# Patient Record
Sex: MALE | Race: WHITE | NOT HISPANIC OR LATINO | Employment: OTHER | URBAN - METROPOLITAN AREA
[De-identification: names, ages, dates, MRNs, and addresses within clinical notes are randomized per-mention and may not be internally consistent; named-entity substitution may affect disease eponyms.]

---

## 2017-01-23 ENCOUNTER — ALLSCRIPTS OFFICE VISIT (OUTPATIENT)
Dept: OTHER | Facility: OTHER | Age: 67
End: 2017-01-23

## 2017-02-07 ENCOUNTER — ALLSCRIPTS OFFICE VISIT (OUTPATIENT)
Dept: OTHER | Facility: OTHER | Age: 67
End: 2017-02-07

## 2017-02-08 ENCOUNTER — GENERIC CONVERSION - ENCOUNTER (OUTPATIENT)
Dept: OTHER | Facility: OTHER | Age: 67
End: 2017-02-08

## 2017-02-09 ENCOUNTER — GENERIC CONVERSION - ENCOUNTER (OUTPATIENT)
Dept: OTHER | Facility: OTHER | Age: 67
End: 2017-02-09

## 2017-02-09 LAB
A/G RATIO (HISTORICAL): 2.2 (ref 1.1–2.5)
ALBUMIN SERPL BCP-MCNC: 4.4 G/DL (ref 3.6–4.8)
ALP SERPL-CCNC: 72 IU/L (ref 39–117)
ALT SERPL W P-5'-P-CCNC: 13 IU/L (ref 0–44)
AST SERPL W P-5'-P-CCNC: 14 IU/L (ref 0–40)
BILIRUB SERPL-MCNC: 1.6 MG/DL (ref 0–1.2)
BUN SERPL-MCNC: 15 MG/DL (ref 8–27)
BUN/CREA RATIO (HISTORICAL): 18 (ref 10–22)
CALCIUM SERPL-MCNC: 9.1 MG/DL (ref 8.6–10.2)
CHLORIDE SERPL-SCNC: 97 MMOL/L (ref 96–106)
CHOLEST SERPL-MCNC: 140 MG/DL (ref 100–199)
CO2 SERPL-SCNC: 25 MMOL/L (ref 18–29)
CREAT SERPL-MCNC: 0.83 MG/DL (ref 0.76–1.27)
EGFR AFRICAN AMERICAN (HISTORICAL): 107 ML/MIN/1.73
EGFR-AMERICAN CALC (HISTORICAL): 92 ML/MIN/1.73
GLUCOSE SERPL-MCNC: 100 MG/DL (ref 65–99)
HDLC SERPL-MCNC: 25 MG/DL
LDLC SERPL CALC-MCNC: 97 MG/DL (ref 0–99)
POTASSIUM SERPL-SCNC: 4 MMOL/L (ref 3.5–5.2)
PROSTATE SPECIFIC ANTIGEN (HISTORICAL): 4.2 NG/ML (ref 0–4)
SODIUM SERPL-SCNC: 137 MMOL/L (ref 134–144)
TOT. GLOBULIN, SERUM (HISTORICAL): 2 G/DL (ref 1.5–4.5)
TOTAL PROTEIN (HISTORICAL): 6.4 G/DL (ref 6–8.5)
TRIGL SERPL-MCNC: 91 MG/DL (ref 0–149)

## 2017-02-10 LAB
CULTURE RESULT (HISTORICAL): NORMAL
INTERPRETATION (HISTORICAL): NORMAL
MISCELLANEOUS LAB TEST RESULT (HISTORICAL): NORMAL

## 2017-02-13 ENCOUNTER — GENERIC CONVERSION - ENCOUNTER (OUTPATIENT)
Dept: OTHER | Facility: OTHER | Age: 67
End: 2017-02-13

## 2017-02-22 ENCOUNTER — ALLSCRIPTS OFFICE VISIT (OUTPATIENT)
Dept: OTHER | Facility: OTHER | Age: 67
End: 2017-02-22

## 2017-02-23 DIAGNOSIS — Z13.6 ENCOUNTER FOR SCREENING FOR CARDIOVASCULAR DISORDERS: ICD-10-CM

## 2017-02-23 DIAGNOSIS — Z13.89 ENCOUNTER FOR SCREENING FOR OTHER DISORDER: ICD-10-CM

## 2017-02-23 DIAGNOSIS — Z13.83 ENCOUNTER FOR SCREENING FOR RESPIRATORY DISORDER: ICD-10-CM

## 2017-03-07 ENCOUNTER — TRANSCRIBE ORDERS (OUTPATIENT)
Dept: ADMINISTRATIVE | Facility: HOSPITAL | Age: 67
End: 2017-03-07

## 2017-03-07 ENCOUNTER — ALLSCRIPTS OFFICE VISIT (OUTPATIENT)
Dept: OTHER | Facility: OTHER | Age: 67
End: 2017-03-07

## 2017-03-07 DIAGNOSIS — N23 RENAL COLIC: Primary | ICD-10-CM

## 2017-03-08 ENCOUNTER — HOSPITAL ENCOUNTER (OUTPATIENT)
Dept: CT IMAGING | Facility: HOSPITAL | Age: 67
Discharge: HOME | End: 2017-03-08
Payer: MEDICARE

## 2017-03-08 DIAGNOSIS — N23 RENAL COLIC: ICD-10-CM

## 2017-03-08 PROCEDURE — 74176 CT ABD & PELVIS W/O CONTRAST: CPT

## 2017-03-13 ENCOUNTER — GENERIC CONVERSION - ENCOUNTER (OUTPATIENT)
Dept: OTHER | Facility: OTHER | Age: 67
End: 2017-03-13

## 2017-04-28 ENCOUNTER — LAB REQUISITION (OUTPATIENT)
Dept: LAB | Facility: HOSPITAL | Age: 67
End: 2017-04-28
Payer: MEDICARE

## 2017-04-28 ENCOUNTER — ALLSCRIPTS OFFICE VISIT (OUTPATIENT)
Dept: OTHER | Facility: OTHER | Age: 67
End: 2017-04-28

## 2017-04-28 DIAGNOSIS — N40.0 ENLARGED PROSTATE WITHOUT LOWER URINARY TRACT SYMPTOMS (LUTS): ICD-10-CM

## 2017-04-28 DIAGNOSIS — N23 RENAL COLIC: ICD-10-CM

## 2017-04-28 LAB
CLARITY UR: NORMAL
COLOR UR: YELLOW
GLUCOSE (HISTORICAL): NORMAL
HGB UR QL STRIP.AUTO: NORMAL
KETONES UR STRIP-MCNC: NORMAL MG/DL
LEUKOCYTE ESTERASE UR QL STRIP: NORMAL
NITRITE UR QL STRIP: NORMAL
PH UR STRIP.AUTO: 7 [PH]
PROT UR STRIP-MCNC: NORMAL MG/DL
SP GR UR STRIP.AUTO: 1.01

## 2017-04-28 PROCEDURE — 87086 URINE CULTURE/COLONY COUNT: CPT | Performed by: UROLOGY

## 2017-04-29 LAB — BACTERIA UR CULT: NORMAL

## 2017-05-25 ENCOUNTER — GENERIC CONVERSION - ENCOUNTER (OUTPATIENT)
Dept: OTHER | Facility: OTHER | Age: 67
End: 2017-05-25

## 2017-05-26 LAB — PROSTATE SPECIFIC ANTIGEN (HISTORICAL): 4.7 NG/ML (ref 0–4)

## 2017-06-01 DIAGNOSIS — R97.20 ELEVATED PROSTATE SPECIFIC ANTIGEN (PSA): ICD-10-CM

## 2017-06-06 ENCOUNTER — GENERIC CONVERSION - ENCOUNTER (OUTPATIENT)
Dept: OTHER | Facility: OTHER | Age: 67
End: 2017-06-06

## 2017-06-07 LAB — PROSTATE SPECIFIC ANTIGEN (HISTORICAL): 3.1 NG/ML (ref 0–4)

## 2017-06-08 ENCOUNTER — ALLSCRIPTS OFFICE VISIT (OUTPATIENT)
Dept: OTHER | Facility: OTHER | Age: 67
End: 2017-06-08

## 2017-06-08 LAB
BILIRUB UR QL STRIP: NORMAL
CLARITY UR: NORMAL
COLOR UR: YELLOW
GLUCOSE (HISTORICAL): NORMAL
HGB UR QL STRIP.AUTO: NORMAL
KETONES UR STRIP-MCNC: NORMAL MG/DL
LEUKOCYTE ESTERASE UR QL STRIP: NORMAL
NITRITE UR QL STRIP: NORMAL
PH UR STRIP.AUTO: 5 [PH]
PROT UR STRIP-MCNC: NORMAL MG/DL
SP GR UR STRIP.AUTO: 1.01

## 2017-06-19 ENCOUNTER — LAB REQUISITION (OUTPATIENT)
Dept: LAB | Facility: HOSPITAL | Age: 67
End: 2017-06-19
Payer: MEDICARE

## 2017-06-19 ENCOUNTER — ALLSCRIPTS OFFICE VISIT (OUTPATIENT)
Dept: OTHER | Facility: OTHER | Age: 67
End: 2017-06-19

## 2017-06-19 DIAGNOSIS — D49.2 NEOPLASM OF UNSPECIFIED BEHAVIOR OF BONE, SOFT TISSUE, AND SKIN: ICD-10-CM

## 2017-06-19 PROCEDURE — 88305 TISSUE EXAM BY PATHOLOGIST: CPT | Performed by: FAMILY MEDICINE

## 2017-06-19 PROCEDURE — 88342 IMHCHEM/IMCYTCHM 1ST ANTB: CPT | Performed by: FAMILY MEDICINE

## 2017-06-30 ENCOUNTER — GENERIC CONVERSION - ENCOUNTER (OUTPATIENT)
Dept: OTHER | Facility: OTHER | Age: 67
End: 2017-06-30

## 2018-01-12 VITALS
BODY MASS INDEX: 25.61 KG/M2 | HEIGHT: 75 IN | TEMPERATURE: 97.1 F | HEART RATE: 64 BPM | SYSTOLIC BLOOD PRESSURE: 126 MMHG | RESPIRATION RATE: 18 BRPM | WEIGHT: 206 LBS | DIASTOLIC BLOOD PRESSURE: 82 MMHG

## 2018-01-12 VITALS
DIASTOLIC BLOOD PRESSURE: 80 MMHG | HEIGHT: 75 IN | WEIGHT: 204 LBS | SYSTOLIC BLOOD PRESSURE: 122 MMHG | BODY MASS INDEX: 25.36 KG/M2 | HEART RATE: 80 BPM

## 2018-01-13 VITALS
RESPIRATION RATE: 18 BRPM | HEART RATE: 64 BPM | TEMPERATURE: 98.2 F | BODY MASS INDEX: 25.49 KG/M2 | HEIGHT: 75 IN | WEIGHT: 205 LBS | DIASTOLIC BLOOD PRESSURE: 70 MMHG | SYSTOLIC BLOOD PRESSURE: 122 MMHG

## 2018-01-13 VITALS
TEMPERATURE: 98.6 F | RESPIRATION RATE: 20 BRPM | BODY MASS INDEX: 25.61 KG/M2 | SYSTOLIC BLOOD PRESSURE: 120 MMHG | WEIGHT: 206 LBS | HEIGHT: 75 IN | DIASTOLIC BLOOD PRESSURE: 76 MMHG | HEART RATE: 68 BPM

## 2018-01-13 VITALS
WEIGHT: 206 LBS | HEART RATE: 60 BPM | RESPIRATION RATE: 16 BRPM | SYSTOLIC BLOOD PRESSURE: 120 MMHG | HEIGHT: 75 IN | DIASTOLIC BLOOD PRESSURE: 80 MMHG | TEMPERATURE: 98.2 F | BODY MASS INDEX: 25.61 KG/M2

## 2018-01-13 VITALS
HEART RATE: 74 BPM | HEIGHT: 75 IN | WEIGHT: 209 LBS | SYSTOLIC BLOOD PRESSURE: 124 MMHG | DIASTOLIC BLOOD PRESSURE: 74 MMHG | BODY MASS INDEX: 25.99 KG/M2

## 2018-01-13 VITALS
WEIGHT: 207 LBS | DIASTOLIC BLOOD PRESSURE: 72 MMHG | HEIGHT: 75 IN | TEMPERATURE: 98.1 F | HEART RATE: 82 BPM | SYSTOLIC BLOOD PRESSURE: 134 MMHG | RESPIRATION RATE: 20 BRPM | BODY MASS INDEX: 25.74 KG/M2

## 2018-01-14 NOTE — RESULT NOTES
Verified Results  (1) COMPREHENSIVE METABOLIC PANEL [id number] 98:55LY Brian Button     Test Name Result Flag Reference   Glucose, Serum 100 mg/dL H 65-99   BUN 15 mg/dL  8-27   Creatinine, Serum 0 83 mg/dL  0 76-1 27   eGFR If NonAfricn Am 92 mL/min/1 73  >59   eGFR If Africn Am 107 mL/min/1 73  >59   BUN/Creatinine Ratio 18  10-22   Sodium, Serum 137 mmol/L  134-144   Potassium, Serum 4 0 mmol/L  3 5-5 2   Chloride, Serum 97 mmol/L     Carbon Dioxide, Total 25 mmol/L  18-29   Calcium, Serum 9 1 mg/dL  8 6-10 2   Protein, Total, Serum 6 4 g/dL  6 0-8 5   Albumin, Serum 4 4 g/dL  3 6-4 8   Globulin, Total 2 0 g/dL  1 5-4 5   A/G Ratio 2 2  1 1-2 5   Bilirubin, Total 1 6 mg/dL H 0 0-1 2   Alkaline Phosphatase, S 72 IU/L     AST (SGOT) 14 IU/L  0-40   ALT (SGPT) 13 IU/L  0-44     (1) LIPID PANEL FASTING W DIRECT LDL REFLEX [id number] 09:30AM Brian Button     Test Name Result Flag Reference   Cholesterol, Total 140 mg/dL  100-199   Triglycerides 91 mg/dL  0-149   HDL Cholesterol 25 mg/dL L >39   LDL Cholesterol Calc 97 mg/dL  0-99     (1) PSA (SCREEN) (Dx V76 44 Screen for Prostate Cancer) [id number] 09:30AM Brian Button     Test Name Result Flag Reference   Prostate Specific Ag, Serum 4 2 ng/mL H 0 0-4 0   Roche ECLIA methodology  According to the American Urological Association, Serum PSA should  decrease and remain at undetectable levels after radical  prostatectomy  The AUA defines biochemical recurrence as an initial  PSA value 0 2 ng/mL or greater followed by a subsequent confirmatory  PSA value 0 2 ng/mL or greater  Values obtained with different assay methods or kits cannot be used  interchangeably  Results cannot be interpreted as absolute evidence  of the presence or absence of malignant disease  Discussion/Summary   Please schedule an office appointment to discuss your test results  Your Prostate PSA level is elevated and further evaluation is needed    Dr Argenis Rodarte

## 2018-01-15 NOTE — RESULT NOTES
Verified Results  (1) URINE CULTURE [id number] 09:30AM Arelaina Villay     Test Name Result Flag Reference   Urine Culture,Comprehensive Final report     Result 1 Comment     No growth in 36 - 48 hours  Discussion/Summary   Urine culture did not show any bacterial infection    Dr Fredda Phoenix

## 2018-01-16 NOTE — RESULT NOTES
Verified Results  (1) TISSUE EXAM [id number] 12:50PM Phyllis Olmos Order Number: [id number]     Test Name Result Flag Reference   LAB AP CASE REPORT (Report)     Surgical Pathology Report             Case: [id number]                   Authorizing Provider: Moustapha Centeno DO    Collected:      06/19/2017 1250        Pathologist:      Oniel Diego MD        Received:      06/20/2017 7763        Specimen:  Skin, Other, Left trapezius   LAB AP FINAL DIAGNOSIS (Report)     A  Skin, Left trapezius, punch biopsy:  - Severely atypical intraepidermal melanocytic proliferation; recommend a   reexcision; see note  Note: Additional deeper sections and Melan A immunostain are examined,   and show an atypical melanocytic proliferation comprised of single cells  Lesional cells are at all tissue edges  A reexicision with a zone of   normal tissue is recommended for a definitive diagnosis; and to preclude   recurrence  Clinical correlation is recommended  Interpretation performed at [facility], [address] [place] [address], [address]        Electronically signed by Oniel Diego MD on 6/30/2017 at 5:08 PM   LAB AP SURGICAL ADDITIONAL INFORMATION (Report)     These tests were developed and their performance characteristics   determined by [facility] or [facility]  They may not be cleared or approved by the [place]  Food and   Drug Administration  The FDA has determined that such clearance or   approval is not necessary  These tests are used for clinical purposes  They should not be regarded as investigational or for research  This   laboratory has been approved by [facility], designated as a high-complexity   laboratory and is qualified to perform these tests  LAB AP GROSS DESCRIPTION (Report)     A  The specimen is received in formalin, labeled with the patient's name   and hospital number, and is designated left trapezius punch biopsy   The   specimen consists of a black gray portion of skin measuring 0 2 x 0 2 by   0 1 cm  The apparent margin of resection is painted with green ink  Entirely submitted  One cassette    Note: The estimated total formalin fixation time based upon information   provided by the submitting clinician and the standard processing schedule   is 31 75 hours        XIOMARAK   LAB AP CLINICAL INFORMATION       Order Number: [id number]

## 2018-01-18 NOTE — RESULT NOTES
Verified Results  CT RENAL STONE STUDY ABDOMEN PELVIS WO CONTRAST [id number] 07:48AM Precilla Sear     Test Name Result Flag Reference   CT RENAL STONE STUDY ABDOMEN PELVIS WO CONTRAST (Report)     CT ABDOMEN AND PELVIS WITHOUT IV CONTRAST - LOW DOSE RENAL STONE      INDICATION: N23: Unspecified renal colic  History taken directly from the electronic ordering system  COMPARISON: Abdomen and pelvic CT from 8/24/2012  TECHNIQUE: Low dose thin section CT examination of the abdomen and pelvis was performed without intravenous or oral contrast according to a protocol specifically designed to evaluate for urinary tract calculus  Axial, sagittal and coronal reformatted    images were submitted for interpretation  This examination, like all CT scans performed in the [facility], was performed utilizing techniques to minimize radiation dose exposure, including the use of iterative reconstruction and    automated exposure control  Evaluation for pathology in the abdomen and pelvis that is unrelated to urinary tract calculi is limited  Rad dose 324 mGy -cm     FINDINGS:     RIGHT KIDNEY AND URETER:   No urinary tract calculi  No hydronephrosis or hydroureter  No perinephric collection  LEFT KIDNEY AND URETER:   No urinary tract calculi  No hydronephrosis or hydroureter  No perinephric collection  URINARY BLADDER:   There are multiple dependent urinary bladder stones, versus bladder wall calcifications, increased compared to prior studies  No significant abnormality in the visualized lung bases  Limited low radiation dose noncontrast CT evaluation demonstrates no clinically significant abnormality of liver, spleen, pancreas, or adrenal glands  Again seen are multiple tiny simple cysts in the liver  No calcified gallstones or gallbladder wall thickening noted  No bowel obstruction  No ascites or lymphadenopathy  There is colonic diverticulosis without diverticulitis  Limited evaluation demonstrates no evidence to suggest acute appendicitis  No acute fracture or destructive osseous lesion is identified  IMPRESSION:     There are no renal or ureteral stones or hydronephrosis  There are multiple dependent urinary bladder stones, versus bladder wall calcifications, increased compared to prior studies  Because bladder wall calcifications have a broad differential include chronic infection and bladder cancer, recommend urology    consultation         ##sigslh##sigslh       Workstation performed: [id number]     Signed by:   Cathyann Cowden, MD   3/9/17

## 2018-02-10 DIAGNOSIS — N40.0 ENLARGED PROSTATE WITHOUT LOWER URINARY TRACT SYMPTOMS (LUTS): Primary | ICD-10-CM

## 2018-02-10 PROBLEM — E78.6 LOW HDL (UNDER 40): Status: ACTIVE | Noted: 2017-02-09

## 2018-02-10 PROBLEM — N23 RENAL COLIC: Status: ACTIVE | Noted: 2017-03-07

## 2018-02-10 PROBLEM — N32.89 CALCIFICATION OF BLADDER: Status: ACTIVE | Noted: 2017-04-28

## 2018-02-10 RX ORDER — TAMSULOSIN HYDROCHLORIDE 0.4 MG/1
CAPSULE ORAL
Qty: 60 CAPSULE | Refills: 0 | Status: SHIPPED | OUTPATIENT
Start: 2018-02-10 | End: 2018-04-06

## 2018-02-10 RX ORDER — FINASTERIDE 5 MG/1
1 TABLET, FILM COATED ORAL DAILY
COMMUNITY
Start: 2017-06-08 | End: 2018-05-30

## 2018-02-10 RX ORDER — TAMSULOSIN HYDROCHLORIDE 0.4 MG/1
2 CAPSULE ORAL DAILY
COMMUNITY
Start: 2012-05-03 | End: 2018-04-06 | Stop reason: SDUPTHER

## 2018-02-10 RX ORDER — FLUTICASONE PROPIONATE 50 MCG
2 SPRAY, SUSPENSION (ML) NASAL
COMMUNITY
Start: 2017-03-07 | End: 2019-12-03 | Stop reason: SDUPTHER

## 2018-04-06 ENCOUNTER — HOSPITAL ENCOUNTER (OUTPATIENT)
Dept: RADIOLOGY | Facility: HOSPITAL | Age: 68
Discharge: HOME | End: 2018-04-06
Attending: FAMILY MEDICINE
Payer: MEDICARE

## 2018-04-06 ENCOUNTER — OFFICE VISIT (OUTPATIENT)
Dept: FAMILY MEDICINE CLINIC | Facility: CLINIC | Age: 68
End: 2018-04-06
Payer: MEDICARE

## 2018-04-06 ENCOUNTER — TRANSCRIBE ORDERS (OUTPATIENT)
Dept: ADMINISTRATIVE | Facility: HOSPITAL | Age: 68
End: 2018-04-06

## 2018-04-06 VITALS
HEIGHT: 75 IN | DIASTOLIC BLOOD PRESSURE: 76 MMHG | HEART RATE: 72 BPM | BODY MASS INDEX: 24.37 KG/M2 | RESPIRATION RATE: 16 BRPM | SYSTOLIC BLOOD PRESSURE: 114 MMHG | TEMPERATURE: 97.6 F | WEIGHT: 196 LBS

## 2018-04-06 DIAGNOSIS — R10.31 INGUINAL PAIN, RIGHT: ICD-10-CM

## 2018-04-06 DIAGNOSIS — J30.9 CHRONIC ALLERGIC RHINITIS, UNSPECIFIED SEASONALITY, UNSPECIFIED TRIGGER: Primary | ICD-10-CM

## 2018-04-06 DIAGNOSIS — N40.0 ENLARGED PROSTATE WITHOUT LOWER URINARY TRACT SYMPTOMS (LUTS): ICD-10-CM

## 2018-04-06 DIAGNOSIS — M54.50 ACUTE BILATERAL LOW BACK PAIN WITHOUT SCIATICA: ICD-10-CM

## 2018-04-06 PROCEDURE — 99214 OFFICE O/P EST MOD 30 MIN: CPT | Performed by: FAMILY MEDICINE

## 2018-04-06 PROCEDURE — 73522 X-RAY EXAM HIPS BI 3-4 VIEWS: CPT

## 2018-04-06 PROCEDURE — 72114 X-RAY EXAM L-S SPINE BENDING: CPT

## 2018-04-06 RX ORDER — CYCLOBENZAPRINE HCL 10 MG
10 TABLET ORAL 3 TIMES DAILY PRN
Qty: 30 TABLET | Refills: 0 | Status: SHIPPED | OUTPATIENT
Start: 2018-04-06 | End: 2018-05-14

## 2018-04-06 RX ORDER — NABUMETONE 500 MG/1
500 TABLET, FILM COATED ORAL 2 TIMES DAILY
Qty: 40 TABLET | Refills: 0 | Status: SHIPPED | OUTPATIENT
Start: 2018-04-06 | End: 2018-05-30

## 2018-04-06 RX ORDER — TAMSULOSIN HYDROCHLORIDE 0.4 MG/1
0.4 CAPSULE ORAL DAILY
Qty: 90 CAPSULE | Refills: 1 | Status: SHIPPED | OUTPATIENT
Start: 2018-04-06 | End: 2018-06-05 | Stop reason: SDUPTHER

## 2018-04-06 NOTE — PROGRESS NOTES
Assessment/Plan:    No problem-specific Assessment & Plan notes found for this encounter  Allergies/bph stable  Hernia region pain new  Back pain after fall, nsaids and flexeril, PT   Then ortho if no better     Diagnoses and all orders for this visit:    Chronic allergic rhinitis, unspecified seasonality, unspecified trigger    Acute bilateral low back pain without sciatica    Inguinal pain, right        PT  Ortho if no better  surg eval for hernia pain    No Follow-up on file  Subjective:      Patient ID: Damir Tidwell is a 79 y o  male  Chief Complaint   Patient presents with    Back Injury     39 days ago, hurt at work, pt owns his own business     Hernia     4 yeah ago had surgery, feeling discomfort now in the same spot        HPI    Fell off ladder  At least 4 feet  Flat on his back  Did not get sob  Hx of HNP lumbar per pt years  Low back very sore R>L  39 days ago  Paul with movt  Constant and severe  A lot of advil  Has not rested  Urine/stool ok  Hx of right hernia surgery 4y ago, on/off, sharp px, random, gurgling in area at times, pain with cough , no bowel or urine changes, pain is more recent, no redness in area    Advised there is a possibility that, even though he dropped his workers [employer], his injury may fall under the responsibility of the person who hired him to perform work at time of the injury      The following portions of the patient's history were reviewed and updated as appropriate: allergies, current medications, past family history, past medical history, past social history, past surgical history and problem list     Review of Systems   Constitutional: Negative for chills and fever  Musculoskeletal: Negative for back pain           Current Outpatient Prescriptions   Medication Sig Dispense Refill    fluticasone (FLONASE) 50 mcg/act nasal spray 2 puffs into each nostril daily      tamsulosin (FLOMAX) 0 4 mg Take 2 capsules by mouth daily      finasteride (PROSCAR) 5 mg tablet Take 1 tablet by mouth daily       No current facility-administered medications for this visit  Objective:    /76   Pulse 72   Temp 97 6 °F (36 4 °C)   Resp 16   Ht 6' 2 5" (1 892 m)   Wt 88 9 kg (196 lb)   BMI 24 83 kg/m²        Physical Exam   Constitutional: He appears well-developed  HENT:   Head: Normocephalic  Eyes: Conjunctivae are normal    Neck: Neck supple  Cardiovascular: Normal rate and intact distal pulses  Pulmonary/Chest: Effort normal  No respiratory distress  Abdominal: Soft  Musculoskeletal: He exhibits tenderness  He exhibits no edema or deformity  Neurological: He is alert  Skin: Skin is warm and dry  Psychiatric: His behavior is normal  Thought content normal    Nursing note and vitals reviewed      paralumbar pain on right   Right inguinal tenderness w/o mass or bulge   SLR neg    Moustapha Centeno DO

## 2018-04-17 ENCOUNTER — OFFICE VISIT (OUTPATIENT)
Dept: SURGERY | Facility: CLINIC | Age: 68
End: 2018-04-17
Payer: MEDICARE

## 2018-04-17 VITALS
WEIGHT: 192.04 LBS | BODY MASS INDEX: 24.65 KG/M2 | SYSTOLIC BLOOD PRESSURE: 118 MMHG | HEIGHT: 74 IN | HEART RATE: 74 BPM | DIASTOLIC BLOOD PRESSURE: 78 MMHG | TEMPERATURE: 98.2 F

## 2018-04-17 DIAGNOSIS — K40.90 NON-RECURRENT UNILATERAL INGUINAL HERNIA WITHOUT OBSTRUCTION OR GANGRENE: Primary | ICD-10-CM

## 2018-04-17 DIAGNOSIS — R10.31 RIGHT GROIN PAIN: ICD-10-CM

## 2018-04-17 PROCEDURE — 99203 OFFICE O/P NEW LOW 30 MIN: CPT | Performed by: SURGERY

## 2018-04-17 NOTE — PROGRESS NOTES
Consult- General Surgery   Na Shore 79 y o  male MRN: [id number]  Unit/Bed#:  Encounter: [id number]    Assessment/Plan     Assessment:  Right groin pain for the past couple months, increasing in intensity  The patient is status post open right inguinal hernia repair 4 years ago  Left inguinal hernia  Plan:  I advised the patient to have an ultrasound of the right groin to rule out recurrence of the right inguinal hernia  He will return to my office and 7-10 days to discuss the results  History of Present Illness     HPI:  I had the pleasure of seeing Na Shore in the office today for evaluation of right groin pain  The patient is a pleasant 79 y o  male who presents with several months history of right groin pain, usually happens while having a bowel movement  He had the hernia repair on the right side 4 years ago, the pain is described as sharp, nonradiating, no bulge noted  He also noted a bulge from the left groin for several months, increasing in size and causing discomfort with lifting  The patient denies having any nausea, vomiting, diarrhea, constipation or urinary symptoms  Review of Systems   Constitutional: Negative for chills and fever  HENT: Negative for nosebleeds and sore throat  Eyes: Negative for pain and discharge  Respiratory: Negative for cough and shortness of breath  Cardiovascular: Negative for chest pain and palpitations  Gastrointestinal:        As per history of present illness  Endocrine: Negative for cold intolerance and heat intolerance  Genitourinary: Negative for dysuria and hematuria  Neurological: Negative for seizures and headaches  Hematological: Negative for adenopathy  Does not bruise/bleed easily  Psychiatric/Behavioral: Negative for confusion  The patient is not nervous/anxious  Historical Information   No past medical history on file  No past surgical history on file    Social History   History   Alcohol use Not on file History   Drug use: Unknown     History   Smoking Status    Former Smoker   Smokeless Tobacco    Never Used     Family History: No family history on file  Meds/Allergies   all medications and allergies reviewed   Prescriptions and Patient-Reported    cyclobenzaprine (FLEXERIL) 10 mg tablet 10 mg, Oral, 3 times daily PRN             finasteride (PROSCAR) 5 mg tablet 1 tablet, Oral, Daily             fluticasone (FLONASE) 50 mcg/act nasal spray 2 puff, Nasal, Daily             nabumetone (RELAFEN) 500 mg tablet 500 mg, Oral, 2 times daily             tamsulosin (FLOMAX) 0 4 mg 0 4 mg, Oral, Daily                Allergies   Allergen Reactions    Aspirin GI Intolerance     Reaction Date: [id number];        Objective     Current Vitals:   Blood Pressure: 118/78 (04/17/18 0910)  Pulse: 74 (04/17/18 0910)  Temperature: 98 2 °F (36 8 °C) (04/17/18 0910)  Temp Source: Oral (04/17/18 0910)  Height: 6' 2" (188 cm) (04/17/18 0910)  Weight - Scale: 87 1 kg (192 lb 0 6 oz) (04/17/18 0910)      Physical Exam   Constitutional: He is oriented to person, place, and time  He appears well-developed and well-nourished  No distress  HENT:   Head: Normocephalic  Mouth/Throat: No oropharyngeal exudate  Eyes: Pupils are equal, round, and reactive to light  No scleral icterus  Neck: Normal range of motion  Neck supple  No thyromegaly present  Cardiovascular: Normal rate and regular rhythm  No murmur heard  Pulmonary/Chest: Effort normal and breath sounds normal  No respiratory distress  Abdominal: Soft  Bowel sounds are normal  He exhibits no distension and no mass  Genitourinary:   Genitourinary Comments: Examination of the right groin with Valsalva maneuver failed to revealed a palpable recurrent right inguinal hernia  There is an obvious left inguinal hernia, not reducible, without tenderness to palpation  Musculoskeletal: He exhibits no edema or tenderness     Lymphadenopathy:     He has no cervical adenopathy  Neurological: He is alert and oriented to person, place, and time  No cranial nerve deficit  Skin: No rash noted  No erythema  Psychiatric: He has a normal mood and affect   His behavior is normal

## 2018-04-30 ENCOUNTER — HOSPITAL ENCOUNTER (OUTPATIENT)
Dept: ULTRASOUND IMAGING | Facility: HOSPITAL | Age: 68
Discharge: HOME | End: 2018-04-30
Attending: SURGERY
Payer: MEDICARE

## 2018-04-30 DIAGNOSIS — R10.31 RIGHT GROIN PAIN: ICD-10-CM

## 2018-04-30 PROCEDURE — 76705 ECHO EXAM OF ABDOMEN: CPT

## 2018-05-01 ENCOUNTER — OFFICE VISIT (OUTPATIENT)
Dept: SURGERY | Facility: CLINIC | Age: 68
End: 2018-05-01
Payer: MEDICARE

## 2018-05-01 VITALS
WEIGHT: 195 LBS | HEIGHT: 74 IN | DIASTOLIC BLOOD PRESSURE: 78 MMHG | SYSTOLIC BLOOD PRESSURE: 118 MMHG | BODY MASS INDEX: 25.03 KG/M2 | TEMPERATURE: 98.2 F | HEART RATE: 76 BPM | RESPIRATION RATE: 18 BRPM

## 2018-05-01 DIAGNOSIS — K40.91 UNILATERAL RECURRENT INGUINAL HERNIA WITHOUT OBSTRUCTION OR GANGRENE: Primary | ICD-10-CM

## 2018-05-01 PROCEDURE — 99213 OFFICE O/P EST LOW 20 MIN: CPT | Performed by: SURGERY

## 2018-05-01 NOTE — PROGRESS NOTES
Follow-up- General Surgery   Sugar Souza 79 y o  male MRN: [id number]  Unit/Bed#:  Encounter: [id number]    Assessment/Plan     Assessment:  Recurrent right inguinal hernia, symptomatic  Plan:  I advised the patient to undergo laparoscopic repair of right inguinal hernia with mesh  I discussed the operative procedure, risks, benefits and alternatives with the patient, he understood and agreed to proceed  The patient stated he would like to schedule at the later time because a were complex  The patient will contact our office at that time  History of Present Illness     HPI:  I had the pleasure of seeing Sugar Souza in the office today for follow-up  The patient is a pleasant 79 y o  male who was seen in my office a couple of weeks ago because of pain on the right groin  The patient is status post open repair of right inguinal hernia  I was not able to digitally palpate hernia on the right groin  Ultrasound of the right groin on the preliminary report stating that he does have right inguinal hernia  Official report still pending  The patient still complains of discomfort and pinching on the right groin depending on the physical activity  Review of Systems: The rest of the review of system total of 10 were negative except for the HPI  Historical Information   No past medical history on file  No past surgical history on file    Social History   History   Alcohol use Not on file     History   Drug use: Unknown     History   Smoking Status    Former Smoker   Smokeless Tobacco    Never Used     Family History: non-contributory    Meds/Allergies   all medications and allergies reviewed  Allergies   Allergen Reactions    Aspirin GI Intolerance     Reaction Date: [id number];        Objective     Current Vitals:   Blood Pressure: 118/78 (05/01/18 0810)  Pulse: 76 (05/01/18 0810)  Temperature: 98 2 °F (36 8 °C) (05/01/18 0810)  Temp Source: Oral (05/01/18 0810)  Respirations: 18 (05/01/18 7434)  Height: 6' 2" (188 cm) (05/01/18 0810)  Weight - Scale: 88 5 kg (195 lb) (05/01/18 0810)      Physical Exam   Constitutional: He is oriented to person, place, and time  He appears well-developed and well-nourished  No distress  HENT:   Head: Normocephalic  Mouth/Throat: No oropharyngeal exudate  Eyes: Pupils are equal, round, and reactive to light  No scleral icterus  Neck: Normal range of motion  Neck supple  Cardiovascular: Normal rate and regular rhythm  No murmur heard  Pulmonary/Chest: Effort normal and breath sounds normal  No respiratory distress  Abdominal: Soft  He exhibits no mass  There is no tenderness  Genitourinary:   Genitourinary Comments: I still can't palpate a right inguinal hernia during digital examination of the inguinal canal and Valsalva maneuver  Musculoskeletal: He exhibits no edema or tenderness  Lymphadenopathy:     He has no cervical adenopathy  Neurological: He is alert and oriented to person, place, and time  No cranial nerve deficit  Skin: No rash noted  No erythema  Psychiatric: He has a normal mood and affect   His behavior is normal

## 2018-05-14 VITALS
DIASTOLIC BLOOD PRESSURE: 88 MMHG | HEART RATE: 61 BPM | WEIGHT: 194 LBS | HEIGHT: 74 IN | SYSTOLIC BLOOD PRESSURE: 127 MMHG | BODY MASS INDEX: 24.9 KG/M2

## 2018-05-14 DIAGNOSIS — S32.010A CLOSED COMPRESSION FRACTURE OF FIRST LUMBAR VERTEBRA, INITIAL ENCOUNTER: Primary | ICD-10-CM

## 2018-05-14 DIAGNOSIS — S39.012A LUMBAR STRAIN, INITIAL ENCOUNTER: ICD-10-CM

## 2018-05-14 DIAGNOSIS — M54.50 LUMBAR BACK PAIN: ICD-10-CM

## 2018-05-14 DIAGNOSIS — M53.3 SACROILIAC JOINT DYSFUNCTION OF BOTH SIDES: ICD-10-CM

## 2018-05-14 DIAGNOSIS — M62.9 HAMSTRING TIGHTNESS OF BOTH LOWER EXTREMITIES: ICD-10-CM

## 2018-05-14 PROCEDURE — 99203 OFFICE O/P NEW LOW 30 MIN: CPT | Performed by: FAMILY MEDICINE

## 2018-05-14 RX ORDER — CYCLOBENZAPRINE HCL 10 MG
10 TABLET ORAL 2 TIMES DAILY PRN
Qty: 30 TABLET | Refills: 0 | Status: SHIPPED | OUTPATIENT
Start: 2018-05-14 | End: 2018-05-30

## 2018-05-14 RX ORDER — NAPROXEN 500 MG/1
500 TABLET ORAL 2 TIMES DAILY WITH MEALS
Qty: 30 TABLET | Refills: 0 | Status: SHIPPED | OUTPATIENT
Start: 2018-05-14 | End: 2018-05-30

## 2018-05-14 NOTE — PROGRESS NOTES
Assessment/Plan:  Assessment/Plan   Diagnoses and all orders for this visit:    Closed compression fracture of first lumbar vertebra, initial encounter [facility])  -     Ambulatory referral to Physical Therapy; Future    Lumbar strain, initial encounter  -     Ambulatory referral to Physical Therapy; Future  -     cyclobenzaprine (FLEXERIL) 10 mg tablet; Take 1 tablet (10 mg total) by mouth 2 (two) times a day as needed for muscle spasms    Lumbar back pain  -     naproxen (NAPROSYN) 500 mg tablet; Take 1 tablet (500 mg total) by mouth 2 (two) times a day with meals    Sacroiliac joint dysfunction of both sides  -     Ambulatory referral to Physical Therapy; Future  -     naproxen (NAPROSYN) 500 mg tablet; Take 1 tablet (500 mg total) by mouth 2 (two) times a day with meals    Hamstring tightness of both lower extremities  -     Ambulatory referral to Physical Therapy; Future      74-year-old male with low back pain  Discussed with patient physical exam, radiographs, impression, and plan  X-rays are noted for stable L1 vertebral compression fracture and his physical exam is noted for midline tenderness L1-L4 and bilateral lumbar paraspinal hypertonicity with limited range of motion in left rotation and left side bending  His remaining physical exam is unremarkable for any lower extremity neurological deficit  I discussed him regimen of anti-inflammatory, muscle relaxer, and physical therapy to which he agreed  He is to take naproxen 500 mg twice daily with food consistently for 2 weeks, cyclobenzaprine 10 mg at night and may titrate up to 2 times a day as needed, and start physical therapy as soon as possible and do home exercises as directed  He will follow up with me in 6 weeks at which point he will be re-evaluated  Subjective:   Patient ID: Terri Salazar is a 79 y o  male    Chief Complaint   Patient presents with    Lower Back - Pain       74-year-old male presents for evaluation of low back pain of more than 2 months duration  He states that on 02/27/2018 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 landing flat on his back  He had immediate pain the low back area that was described as sharp, cramping, localized to the lumbar area, nonradiating, and associated feeling stiff  After getting up he went home and self-treated with taking Advil  He states that he continue to work 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 and reported only slow improvement in pain  He saw his primary care provider 1 month after injury and was sent for x-ray of the lumbar spine, which showed compression fracture of L1 vertebrae  He denies any lower extremity numbness/tingling or weakness, or any bowel or bladder incontinence  He reports history of having lumbosacral disc disease from over 20 years ago which was diagnosed on MRI which was treated conservatively with rest and NSAIDs  Back Pain   This is a new problem  The current episode started more than 1 month ago  The problem occurs constantly  The problem has been gradually improving  Associated symptoms include myalgias  Pertinent negatives include no abdominal pain, chest pain, chills, fever, numbness, rash, sore throat or weakness  The symptoms are aggravated by twisting  He has tried rest and NSAIDs for the symptoms  The treatment provided mild relief  The following portions of the patient's history were reviewed and updated as appropriate: He  has no past medical history on file  He  has a past surgical history that includes Hernia repair  His family history includes Diabetes in his mother; Heart failure in his father  He  reports that he has quit smoking  He has never used smokeless tobacco  His alcohol and drug histories are not on file  He is allergic to aspirin       Review of Systems   Constitutional: Negative for chills and fever  HENT: Negative for sore throat      Eyes: Negative for visual disturbance  Respiratory: Negative for shortness of breath  Cardiovascular: Negative for chest pain  Gastrointestinal: Negative for abdominal pain  Genitourinary: Negative for difficulty urinating  Musculoskeletal: Positive for back pain and myalgias  Skin: Negative for rash and wound  Neurological: Negative for weakness and numbness  Hematological: Does not bruise/bleed easily  Psychiatric/Behavioral: Negative for self-injury  Objective:  Vitals:    05/14/18 0907   BP: 127/88   Pulse: 61   Weight: 88 kg (194 lb)   Height: 6' 2 02" (1 88 m)     Right Hip Exam     Range of Motion   The patient has normal right hip ROM  Muscle Strength   The patient has normal right hip strength  Tests   KRISSY: negative    Comments:    Negative FADDIR      Left Hip Exam     Range of Motion   The patient has normal left hip ROM  Muscle Strength   The patient has normal left hip strength  Tests   KRISSY: negative    Comments:    Negative FADDIR      Back Exam     Tenderness   The patient is experiencing tenderness in the lumbar and sacroiliac (Lumbar midline L1-L4, bilateral paraspinal hypertonicity)  Range of Motion   Extension: normal   Flexion: normal   Lateral Bend Right: normal   Lateral Bend Left: abnormal   Rotation Right: normal   Rotation Left: abnormal     Muscle Strength   The patient has normal back strength  Tests   Straight leg raise right: negative  Straight leg raise left: negative    Reflexes   Patellar: 2/4    Other   Sensation: normal  Gait: normal             Physical Exam   Constitutional: He is oriented to person, place, and time  He appears well-developed  No distress  HENT:   Head: Normocephalic and atraumatic  Eyes: Conjunctivae are normal    Neck: No tracheal deviation present  Cardiovascular: Normal rate  Pulmonary/Chest: Effort normal  No respiratory distress  Abdominal: He exhibits no distension     Neurological: He is alert and oriented to person, place, and time  Skin: Skin is warm and dry  Psychiatric: He has a normal mood and affect  His behavior is normal    Nursing note and vitals reviewed  I have personally reviewed pertinent films in PACS

## 2018-05-30 ENCOUNTER — OFFICE VISIT (OUTPATIENT)
Dept: OBGYN CLINIC | Facility: CLINIC | Age: 68
End: 2018-05-30
Payer: MEDICARE

## 2018-05-30 VITALS
HEART RATE: 59 BPM | WEIGHT: 193.4 LBS | DIASTOLIC BLOOD PRESSURE: 79 MMHG | BODY MASS INDEX: 24.82 KG/M2 | SYSTOLIC BLOOD PRESSURE: 120 MMHG | HEIGHT: 74 IN

## 2018-05-30 DIAGNOSIS — M62.830 LUMBAR PARASPINAL MUSCLE SPASM: Primary | ICD-10-CM

## 2018-05-30 PROCEDURE — 99213 OFFICE O/P EST LOW 20 MIN: CPT | Performed by: FAMILY MEDICINE

## 2018-05-30 NOTE — PROGRESS NOTES
Assessment/Plan:  Assessment/Plan   Diagnoses and all orders for this visit:    Lumbar paraspinal muscle spasm  -     Nerve Stimulator (TENS THERAPY PAIN RELIEF) KIKE; 1 Device by Does not apply route 2 (two) times a day as needed (pain, spasm)            80-year-old male with low back pain  Discussed with patient physical exam, impression, and plan  Physical exam is noted for left lumbar paraspinal hypertonicity and mild tenderness  There is noted mild limited range of motion with right rotation and right side bending, with sensation of tightness in the low back  Clinical impression is localized muscle spasm  I will provide him with prescription for TENS unit which he is to start using twice daily  He is also to take with him to physical therapy for further instruction on appropriate use  He will keep his follow-up appointment with me which is in 4 weeks  Subjective:   Patient ID: Jonatan Purcell is a 79 y o  male  Chief Complaint   Patient presents with    Spine - Follow-up       80-year-old male following up for low back pain  He was last seen 2 weeks ago which point he was assessed to have had L1 vertebral body compression fracture from imaging done on 02/27/2018  He was referred to physical therapy  He has yet to start physical therapy  He reports that over the past week he has noticed soft tissue swelling at the left lumbar paraspinal region associated mild pain  He notices pain is worse with twisting and flexion  Pain is described as achy, associated feeling tight, and improved with rest   He denies any development of numbness /tingling or bowel or bladder incontinence  He has not had any new trauma since last visit  Back Pain   This is a new problem  The current episode started more than 1 month ago  The problem occurs constantly  The problem has been gradually improving  Associated symptoms include myalgias  Pertinent negatives include no numbness or weakness   The symptoms are aggravated by twisting  He has tried rest for the symptoms  The treatment provided mild relief  Review of Systems   Musculoskeletal: Positive for back pain and myalgias  Neurological: Negative for weakness and numbness  Objective:  Vitals:    05/30/18 1518   BP: 120/79   Pulse: 59   Weight: 87 7 kg (193 lb 6 4 oz)   Height: 6' 2" (1 88 m)     Back Exam     Tenderness   Back tenderness location: Left thoracolumbar paraspinal hypertonicity  Range of Motion   Extension: normal   Flexion: normal   Lateral Bend Right: abnormal   Lateral Bend Left: normal   Rotation Right: abnormal   Rotation Left: normal     Muscle Strength   The patient has normal back strength  Tests   Straight leg raise right: negative  Straight leg raise left: negative    Reflexes   Patellar: 2/4    Other   Sensation: normal  Gait: normal             Physical Exam   Constitutional: He is oriented to person, place, and time  He appears well-developed  No distress  HENT:   Head: Normocephalic and atraumatic  Eyes: Conjunctivae are normal    Neck: No tracheal deviation present  Cardiovascular: Normal rate  Pulmonary/Chest: Effort normal  No respiratory distress  Abdominal: He exhibits no distension  Neurological: He is alert and oriented to person, place, and time  Skin: Skin is warm and dry  Psychiatric: He has a normal mood and affect  His behavior is normal    Nursing note and vitals reviewed

## 2018-06-04 ENCOUNTER — EVALUATION (OUTPATIENT)
Dept: PHYSICAL THERAPY | Facility: CLINIC | Age: 68
End: 2018-06-04
Payer: MEDICARE

## 2018-06-04 DIAGNOSIS — S32.010A CLOSED COMPRESSION FRACTURE OF FIRST LUMBAR VERTEBRA, INITIAL ENCOUNTER: Primary | ICD-10-CM

## 2018-06-04 DIAGNOSIS — M62.9 HAMSTRING TIGHTNESS OF BOTH LOWER EXTREMITIES: ICD-10-CM

## 2018-06-04 DIAGNOSIS — S39.012A LUMBAR STRAIN, INITIAL ENCOUNTER: ICD-10-CM

## 2018-06-04 DIAGNOSIS — M53.3 SACROILIAC JOINT DYSFUNCTION OF BOTH SIDES: ICD-10-CM

## 2018-06-04 PROCEDURE — 97161 PT EVAL LOW COMPLEX 20 MIN: CPT | Performed by: PHYSICAL THERAPIST

## 2018-06-04 PROCEDURE — G8991 OTHER PT/OT GOAL STATUS: HCPCS | Performed by: PHYSICAL THERAPIST

## 2018-06-04 PROCEDURE — G8990 OTHER PT/OT CURRENT STATUS: HCPCS | Performed by: PHYSICAL THERAPIST
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
## 2018-06-05 DIAGNOSIS — N40.0 ENLARGED PROSTATE WITHOUT LOWER URINARY TRACT SYMPTOMS (LUTS): ICD-10-CM

## 2018-06-05 RX ORDER — TAMSULOSIN HYDROCHLORIDE 0.4 MG/1
0.8 CAPSULE ORAL DAILY
Qty: 180 CAPSULE | Refills: 1 | Status: SHIPPED | OUTPATIENT
Start: 2018-06-05 | End: 2019-06-24

## 2018-06-05 NOTE — TELEPHONE ENCOUNTER
Refill on tamulosin     [facility] 90 days  He is out sooner because he doubles up on them when needed

## 2018-06-11 ENCOUNTER — OFFICE VISIT (OUTPATIENT)
Dept: PHYSICAL THERAPY | Facility: CLINIC | Age: 68
End: 2018-06-11
Payer: MEDICARE

## 2018-06-11 DIAGNOSIS — M53.3 SACROILIAC JOINT DYSFUNCTION OF BOTH SIDES: ICD-10-CM

## 2018-06-11 DIAGNOSIS — S39.012A LUMBAR STRAIN, INITIAL ENCOUNTER: ICD-10-CM

## 2018-06-11 DIAGNOSIS — S32.010A CLOSED COMPRESSION FRACTURE OF FIRST LUMBAR VERTEBRA, INITIAL ENCOUNTER: Primary | ICD-10-CM

## 2018-06-11 DIAGNOSIS — M62.9 HAMSTRING TIGHTNESS OF BOTH LOWER EXTREMITIES: ICD-10-CM

## 2018-06-11 PROCEDURE — 97110 THERAPEUTIC EXERCISES: CPT | Performed by: PHYSICAL THERAPIST

## 2018-06-11 NOTE — PROGRESS NOTES
Daily Note     Today's date: 2018  Patient name: Ingrid Kinsey  : 1950  MRN: [id number]  Referring provider: Alexx Cosby DO  Dx:   Encounter Diagnosis     ICD-10-CM    1  [facility] first lumbar [facility] S32 010A    2  Lumbar strain, initial encounter S39 012A    3  Hamstring tightness of both lower extremities M62 9    4  Sacroiliac joint dysfunction of both sides M53 3                   Subjective: Patient notes that the exercises felt good, that he isn't as sore today -   Reports feeling good post session  Objective: See treatment diary below    Manual                         hss   db                                                                                                                           Exercise Diary                     aktc/dktc 5/10s x 10 ea  5/10s x 10                   Ppt 3s x 20  3s x 20                   Cat arch 5s x 10  5s x 10                   ube  1'alt     6'                    hss    20s x 5                                           t-band row / ext    gtbx 20                   TB lpd    gtb x 20                   TB ER    gbx 20                   TB BOR  gtb x 20                                                             Prone 45  90 x 2    2# x 20                                                                                                                                                                                                                                                  Modalities                                                                                                    Assessment: Tolerated treatment well  Patient demonstrated fatigue post treatment and would benefit from continued PT   gtb issued  Plan: Continue per plan of care  Progress treatment as tolerated

## 2018-06-13 ENCOUNTER — APPOINTMENT (OUTPATIENT)
Dept: PHYSICAL THERAPY | Facility: CLINIC | Age: 68
End: 2018-06-13
Payer: MEDICARE

## 2018-06-18 ENCOUNTER — OFFICE VISIT (OUTPATIENT)
Dept: PHYSICAL THERAPY | Facility: CLINIC | Age: 68
End: 2018-06-18
Payer: MEDICARE

## 2018-06-18 DIAGNOSIS — S39.012A LUMBAR STRAIN, INITIAL ENCOUNTER: ICD-10-CM

## 2018-06-18 DIAGNOSIS — M54.50 ACUTE BILATERAL LOW BACK PAIN WITHOUT SCIATICA: ICD-10-CM

## 2018-06-18 DIAGNOSIS — M53.3 SACROILIAC JOINT DYSFUNCTION OF BOTH SIDES: ICD-10-CM

## 2018-06-18 DIAGNOSIS — M62.9 HAMSTRING TIGHTNESS OF BOTH LOWER EXTREMITIES: ICD-10-CM

## 2018-06-18 DIAGNOSIS — S32.010A CLOSED COMPRESSION FRACTURE OF FIRST LUMBAR VERTEBRA, INITIAL ENCOUNTER: Primary | ICD-10-CM

## 2018-06-18 PROCEDURE — G8990 OTHER PT/OT CURRENT STATUS: HCPCS | Performed by: PHYSICAL THERAPIST

## 2018-06-18 PROCEDURE — G8992 OTHER PT/OT  D/C STATUS: HCPCS | Performed by: PHYSICAL THERAPIST

## 2018-06-18 PROCEDURE — 97112 NEUROMUSCULAR REEDUCATION: CPT | Performed by: PHYSICAL THERAPIST

## 2018-06-18 PROCEDURE — 97140 MANUAL THERAPY 1/> REGIONS: CPT | Performed by: PHYSICAL THERAPIST

## 2018-06-18 PROCEDURE — G8991 OTHER PT/OT GOAL STATUS: HCPCS | Performed by: PHYSICAL THERAPIST

## 2018-06-18 PROCEDURE — 97110 THERAPEUTIC EXERCISES: CPT | Performed by: PHYSICAL THERAPIST

## 2018-06-18 NOTE — PROGRESS NOTES
Daily Note     Today's date: 2018  Patient name: Wenceslao Ernst  : 1950  MRN: [id number]  Referring provider: Dori Sow DO  Dx:   Encounter Diagnosis     ICD-10-CM    1  Closed compression fracture of first lumbar vertebra, initial encounter ([facility] [address] ) S32 010A    2  Lumbar strain, initial encounter S39 012A    3  Hamstring tightness of both lower extremities M62 9    4  Sacroiliac joint dysfunction of both sides M53 3                   Subjective: Patient notes overall doing better - states that right lumbar Paraspinals are consistently the area of tightness  Notes that he feels looser after IASTM  Objective: See treatment diary below    Manual                       hss   db  db                                                                                                                         Exercise Diary                   aktc/dktc 5/10s x 10 ea  5/10s x 10  5/10s x 10                 Ppt 3s x 20  3s x 20  3s x 20                 Cat arch 5s x 10  5s x 10  5s x 10                 ube  1'alt     6'  8'                  hss    20s x 5  20s x 5                                         t-band row / ext    gtbx 20  btbx 20                 TB lpd    gtb x 20 btb x 20                 TB ER    gbx 20  gtb x 20                 TB BOR  gtb x 20 btb x 20                        scap retraction   20x                                  Prone 45  90 x 2    2# x 20  2# 20                                                                                                                                                                                                                                               Modalities                        IASTM R lumbar paraspinals 8'                                                                         Assessment: Tolerated treatment well  Progression as noted - tolerated well    Plan: Continue per plan of care    Progress treatment as tolerated  to MD next Monday - patient to contact me after this appointment and further appointments will be scheduled prn

## 2018-06-25 ENCOUNTER — OFFICE VISIT (OUTPATIENT)
Dept: OBGYN CLINIC | Facility: CLINIC | Age: 68
End: 2018-06-25
Payer: MEDICARE

## 2018-06-25 VITALS
SYSTOLIC BLOOD PRESSURE: 125 MMHG | DIASTOLIC BLOOD PRESSURE: 78 MMHG | BODY MASS INDEX: 25.08 KG/M2 | HEIGHT: 74 IN | HEART RATE: 77 BPM | WEIGHT: 195.4 LBS

## 2018-06-25 DIAGNOSIS — S32.010D CLOSED COMPRESSION FRACTURE OF L1 LUMBAR VERTEBRA WITH ROUTINE HEALING, SUBSEQUENT ENCOUNTER: Primary | ICD-10-CM

## 2018-06-25 DIAGNOSIS — M62.830 LUMBAR PARASPINAL MUSCLE SPASM: ICD-10-CM

## 2018-06-25 DIAGNOSIS — M54.50 ACUTE MIDLINE LOW BACK PAIN WITHOUT SCIATICA: ICD-10-CM

## 2018-06-25 DIAGNOSIS — M53.3 SACROILIAC JOINT DYSFUNCTION OF BOTH SIDES: ICD-10-CM

## 2018-06-25 DIAGNOSIS — M62.9 HAMSTRING TIGHTNESS OF BOTH LOWER EXTREMITIES: ICD-10-CM

## 2018-06-25 PROCEDURE — 99213 OFFICE O/P EST LOW 20 MIN: CPT | Performed by: FAMILY MEDICINE

## 2018-06-25 NOTE — PROGRESS NOTES
Assessment/Plan:  Assessment/Plan   Diagnoses and all orders for this visit:    Closed compression fracture of L1 lumbar vertebra with routine healing, subsequent encounter  -     MRI lumbar spine wo contrast; Future    Acute midline low back pain without sciatica  -     MRI lumbar spine wo contrast; Future    Lumbar paraspinal muscle spasm    Sacroiliac joint dysfunction of both sides    Hamstring tightness of both lower extremities        80-year-old male status post L1 compression fracture  Discussed with patient physical exam, impression, and plan  His physical exam is noted for midline lumbar tenderness L1-L3, with bilateral lumbar paraspinal hypertonicity  His lower extremity examination is unremarkable for deficit in muscle strength, reflexes, and sensation  Given that he still continued to have symptoms of pain in the low back despite rest and formal physical therapy,  I will refer him for MRI of the lumbar spine to evaluate for healing of the vertebral fracture as well as for other osseous abnormality of the lumbar spine  He will follow up with me after getting this done  Subjective:   Patient ID: Javed Paula is a 79 y o  male  Chief Complaint   Patient presents with    Spine - Follow-up        80-year-old male following up for low back pain and noted L1 vertebral body compression fraction from imaging on 02/27/2018  He was last seen 7 weeks ago which point he had improvement in low back pain and was prescribed TENS unit, and encouraged to continue with physical therapy  Today reports he has had only little improvement since last visit  Still experiences pain of lumbar spine as described as localized, midline, nonradiating, burning in in character, intermittent, worse with repetitive activity and improved with rest   He has been doing physical therapy and home exercises directed    He reports that physical therapy has helped with his lumbosacral /sacroiliac pain however has not had improvement in the upper lumbar pain  He denies any injury since previous visit any denies any development of lower extremity numbness /tingling, focal weakness, or bowel or bladder incontinence  Back Pain   This is a new problem  The current episode started more than 1 month ago  The problem occurs intermittently  The problem has been unchanged  Associated symptoms include myalgias  Pertinent negatives include no numbness or weakness  The symptoms are aggravated by twisting  He has tried rest (Physical therapy) for the symptoms  The treatment provided mild relief  Review of Systems   Musculoskeletal: Positive for back pain and myalgias  Neurological: Negative for weakness and numbness  Objective:  Vitals:    06/25/18 0944   BP: 125/78   Pulse: 77   Weight: 88 6 kg (195 lb 6 4 oz)   Height: 6' 2" (1 88 m)     Back Exam     Tenderness   The patient is experiencing tenderness in the lumbar  Range of Motion   Lateral Bend Right: normal   Lateral Bend Left: normal   Rotation Right: abnormal   Rotation Left: abnormal     Muscle Strength   The patient has normal back strength  Tests   Straight leg raise right: negative  Straight leg raise left: negative    Reflexes   Patellar: 2/4    Other   Sensation: normal  Gait: normal             Physical Exam   Constitutional: He is oriented to person, place, and time  He appears well-developed  No distress  HENT:   Head: Normocephalic and atraumatic  Eyes: Conjunctivae are normal    Neck: No tracheal deviation present  Cardiovascular: Normal rate  Pulmonary/Chest: Effort normal  No respiratory distress  Abdominal: He exhibits no distension  Neurological: He is alert and oriented to person, place, and time  Skin: Skin is warm and dry  Psychiatric: He has a normal mood and affect  His behavior is normal    Nursing note and vitals reviewed

## 2018-06-26 NOTE — PROGRESS NOTES
PT Discharge    Today's date: 2018  Patient name: Jonatan Purcell  : 1950  MRN: [id number]  Referring provider: Lyle Arias DO  Dx:   Encounter Diagnosis     ICD-10-CM    1  Closed compression fracture of first lumbar vertebra, initial encounter ([facility] [address] ) S32 010A    2  Lumbar strain, initial encounter S39 012A    3  Hamstring tightness of both lower extremities M62 9    4  Sacroiliac joint dysfunction of both sides M53 3    5  Acute bilateral low back pain without sciatica M54 5        Start Time: 0800  Stop Time: 0850  Total time in clinic (min): 50 minutes    Assessment  Impairments: abnormal or restricted ROM, activity intolerance, lacks appropriate home exercise program and pain with function    Assessment details: Patient contacted department today stating that he saw MD yesterday and is getting an MRI later this week due to continued pain and discomfort T/L spine  Patient reports over the phone that he "really doesn't feel any better"  And has continued pain and discomfort  Original goals not completely achieved  Patient did demonstrate competency with HEp to date  Understanding of Dx/Px/POC: good   Prognosis: good    Goals  STG  1  Decrease pain by at least two subjective ratings at worst in 4 weeks   NOT MET  2  Increase trunk AROM to [facility]  4-6 weeks  Partially MET  3  Improve postural awareness and self correction 4 weeks  Partially MET  LTG  1  Independent with HEP  MET  2  Return to prior functional level, exercises regimen without discomfort produced  6 weeks  NOT MET  3  Abolish pain T/L region  6-8 weeks  NOT MET      Plan  Planned therapy interventions: home exercise program  Treatment plan discussed with: patient  Plan details: Per patient request - discharge skilled PT at this time  Formal reassessment is unavailable  Patient was seen for IE and  Additional appointments only    Patient is competent with HEP and will be discharged to such - follow up with MD prn  Thank you for this referral          Subjective Evaluation    Pain  Current pain ratin  At best pain ratin  At worst pain ratin  Location: across LS region  Quality: throbbing  Relieving factors: medications  Aggravating factors: standing  Progression: improved    Social Support  Steps to enter house: no  Stairs in house: no   Lives in: apartment  Lives with: alone    Employment status: working (self employed)  Hand dominance: right      Diagnostic Tests  X-ray: abnormal  Treatments  No previous or current treatments        Objective     Special Questions  Negative for night pain, disturbed sleep, bladder dysfunction, bowel dysfunction and saddle (S4) numbness    Postural Observations  Seated posture: fair  Standing posture: fair  Correction of posture: has no consistent effect    Additional Postural Observation Details  Patient educated on posturing and appears to be self correcting more  Palpation   Left   Muscle spasm in the lumbar paraspinals  Right   Muscle spasm in the lumbar paraspinals  Left Hip Palpation Comments   Lumbar paraspinals: mild along lumbar spine  Tenderness     Lumbar Spine  Tenderness in the spinous process (L1 region)  Left Hip   No tenderness in the PSIS  Right Hip   No tenderness in the PSIS  Neurological Testing     Sensation     Lumbar   Left   Intact: light touch    Right   Intact: light touch    Active Range of Motion     Lumbar   Flexion: Active lumbar flexion: mild pain produced with return to stand  with pain  Extension: Active lumbar extension: mod loss  Additional Active Range of Motion Details  RFIS  - NE  (mild discomfort with return to stand reported)  TASNEEM -  NE  RFIL - NE  REIL - NE   Mod loss of movement    Lumbar PA mobs - NE except L1-2 region- P mild discomfort, NE        Strength/Myotome Testing     Lumbar   Left   Normal strength    Right   Normal strength    Tests       Thoracic   Negative slump       Lumbar   Negative repeated flexion, repeated extension and slump  Left   Negative passive SLR  Right   Negative passive SLR  Additional Tests Details  SLR 85 degrees bilaterally     General Comments     Lumbar Comments  GAIT - non-antalgic, heel - toe progression         Flowsheet Rows      Most Recent Value   PT/OT G-Codes   Current Score  75   Projected Score  73   FOTO information reviewed  Yes   Assessment Type  Re-evaluation [foto only]   G code set  Other PT/OT Primary   Other PT Primary Current Status ()  CJ   Other PT Primary Goal Status ()  CJ   Other PT Primary Discharge Status ()  CK          Precautions: L1 compression fx 2-27-18  lbp

## 2018-07-07 ENCOUNTER — HOSPITAL ENCOUNTER (OUTPATIENT)
Dept: MRI IMAGING | Facility: HOSPITAL | Age: 68
Discharge: HOME | End: 2018-07-07
Payer: MEDICARE

## 2018-07-07 DIAGNOSIS — M54.50 ACUTE MIDLINE LOW BACK PAIN WITHOUT SCIATICA: ICD-10-CM

## 2018-07-07 DIAGNOSIS — S32.010D CLOSED COMPRESSION FRACTURE OF L1 LUMBAR VERTEBRA WITH ROUTINE HEALING, SUBSEQUENT ENCOUNTER: ICD-10-CM

## 2018-07-07 PROCEDURE — 72148 MRI LUMBAR SPINE W/O DYE: CPT

## 2018-07-16 ENCOUNTER — OFFICE VISIT (OUTPATIENT)
Dept: OBGYN CLINIC | Facility: CLINIC | Age: 68
End: 2018-07-16
Payer: MEDICARE

## 2018-07-16 VITALS
HEIGHT: 73 IN | BODY MASS INDEX: 26.24 KG/M2 | WEIGHT: 198 LBS | HEART RATE: 77 BPM | SYSTOLIC BLOOD PRESSURE: 123 MMHG | DIASTOLIC BLOOD PRESSURE: 85 MMHG

## 2018-07-16 DIAGNOSIS — M47.819 FACET ARTHROPATHY: ICD-10-CM

## 2018-07-16 DIAGNOSIS — M48.061 NEUROFORAMINAL STENOSIS OF LUMBAR SPINE: ICD-10-CM

## 2018-07-16 DIAGNOSIS — S32.010D CLOSED COMPRESSION FRACTURE OF L1 LUMBAR VERTEBRA WITH ROUTINE HEALING, SUBSEQUENT ENCOUNTER: ICD-10-CM

## 2018-07-16 DIAGNOSIS — M51.37 DEGENERATIVE DISC DISEASE AT L5-S1 LEVEL: ICD-10-CM

## 2018-07-16 DIAGNOSIS — M47.816 OSTEOARTHRITIS OF LUMBAR SPINE, UNSPECIFIED SPINAL OSTEOARTHRITIS COMPLICATION STATUS: Primary | ICD-10-CM

## 2018-07-16 PROCEDURE — 99213 OFFICE O/P EST LOW 20 MIN: CPT | Performed by: FAMILY MEDICINE

## 2018-07-16 NOTE — PROGRESS NOTES
Assessment/Plan:  Assessment/Plan   Diagnoses and all orders for this visit:    Osteoarthritis of lumbar spine, unspecified spinal osteoarthritis complication status  -     Ambulatory referral to Pain Management; Future    Degenerative disc disease at L5-S1 level  -     Ambulatory referral to Pain Management; Future    Neuroforaminal stenosis of lumbar spine  -     Ambulatory referral to Pain Management; Future    Facet arthropathy ([facility] [address] )  -     Ambulatory referral to Pain Management; Future    Closed compression fracture of L1 lumbar vertebra with routine healing, subsequent encounter         72-year-old male with low back pain more than 4 months duration  Discussed with patient physical exam, MRI findings, impression, and plan  MRI is noted for multilevel degenerative spondylosis, and bulging annulus levels L2-L4, L4-S1 degenerative disc disease, facet hypertrophy, and mild bilateral foraminal stenosis  MRI ss also noted chronic end-plate compression deformity at T12 and L1  Given that he still continues to have pain with conservative therapy in the form of oral medication and physical therapy, I will refer him to pain management for evaluation and further recommendation of treatment options  Subjective:   Patient ID: Janet Orlando is a 79 y o  male  Chief Complaint   Patient presents with    Left Hip - Pain, Follow-up        72-year-old male following up for low back pain more than 4 months duration  He was last seen 3 weeks ago at which point he was referred for MRI of the lumbar spine  Today reports he has not had any change in his pain  Pain is described as localized to the lumbar spine bilaterally, constant, achy, nonradiating, worse with prolonged standing and squatting, and improved with rest  He states that he has unable to tolerate walking for prolonged distances due to pain, especially when carrying weight in his arm such as a bag or case    He states that previously was able to walk a few city blocks, however has noticed he has had to take breaks while walking  He does report that when he is at the gym and using the leg press machine with back properly supported he does not have any worsening of pain, but has worsening of pain whenever he does squats  He denies any numbness / tingling of lower extremities or any bowel or bladder incontinence  Back Pain   This is a new problem  The current episode started more than 1 month ago  The problem occurs constantly  The problem has been gradually improving  Associated symptoms include myalgias  Pertinent negatives include no numbness or weakness  The symptoms are aggravated by walking (Squatting)  He has tried rest (Physical therapy) for the symptoms  The treatment provided mild relief  Review of Systems   Musculoskeletal: Positive for back pain and myalgias  Neurological: Negative for weakness and numbness  Objective:  Vitals:    07/16/18 0907   BP: 123/85   Pulse: 77   Weight: 89 8 kg (198 lb)   Height: 6' 1" (1 854 m)     Right Hip Exam     Muscle Strength   Flexion: 5/5       Left Hip Exam     Muscle Strength   Flexion: 5/5       Back Exam     Range of Motion   Extension: normal   Flexion: normal   Lateral Bend Right: normal   Lateral Bend Left: normal   Rotation Right: abnormal   Rotation Left: abnormal     Muscle Strength   The patient has normal back strength  Other   Sensation: normal            Physical Exam   Constitutional: He is oriented to person, place, and time  He appears well-developed  No distress  HENT:   Head: Normocephalic and atraumatic  Eyes: Conjunctivae are normal    Neck: No tracheal deviation present  Cardiovascular: Normal rate  Pulmonary/Chest: Effort normal  No respiratory distress  Abdominal: He exhibits no distension  Neurological: He is alert and oriented to person, place, and time  Skin: Skin is warm and dry  Psychiatric: He has a normal mood and affect   His behavior is normal  Nursing note and vitals reviewed  I have personally reviewed pertinent films in PACS and my interpretation is Multilevel lumbar disc bulge, thoracolumbar levoscoliosis

## 2019-06-24 ENCOUNTER — OFFICE VISIT (OUTPATIENT)
Dept: FAMILY MEDICINE CLINIC | Facility: CLINIC | Age: 69
End: 2019-06-24
Payer: MEDICARE

## 2019-06-24 VITALS
SYSTOLIC BLOOD PRESSURE: 132 MMHG | BODY MASS INDEX: 28.31 KG/M2 | TEMPERATURE: 97.9 F | HEART RATE: 78 BPM | WEIGHT: 213.6 LBS | HEIGHT: 73 IN | RESPIRATION RATE: 18 BRPM | DIASTOLIC BLOOD PRESSURE: 78 MMHG

## 2019-06-24 DIAGNOSIS — G57.81: ICD-10-CM

## 2019-06-24 DIAGNOSIS — B35.4 TINEA CORPORIS: ICD-10-CM

## 2019-06-24 DIAGNOSIS — L98.9 SKIN LESION OF NECK: Primary | ICD-10-CM

## 2019-06-24 PROCEDURE — 88305 TISSUE EXAM BY PATHOLOGIST: CPT | Performed by: PATHOLOGY

## 2019-06-24 PROCEDURE — 11102 TANGNTL BX SKIN SINGLE LES: CPT | Performed by: FAMILY MEDICINE

## 2019-06-24 PROCEDURE — 99214 OFFICE O/P EST MOD 30 MIN: CPT | Performed by: FAMILY MEDICINE

## 2019-06-24 RX ORDER — CLOTRIMAZOLE AND BETAMETHASONE DIPROPIONATE 10; .64 MG/G; MG/G
CREAM TOPICAL 2 TIMES DAILY
Qty: 45 G | Refills: 0 | Status: SHIPPED | OUTPATIENT
Start: 2019-06-24 | End: 2019-09-12 | Stop reason: HOSPADM

## 2019-09-04 ENCOUNTER — OFFICE VISIT (OUTPATIENT)
Dept: FAMILY MEDICINE CLINIC | Facility: CLINIC | Age: 69
End: 2019-09-04
Payer: MEDICARE

## 2019-09-04 VITALS
RESPIRATION RATE: 16 BRPM | TEMPERATURE: 97.8 F | DIASTOLIC BLOOD PRESSURE: 60 MMHG | HEART RATE: 66 BPM | HEIGHT: 73 IN | SYSTOLIC BLOOD PRESSURE: 144 MMHG | BODY MASS INDEX: 27.04 KG/M2 | OXYGEN SATURATION: 97 % | WEIGHT: 204 LBS

## 2019-09-04 DIAGNOSIS — Z13.6 SCREENING FOR CARDIOVASCULAR, RESPIRATORY, AND GENITOURINARY DISEASES: ICD-10-CM

## 2019-09-04 DIAGNOSIS — Z13.83 SCREENING FOR CARDIOVASCULAR, RESPIRATORY, AND GENITOURINARY DISEASES: ICD-10-CM

## 2019-09-04 DIAGNOSIS — Z13.89 SCREENING FOR CARDIOVASCULAR, RESPIRATORY, AND GENITOURINARY DISEASES: ICD-10-CM

## 2019-09-04 DIAGNOSIS — C43.9 MALIGNANT MELANOMA OF SKIN (HCC): ICD-10-CM

## 2019-09-04 DIAGNOSIS — Z12.11 COLON CANCER SCREENING: ICD-10-CM

## 2019-09-04 DIAGNOSIS — Z13.1 SCREENING FOR DIABETES MELLITUS (DM): ICD-10-CM

## 2019-09-04 DIAGNOSIS — M79.89 LEG SWELLING: ICD-10-CM

## 2019-09-04 DIAGNOSIS — Z00.00 MEDICARE ANNUAL WELLNESS VISIT, INITIAL: Primary | ICD-10-CM

## 2019-09-04 DIAGNOSIS — R35.0 URINARY FREQUENCY: ICD-10-CM

## 2019-09-04 DIAGNOSIS — Z12.5 PROSTATE CANCER SCREENING: ICD-10-CM

## 2019-09-04 DIAGNOSIS — R19.09 ABDOMINAL MASS OF OTHER SITE: ICD-10-CM

## 2019-09-04 PROBLEM — L98.9 SKIN LESION OF NECK: Status: RESOLVED | Noted: 2019-06-24 | Resolved: 2019-09-04

## 2019-09-04 PROBLEM — M54.50 ACUTE BILATERAL LOW BACK PAIN WITHOUT SCIATICA: Status: RESOLVED | Noted: 2018-04-06 | Resolved: 2019-09-04

## 2019-09-04 PROBLEM — R19.00 ABDOMINAL LUMP: Status: ACTIVE | Noted: 2019-09-04

## 2019-09-04 PROCEDURE — G0438 PPPS, INITIAL VISIT: HCPCS | Performed by: FAMILY MEDICINE

## 2019-09-04 PROCEDURE — 99214 OFFICE O/P EST MOD 30 MIN: CPT | Performed by: FAMILY MEDICINE

## 2019-09-04 NOTE — PROGRESS NOTES
Assessment/Plan:    No problem-specific Assessment & Plan notes found for this encounter  Abdominal mass, CT abd/pelvis with contrast, new  Also needs colonoscopy  Leg swelling related? R/o venous obstruction vs lymphoma  Check UA with labs  Hx of MM, current hx of skin SCC upper chest pending removal     Diagnoses and all orders for this visit:    Medicare annual wellness visit, initial    Abdominal mass of other site  -     CBC and differential; Future  -     CT abdomen pelvis w contrast; Future    Leg swelling    Screening for cardiovascular, respiratory, and genitourinary diseases  -     Lipid Panel with Direct LDL reflex; Future    Screening for diabetes mellitus (DM)  -     Comprehensive metabolic panel; Future    Prostate cancer screening  -     PSA, Total Screen; Future    Urinary frequency  -     UA w Reflex to Microscopic w Reflex to Culture; Future    Colon cancer screening    Malignant melanoma of skin ([facility] [address] )        Return if symptoms worsen or fail to improve  Subjective:      Patient ID: Kendall Douglass is a 81 y o  male  Chief Complaint   Patient presents with    Urinary issues     [id number]       HPI  Stomach lump noted, no pain with palpitation but makes him want to urinate  Noted lump in abdomen about 3-4m  Abdomen enlarging over years, thinks it was diet and lack of exercise  Trying to lose wt with exercise  Not enlarging  Bowels normal  No unusual wt loss  No blood in stool  Due for colonoscopy  Drinking a lot of water    No dysuria  Urine is yellow  Nocturia x 3-4x lately  Some urine leakage at night per pt  Stream is strong  No fever    No urologist  New b/l leg swelling around few months    The following portions of the patient's history were reviewed and updated as appropriate: allergies, current medications, past family history, past medical history, past social history, past surgical history and problem list     Review of Systems   Respiratory: Negative for shortness of breath  Cardiovascular: Negative for chest pain  Gastrointestinal: Positive for abdominal distention  Negative for bowel incontinence  Psychiatric/Behavioral: The patient is not nervous/anxious  Current Outpatient Medications   Medication Sig Dispense Refill    clotrimazole-betamethasone (LOTRISONE) 1-0 05 % cream Apply topically 2 (two) times a day Right flank area  As instructed 45 g 0    fluticasone (FLONASE) 50 mcg/act nasal spray 2 puffs into each nostril daily       No current facility-administered medications for this visit  Objective:    /60   Pulse 66   Temp 97 8 °F (36 6 °C)   Resp 16   Ht 6' 1" (1 854 m)   Wt 92 5 kg (204 lb)   SpO2 97%   BMI 26 91 kg/m²        Physical Exam   Constitutional: He appears well-developed  No distress  HENT:   Head: Normocephalic  Right Ear: External ear normal    Left Ear: External ear normal    Mouth/Throat: No oropharyngeal exudate  Eyes: Conjunctivae are normal  No scleral icterus  Neck: Neck supple  Cardiovascular: Normal rate, regular rhythm, normal heart sounds and intact distal pulses  No murmur heard  Pulmonary/Chest: Effort normal and breath sounds normal  No respiratory distress  He has no rales  Abdominal: Soft  Bowel sounds are normal  He exhibits mass  He exhibits no distension  There is no tenderness  Musculoskeletal: He exhibits no edema or deformity  Lymphadenopathy:     He has no cervical adenopathy  Neurological: He is alert  Skin: Skin is warm and dry  No pallor  SCC upper chest    Psychiatric: His behavior is normal  Thought content normal    Nursing note and vitals reviewed               Dotty Junes, DO   Assessment and Plan:     Problem List Items Addressed This Visit        Active Problems    Abdominal lump    Relevant Orders    CBC and differential    CT abdomen pelvis w contrast    Colon cancer screening    Leg swelling    Malignant melanoma of skin ([address] )    Medicare annual wellness visit, initial - Primary    Prostate cancer screening    Relevant Orders    PSA, Total Screen    Screening for cardiovascular, respiratory, and genitourinary diseases    Relevant Orders    Lipid Panel with Direct LDL reflex    Screening for diabetes mellitus (DM)    Relevant Orders    Comprehensive metabolic panel    Urinary frequency    Relevant Orders    UA w Reflex to Microscopic w Reflex to Culture         History of Present Illness:     Patient presents for Medicare Annual Wellness visit    Patient Care Team:  Prince Gibson DO as PCP - Amina Zepeda MD     Problem List:     Patient Active Problem List   Diagnosis    Actinic keratosis    Allergic rhinitis    Benign colon polyp    Calcification of bladder    Colon, diverticulosis    Enlarged prostate without lower urinary tract symptoms (luts)    Low HDL (under 40)    Malignant melanoma of skin ([facility] [address]    Pre-diabetes    Renal colic    Inguinal pain, right    Nerve entrapment syndrome, inguinal, right    Tinea corporis    Abdominal lump    Leg swelling    Screening for cardiovascular, respiratory, and genitourinary diseases    Screening for diabetes mellitus (DM)    Prostate cancer screening    Urinary frequency    Colon cancer screening    Medicare annual wellness visit, initial      Past Medical and Surgical History:     No past medical history on file    Past Surgical History:   Procedure Laterality Date    HERNIA REPAIR        Family History:     Family History   Problem Relation Age of Onset    Diabetes Mother     Heart failure Father       Social History:     Social History     Tobacco Use   Smoking Status Former Smoker    Last attempt to quit:     Years since quittin 6   Smokeless Tobacco Never Used     Social History     Substance and Sexual Activity   Alcohol Use Not on file     Social History     Substance and Sexual Activity   Drug Use Not on file      Medications and Allergies:     Current Outpatient Medications   Medication Sig Dispense Refill    clotrimazole-betamethasone (LOTRISONE) 1-0 05 % cream Apply topically 2 (two) times a day Right flank area  As instructed 45 g 0    fluticasone (FLONASE) 50 mcg/act nasal spray 2 puffs into each nostril daily       No current facility-administered medications for this visit  Allergies   Allergen Reactions    Aspirin GI Intolerance     Other reaction(s): GI upset  Reaction Date: [id number]; Other reaction(s): gi complications  Reaction Date: [id number];       Immunizations:     Immunization History   Administered Date(s) Administered    Tdap 09/27/2010      Medicare Screening Tests and Risk Assessments:     Cesario Ledesma is here for his Initial Wellness visit  Last Medicare Wellness visit information reviewed, patient interviewed and updates made to the record today  Health Risk Assessment:  Patient rates overall health as excellent  Patient feels that their physical health rating is Same  Eyesight was rated as Same  Hearing was rated as Same  Patient feels that their emotional and mental health rating is Same  Patient's pain rating has been 4/10  Patient states that he has experienced no weight loss or gain in last 6 months  Emotional/Mental Health:  Patient has not been feeling nervous/anxious  PHQ-9 Depression Screening:    Frequency of the following problems over the past two weeks:      1  Little interest or pleasure in doing things: 0 - not at all      2  Feeling down, depressed, or hopeless: 0 - not at all  PHQ-2 Score: 0          Broken Bones/Falls: Fall Risk Assessment:    In the past year, patient has experienced: No history of falling in past year          Bladder/Bowel:  Patient has not leaked urine accidently in the last six months  Patient reports no loss of bowel control  Immunizations:  Patient has not had a flu vaccination within the last year  Patient has not received a pneumonia shot  Patient has not received a shingles shot  Patient has received tetanus/diphtheria shot  Home Safety:  Patient does not have trouble with stairs inside or outside of their home  Patient currently reports that there are no safety hazards present in home, working smoke alarms, no working carbon monoxide detectors  Preventative Screenings:   prostate cancer screen performed, colon cancer screen completed, cholesterol screen completed, glaucoma eye exam completed,     Nutrition:  Current diet: Regular with servings of the following:    Medications:  Patient is not currently taking any over-the-counter supplements  Patient is able to manage medications  Lifestyle Choices:  Patient reports no tobacco use  Patient has smoked or used tobacco in the past   Patient has stopped his tobacco use  Tobacco use quit date: 2016  Patient reports no alcohol use  Patient drives a vehicle  Patient wears seat belt  Current level of exercise of physical activity described by patient as: walk, gym  Activities of Daily Living:  Can get out of bed by his or her self, able to dress self, able to make own meals, able to do own shopping, able to bathe self, can do own laundry/housekeeping, can manage own money, pay bills and track expenses    Previous Hospitalizations:  No hospitalization or ED visit in past 12 months        Advanced Directives:  Patient has not decided on power of   Patient has not completed advanced directive          Preventative Screening/Counseling:      Cardiovascular:      General: Risks and Benefits Discussed          Diabetes:      General: Risks and Benefits Discussed          Colorectal Cancer:      General: Risks and Benefits Discussed          Prostate Cancer:      General: Risks and Benefits Discussed          Osteoporosis:      General: Screening Not Indicated          AAA:      General: Screening Not Indicated          Glaucoma:      General: Risks and Benefits Discussed          HIV:      General: Screening Not Indicated          Hepatitis C:      General: Patient Declines        Advanced Directives:   Patient has no living will for healthcare, No end of life assessment reviewed with patient

## 2019-09-05 NOTE — PATIENT INSTRUCTIONS
Obesity   AMBULATORY CARE:   Obesity  is when your body mass index (BMI) is greater than 30  Your healthcare provider will use your height and weight to measure your BMI  The risks of obesity include  many health problems, such as injuries or physical disability  You may need tests to check for the following:  · Diabetes     · High blood pressure or high cholesterol     · Heart disease     · Gallbladder or liver disease     · Cancer of the colon, breast, prostate, liver, or kidney     · Sleep apnea     · Arthritis or gout  Seek care immediately if:   · You have a severe headache, confusion, or difficulty speaking  · You have weakness on one side of your body  · You have chest pain, sweating, or shortness of breath  Contact your healthcare provider if:   · You have symptoms of gallbladder or liver disease, such as pain in your upper abdomen  · You have knee or hip pain and discomfort while walking  · You have symptoms of diabetes, such as intense hunger and thirst, and frequent urination  · You have symptoms of sleep apnea, such as snoring or daytime sleepiness  · You have questions or concerns about your condition or care  Treatment for obesity  focuses on helping you lose weight to improve your health  Even a small decrease in BMI can reduce the risk for many health problems  Your healthcare provider will help you set a weight-loss goal   · Lifestyle changes  are the first step in treating obesity  These include making healthy food choices and getting regular physical activity  Your healthcare provider may suggest a weight-loss program that involves coaching, education, and therapy  · Medicine  may help you lose weight when it is used with a healthy diet and physical activity  · Surgery  can help you lose weight if you are very obese and have other health problems  There are several types of weight-loss surgery  Ask your healthcare provider for more information    Be successful losing weight:   · Set small, realistic goals  An example of a small goal is to walk for 20 minutes 5 days a week  Anther goal is to lose 5% of your body weight  · Tell friends, family members, and coworkers about your goals  and ask for their support  Ask a friend to lose weight with you, or join a weight-loss support group  · Identify foods or triggers that may cause you to overeat , and find ways to avoid them  Remove tempting high-calorie foods from your home and workplace  Place a bowl of fresh fruit on your kitchen counter  If stress causes you to eat, then find other ways to cope with stress  · Keep a diary to track what you eat and drink  Also write down how many minutes of physical activity you do each day  Weigh yourself once a week and record it in your diary  Eating changes: You will need to eat 500 to 1,000 fewer calories each day than you currently eat to lose 1 to 2 pounds a week  The following changes will help you cut calories:  · Eat smaller portions  Use small plates, no larger than 9 inches in diameter  Fill your plate half full of fruits and vegetables  Measure your food using measuring cups until you know what a serving size looks like  · Eat 3 meals and 1 or 2 snacks each day  Plan your meals in advance  [employer] and eat at home most of the time  Eat slowly  · Eat fruits and vegetables at every meal   They are low in calories and high in fiber, which makes you feel full  Do not add butter, margarine, or cream sauce to vegetables  Use herbs to season steamed vegetables  · Eat less fat and fewer fried foods  Eat more baked or grilled chicken and fish  These protein sources are lower in calories and fat than red meat  Limit fast food  Dress your salads with olive oil and vinegar instead of bottled dressing  · Limit the amount of sugar you eat  Do not drink sugary beverages  Limit alcohol  Activity changes:  Physical activity is good for your body in many ways   It helps you burn calories and build strong muscles  It decreases stress and depression, and improves your mood  It can also help you sleep better  Talk to your healthcare provider before you begin an exercise program   · Exercise for at least 30 minutes 5 days a week  Start slowly  Set aside time each day for physical activity that you enjoy and that is convenient for you  It is best to do both weight training and an activity that increases your heart rate, such as walking, bicycling, or swimming  · Find ways to be more active  Do yard work and housecleaning  Walk up the stairs instead of using elevators  Spend your leisure time going to events that require walking, such as outdoor festivals or fairs  This extra physical activity can help you lose weight and keep it off  Follow up with your healthcare provider as directed: You may need to meet with a dietitian  Write down your questions so you remember to ask them during your visits  © 2017 [address] [employer] Information is for End User's use only and may not be sold, redistributed or otherwise used for commercial purposes  All illustrations and images included in CareNotes® are the copyrighted property of [employer]  or Ramesh Conley  The above information is an  only  It is not intended as medical advice for individual conditions or treatments  Talk to your doctor, nurse or pharmacist before following any medical regimen to see if it is safe and effective for you  Urinary Incontinence   WHAT YOU NEED TO KNOW:   What is urinary incontinence? Urinary incontinence (UI) is when you lose control of your bladder  What causes UI? UI occurs because your bladder cannot store or empty urine properly  The following are the most common types of UI:  · Stress incontinence  is when you leak urine due to increased bladder pressure  This may happen when you cough, sneeze, or exercise       · Urge incontinence  is when you feel the need to urinate right away and leak urine accidentally  · Mixed incontinence  is when you have both stress and urge UI  What are the signs and symptoms of UI?   · You feel like your bladder does not empty completely when you urinate  · You urinate often and need to urinate immediately  · You leak urine when you sleep, or you wake up with the urge to urinate  · You leak urine when you cough, sneeze, exercise, or laugh  How is UI diagnosed? Your healthcare provider will ask how often you leak urine and whether you have stress or urge symptoms  Tell him which medicines you take, how often you urinate, and how much liquid you drink each day  You may need any of the following tests:  · Urine tests  may show infection or kidney function  · A pelvic exam  may be done to check for blockages  A pelvic exam will also show if your bladder, uterus, or other organs have moved out of place  · An x-ray, ultrasound, or CT  may show problems with parts of your urinary system  You may be given contrast liquid to help your organs show up better in the pictures  Tell the healthcare provider if you have ever had an allergic reaction to contrast liquid  Do not enter the MRI room with anything metal  Metal can cause serious injury  Tell the healthcare provider if you have any metal in or on your body  · A bladder scan  will show how much urine is left in your bladder after you urinate  You will be asked to urinate and then healthcare providers will use a small ultrasound machine to check the urine left in your bladder  · Cystometry  is used to check the function of your urinary system  Your healthcare provider checks the pressure in your bladder while filling it with fluid  Your bladder pressure may also be tested when your bladder is full and while you urinate  How is UI treated? · Medicines  can help strengthen your bladder control      · Electrical stimulation  is used to send a small amount of electrical energy to your pelvic floor muscles  This helps control your bladder function  Electrodes may be placed outside your body or in your rectum  For women, the electrodes may be placed in the vagina  · A bulking agent  may be injected into the wall of your urethra to make it thicker  This helps keep your urethra closed and decreases urine leakage  · Devices  such as a clamp, pessary, or tampon may help stop urine leaks  Ask your healthcare provider for more information about these and other devices  · Surgery  may be needed if other treatments do not work  Several types of surgery can help improve your bladder control  Ask your healthcare provider for more information about the surgery you may need  How can I manage my symptoms? · Do pelvic muscle exercises often  Your pelvic muscles help you stop urinating  Squeeze these muscles tight for 5 seconds, then relax for 5 seconds  Gradually work up to squeezing for 10 seconds  Do 3 sets of 15 repetitions a day, or as directed  This will help strengthen your pelvic muscles and improve bladder control  · A catheter  may be used to help empty your bladder  A catheter is a tiny, plastic tube that is put into your bladder to drain your urine  Your healthcare provider may tell you to use a catheter to prevent your bladder from getting too full and leaking urine  · Keep a UI record  Write down how often you leak urine and how much you leak  Make a note of what you were doing when you leaked urine  · Train your bladder  Go to the bathroom at set times, such as every 2 hours, even if you do not feel the urge to go  You can also try to hold your urine when you feel the urge to go  For example, hold your urine for 5 minutes when you feel the urge to go  As that becomes easier, hold your urine for 10 minutes  · Drink liquids as directed  Ask your healthcare provider how much liquid to drink each day and which liquids are best for you   You may need to limit the amount of liquid you drink to help control your urine leakage  Limit or do not have drinks that contain caffeine or alcohol  Do not drink any liquid right before you go to bed  · Prevent constipation  Eat a variety of high-fiber foods  Good examples are high-fiber cereals, beans, vegetables, and whole-grain breads  Prune juice may help make your bowel movement softer  Walking is the best way to trigger your intestines to have a bowel movement  · Exercise regularly and maintain a healthy weight  Ask your healthcare provider how much you should weigh and about the best exercise plan for you  Weight loss and exercise will decrease pressure on your bladder and help you control your leakage  Ask him to help you create a weight loss plan if you are overweight  When should I seek immediate care? · You have severe pain  · You are confused or cannot think clearly  When should I contact my healthcare provider? · You have a fever  · You see blood in your urine  · You have pain when you urinate  · You have new or worse pain, even after treatment  · Your mouth feels dry or you have vision changes  · Your urine is cloudy or smells bad  · You have questions or concerns about your condition or care  CARE AGREEMENT:   You have the right to help plan your care  Learn about your health condition and how it may be treated  Discuss treatment options with your caregivers to decide what care you want to receive  You always have the right to refuse treatment  The above information is an  only  It is not intended as medical advice for individual conditions or treatments  Talk to your doctor, nurse or pharmacist before following any medical regimen to see if it is safe and effective for you  © 2017 [address] [employer] Information is for End User's use only and may not be sold, redistributed or otherwise used for commercial purposes   All illustrations and images included in CareNotes® are the copyrighted property of [employer]  or Ramesh Conley  Cigarette Smoking and Your Health   AMBULATORY CARE:   Risks to your health if you smoke:  Nicotine and other chemicals found in tobacco damage every cell in your body  Even if you are a light smoker, you have an increased risk for cancer, heart disease, and lung disease  If you are pregnant or have diabetes, smoking increases your risk for complications  Benefits to your health if you stop smoking:   · You decrease respiratory symptoms such as coughing, wheezing, and shortness of breath  · You reduce your risk for cancers of the lung, mouth, throat, kidney, bladder, pancreas, stomach, and cervix  If you already have cancer, you increase the benefits of chemotherapy  You also reduce your risk for cancer returning or a second cancer from developing  · You reduce your risk for heart disease, blood clots, heart attack, and stroke  · You reduce your risk for lung infections, and diseases such as pneumonia, asthma, chronic bronchitis, and emphysema  · Your circulation improves  More oxygen can be delivered to your body  If you have diabetes, you lower your risk for complications, such as kidney, artery, and eye diseases  You also lower your risk for nerve damage  Nerve damage can lead to amputations, poor vision, and blindness  · You improve your body's ability to heal and to fight infections  Benefits to the health of others if you stop smoking:  Tobacco is harmful to nonsmokers who breathe in your secondhand smoke  The following are ways the health of others around you may improve when you stop smoking:  · You lower the risks for lung cancer and heart disease in nonsmoking adults  · If you are pregnant, you lower the risk for miscarriage, early delivery, low birth weight, and stillbirth  You also lower your baby's risk for SIDS, obesity, developmental delay, and neurobehavioral problems, such as ADHD  · If you have children, you lower their risk for ear infections, colds, pneumonia, bronchitis, and asthma  For more information and support to stop smoking:   · Smokefree  gov  Phone: 7- 216 - 779-8375  Web Address: www smokefrNorth [facility] [employer]  Follow up with your healthcare provider as directed:  Write down your questions so you remember to ask them during your visits  © 2017 [address] [employer] Information is for End User's use only and may not be sold, redistributed or otherwise used for commercial purposes  All illustrations and images included in CareNotes® are the copyrighted property of [employer]  or Ramesh Conley  The above information is an  only  It is not intended as medical advice for individual conditions or treatments  Talk to your doctor, nurse or pharmacist before following any medical regimen to see if it is safe and effective for you  Fall Prevention   WHAT YOU NEED TO KNOW:   What is fall prevention? Fall prevention includes ways to make your home and other areas safer  It also includes ways you can move more carefully to prevent a fall  What increases my risk for falls? · Lack of vitamin D    · Not getting enough sleep each night    · Trouble walking or keeping your balance, or foot problems    · Health conditions that cause changes in your blood pressure, vision, or muscle strength and coordination    · Medicines that make you dizzy, weak, or sleepy    · Problems seeing clearly    · Shoes that have high heels or are not supportive    · Tripping hazards, such as items left on the floor, no handrails on the stairs, or broken steps  How can I help protect myself from falls? · Stand or sit up slowly  This may help you keep your balance and prevent falls  If you need to get up during the night, sit up first  Be sure you are fully awake before you stand  Turn on the light before you start walking  Go slowly in case you are still sleepy   Make sure you will not trip over any pets sleeping in the bedroom  · Use assistive devices as directed  Your healthcare provider may suggest that you use a cane or walker to help you keep your balance  You may need to have grab bars put in your bathroom near the toilet or in the shower  · Wear shoes that fit well and have soles that   Wear shoes both inside and outside  Use slippers with good   Do not wear shoes with high heels  · Wear a personal alarm  This is a device that allows you to call 911 if you fall and need help  Ask your healthcare provider for more information  · Stay active  Exercise can help strengthen your muscles and improve your balance  Your healthcare provider may recommend water aerobics or walking  He or she may also recommend physical therapy to improve your coordination  Never start an exercise program without talking to your healthcare provider first      · Manage medical conditions  Keep all appointments with your healthcare providers  Visit your eye doctor as directed  How can I make my home safer? · Add items to prevent falls in the bathroom  Put nonslip strips on your bath or shower floor to prevent you from slipping  Use a bath mat if you do not have carpet in the bathroom  This will prevent you from falling when you step out of the bath or shower  Use a shower seat so you do not need to stand while you shower  Sit on the toilet or a chair in your bathroom to dry yourself and put on clothing  This will prevent you from losing your balance from drying or dressing yourself while you are standing  · Keep paths clear  Remove books, shoes, and other objects from walkways and stairs  Place cords for telephones and lamps out of the way so that you do not need to walk over them  Tape them down if you cannot move them  Remove small rugs  If you cannot remove a rug, secure it with double-sided tape  This will prevent you from tripping  · Install bright lights in your home  Use night lights to help light paths to the bathroom or kitchen  Always turn on the light before you start walking  · Keep items you use often on shelves within reach  Do not use a step stool to help you reach an item  · Paint or place reflective tape on the edges of your stairs  This will help you see the stairs better  Call 911 or have someone else call if:   · You have fallen and are unconscious  · You have fallen and cannot move part of your body  Contact your healthcare provider if:   · You have fallen and have pain or a headache  · You have questions or concerns about your condition or care  CARE AGREEMENT:   You have the right to help plan your care  Learn about your health condition and how it may be treated  Discuss treatment options with your caregivers to decide what care you want to receive  You always have the right to refuse treatment  The above information is an  only  It is not intended as medical advice for individual conditions or treatments  Talk to your doctor, nurse or pharmacist before following any medical regimen to see if it is safe and effective for you  © 2017 [address] [facility] Information is for End User's use only and may not be sold, redistributed or otherwise used for commercial purposes  All illustrations and images included in CareNotes® are the copyrighted property of [employer]  or Ramesh Conley  Advance Directives   WHAT YOU NEED TO KNOW:   What are advance directives? Advance directives are legal documents that state your wishes and plans for medical care  These plans are made ahead of time in case you lose your ability to make decisions for yourself  Advance directives can apply to any medical decision, such as the treatments you want, and if you want to donate organs  What are the types of advance directives? There are many types of advance directives, and each state has rules about how to use them   You may choose a combination of any of the following:  · Living will: This is a written record of the treatment you want  You can also choose which treatments you do not want, which to limit, and which to stop at a certain time  This includes surgery, medicine, IV fluid, and tube feedings  · Durable power of  for healthcare [facility]): This is a written record that states who you want to make healthcare choices for you when you are unable to make them for yourself  This person, called a proxy, is usually a family member or a friend  You may choose more than 1 proxy  · Do not resuscitate (DNR) order:  A DNR order is used in case your heart stops beating or you stop breathing  It is a request not to have certain forms of treatment, such as CPR  A DNR order may be included in other types of advance directives  · Medical directive: This covers the care that you want if you are in a coma, near death, or unable to make decisions for yourself  You can list the treatments you want for each condition  Treatment may include pain medicine, surgery, blood transfusions, dialysis, IV or tube feedings, and a ventilator (breathing machine)  · Values history: This document has questions about your views, beliefs, and how you feel and think about life  This information can help others choose the care that you would choose  Why are advance directives important? An advance directive helps you control your care  Although spoken wishes may be used, it is better to have your wishes written down  Spoken wishes can be misunderstood, or not followed  Treatments may be given even if you do not want them  An advance directive may make it easier for your family to make difficult choices about your care  How do I decide what to put in my advance directives? · Make informed decisions:  Make sure you fully understand treatments or care you may receive   Think about the benefits and problems your decisions could cause for you or your family  Talk to healthcare providers if you have concerns or questions before you write down your wishes  You may also want to talk with your Bahai or , or a   Check your state laws to make sure that what you put in your advance directive is legal      · Sign all forms:  Sign and date your advance directive when you have finished  You may also need 2 witnesses to sign the forms  Witnesses cannot be your doctor or his staff, your spouse, heirs or beneficiaries, people you owe money to, or your chosen proxy  Talk to your family, proxy, and healthcare providers about your advance directive  Give each person a copy, and keep one for yourself in a place you can get to easily  Do not keep it hidden or locked away  · Review and revise your plans: You can revise your advance directive at any time, as long as you are able to make decisions  Review your plan every year, and when there are changes in your life, or your health  When you make changes, let your family, proxy, and healthcare providers know  Give each a new copy  Where can I find more information? · [employer]  [address] Ramon [address] , [address]  Phone: 6- 344 - [phone]  Phone: 0- 246 - [phone]  Web Address: http://www  aafp org  · [address] [facility])  [address] [facility], [address]  Phone: [phone]- [phone]  Phone: [phone]  Web Address: Shilo baron  [facility] AGREEMENT:   You have the right to help plan your care  To help with this plan, you must learn about your health condition and treatment options  You must also learn about advance directives and how they are used  Work with your healthcare providers to decide what care will be used to treat you  You always have the right to refuse treatment  The above information is an  only   It is not intended as medical advice for individual conditions or treatments  Talk to your doctor, nurse or pharmacist before following any medical regimen to see if it is safe and effective for you  © 2017 [address] [employer] Information is for End User's use only and may not be sold, redistributed or otherwise used for commercial purposes  All illustrations and images included in CareNotes® are the copyrighted property of [employer]  or Ramesh Conley

## 2019-09-07 LAB
ALBUMIN SERPL-MCNC: 4.5 G/DL (ref 3.6–4.8)
ALBUMIN/GLOB SERPL: 2.1 {RATIO} (ref 1.2–2.2)
ALP SERPL-CCNC: 77 IU/L (ref 39–117)
ALT SERPL-CCNC: 17 IU/L (ref 0–44)
APPEARANCE UR: CLEAR
AST SERPL-CCNC: 16 IU/L (ref 0–40)
BACTERIA URNS QL MICRO: NORMAL
BASOPHILS # BLD AUTO: 0 X10E3/UL (ref 0–0.2)
BASOPHILS NFR BLD AUTO: 1 %
BILIRUB SERPL-MCNC: 1 MG/DL (ref 0–1.2)
BILIRUB UR QL STRIP: NEGATIVE
BUN SERPL-MCNC: 19 MG/DL (ref 8–27)
BUN/CREAT SERPL: 15 (ref 10–24)
CALCIUM SERPL-MCNC: 9 MG/DL (ref 8.6–10.2)
CHLORIDE SERPL-SCNC: 101 MMOL/L (ref 96–106)
CHOLEST SERPL-MCNC: 133 MG/DL (ref 100–199)
CO2 SERPL-SCNC: 26 MMOL/L (ref 20–29)
COLOR UR: YELLOW
CREAT SERPL-MCNC: 1.23 MG/DL (ref 0.76–1.27)
EOSINOPHIL # BLD AUTO: 0.3 X10E3/UL (ref 0–0.4)
EOSINOPHIL NFR BLD AUTO: 5 %
EPI CELLS #/AREA URNS HPF: NORMAL /HPF (ref 0–10)
ERYTHROCYTE [DISTWIDTH] IN BLOOD BY AUTOMATED COUNT: 13.7 % (ref 12.3–15.4)
GLOBULIN SER-MCNC: 2.1 G/DL (ref 1.5–4.5)
GLUCOSE SERPL-MCNC: 100 MG/DL (ref 65–99)
GLUCOSE UR QL: NEGATIVE
HCT VFR BLD AUTO: 36.6 % (ref 37.5–51)
HDLC SERPL-MCNC: 31 MG/DL
HGB BLD-MCNC: 12.1 G/DL (ref 13–17.7)
HGB UR QL STRIP: NEGATIVE
IMM GRANULOCYTES # BLD: 0 X10E3/UL (ref 0–0.1)
IMM GRANULOCYTES NFR BLD: 0 %
KETONES UR QL STRIP: NEGATIVE
LDLC SERPL CALC-MCNC: 89 MG/DL (ref 0–99)
LEUKOCYTE ESTERASE UR QL STRIP: NEGATIVE
LYMPHOCYTES # BLD AUTO: 1.8 X10E3/UL (ref 0.7–3.1)
LYMPHOCYTES NFR BLD AUTO: 33 %
MCH RBC QN AUTO: 32.1 PG (ref 26.6–33)
MCHC RBC AUTO-ENTMCNC: 33.1 G/DL (ref 31.5–35.7)
MCV RBC AUTO: 97 FL (ref 79–97)
MICRO URNS: NORMAL
MICRO URNS: NORMAL
MICRODELETION SYND BLD/T FISH: NORMAL
MONOCYTES # BLD AUTO: 0.7 X10E3/UL (ref 0.1–0.9)
MONOCYTES NFR BLD AUTO: 13 %
NEUTROPHILS # BLD AUTO: 2.6 X10E3/UL (ref 1.4–7)
NEUTROPHILS NFR BLD AUTO: 48 %
NITRITE UR QL STRIP: NEGATIVE
PH UR STRIP: 6.5 [PH] (ref 5–7.5)
PLATELET # BLD AUTO: 201 X10E3/UL (ref 150–450)
POTASSIUM SERPL-SCNC: 4.2 MMOL/L (ref 3.5–5.2)
PROT SERPL-MCNC: 6.6 G/DL (ref 6–8.5)
PROT UR QL STRIP: NEGATIVE
PSA SERPL-MCNC: 3.1 NG/ML (ref 0–4)
RBC # BLD AUTO: 3.77 X10E6/UL (ref 4.14–5.8)
RBC #/AREA URNS HPF: NORMAL /HPF (ref 0–2)
SL AMB EGFR AFRICAN AMERICAN: 70 ML/MIN/1.73
SL AMB EGFR NON AFRICAN AMERICAN: 60 ML/MIN/1.73
SL AMB URINALYSIS REFLEX: NORMAL
SL AMB VLDL CHOLESTEROL CALC: 13 MG/DL (ref 5–40)
SODIUM SERPL-SCNC: 143 MMOL/L (ref 134–144)
SP GR UR: 1.01 (ref 1–1.03)
TRIGL SERPL-MCNC: 64 MG/DL (ref 0–149)
UROBILINOGEN UR STRIP-ACNC: 0.2 MG/DL (ref 0.2–1)
WBC # BLD AUTO: 5.3 X10E3/UL (ref 3.4–10.8)
WBC #/AREA URNS HPF: NORMAL /HPF (ref 0–5)

## 2019-09-10 ENCOUNTER — HOSPITAL ENCOUNTER (OUTPATIENT)
Dept: RADIOLOGY | Facility: HOSPITAL | Age: 69
Discharge: HOME | DRG: 726 | End: 2019-09-10
Attending: FAMILY MEDICINE
Payer: MEDICARE

## 2019-09-10 ENCOUNTER — HOSPITAL ENCOUNTER (INPATIENT)
Facility: HOSPITAL | Age: 69
LOS: 1 days | Discharge: HOME | DRG: 726 | End: 2019-09-12
Attending: EMERGENCY MEDICINE | Admitting: INTERNAL MEDICINE
Payer: MEDICARE

## 2019-09-10 DIAGNOSIS — R19.09 ABDOMINAL MASS OF OTHER SITE: ICD-10-CM

## 2019-09-10 DIAGNOSIS — R33.8 ACUTE URINARY RETENTION: Primary | ICD-10-CM

## 2019-09-10 DIAGNOSIS — R31.9 HEMATURIA: ICD-10-CM

## 2019-09-10 DIAGNOSIS — N17.9 ACUTE KIDNEY INJURY (HCC): ICD-10-CM

## 2019-09-10 PROBLEM — E87.6 HYPOKALEMIA: Status: ACTIVE | Noted: 2019-09-10

## 2019-09-10 PROBLEM — N40.0 ENLARGED PROSTATE: Status: ACTIVE | Noted: 2019-09-10

## 2019-09-10 LAB
ALBUMIN SERPL BCP-MCNC: 3.9 G/DL (ref 3.5–5)
ALP SERPL-CCNC: 81 U/L (ref 46–116)
ALT SERPL W P-5'-P-CCNC: 28 U/L (ref 12–78)
ANION GAP SERPL CALCULATED.3IONS-SCNC: 9 MMOL/L (ref 4–13)
APTT PPP: 29 SECONDS (ref 23–37)
AST SERPL W P-5'-P-CCNC: 23 U/L (ref 5–45)
BASOPHILS # BLD AUTO: 0.03 THOUSANDS/ΜL (ref 0–0.1)
BASOPHILS NFR BLD AUTO: 1 % (ref 0–1)
BILIRUB SERPL-MCNC: 1.3 MG/DL (ref 0.2–1)
BILIRUB UR QL STRIP: NEGATIVE
BUN SERPL-MCNC: 15 MG/DL (ref 5–25)
CALCIUM SERPL-MCNC: 8.1 MG/DL (ref 8.3–10.1)
CHLORIDE SERPL-SCNC: 103 MMOL/L (ref 100–108)
CLARITY UR: CLEAR
CO2 SERPL-SCNC: 29 MMOL/L (ref 21–32)
COLOR UR: NORMAL
CREAT SERPL-MCNC: 1.32 MG/DL (ref 0.6–1.3)
EOSINOPHIL # BLD AUTO: 0.22 THOUSAND/ΜL (ref 0–0.61)
EOSINOPHIL NFR BLD AUTO: 4 % (ref 0–6)
ERYTHROCYTE [DISTWIDTH] IN BLOOD BY AUTOMATED COUNT: 12.7 % (ref 11.6–15.1)
GFR SERPL CREATININE-BSD FRML MDRD: 55 ML/MIN/1.73SQ M
GLUCOSE SERPL-MCNC: 107 MG/DL (ref 65–140)
GLUCOSE UR STRIP-MCNC: NEGATIVE MG/DL
HCT VFR BLD AUTO: 36.2 % (ref 36.5–49.3)
HGB BLD-MCNC: 12.1 G/DL (ref 12–17)
HGB UR QL STRIP.AUTO: NEGATIVE
IMM GRANULOCYTES # BLD AUTO: 0.01 THOUSAND/UL (ref 0–0.2)
IMM GRANULOCYTES NFR BLD AUTO: 0 % (ref 0–2)
INR PPP: 1.02 (ref 0.91–1.09)
KETONES UR STRIP-MCNC: NEGATIVE MG/DL
LEUKOCYTE ESTERASE UR QL STRIP: NEGATIVE
LYMPHOCYTES # BLD AUTO: 1.19 THOUSANDS/ΜL (ref 0.6–4.47)
LYMPHOCYTES NFR BLD AUTO: 19 % (ref 14–44)
MCH RBC QN AUTO: 33.1 PG (ref 26.8–34.3)
MCHC RBC AUTO-ENTMCNC: 33.4 G/DL (ref 31.4–37.4)
MCV RBC AUTO: 99 FL (ref 82–98)
MONOCYTES # BLD AUTO: 0.67 THOUSAND/ΜL (ref 0.17–1.22)
MONOCYTES NFR BLD AUTO: 11 % (ref 4–12)
NEUTROPHILS # BLD AUTO: 4.06 THOUSANDS/ΜL (ref 1.85–7.62)
NEUTS SEG NFR BLD AUTO: 65 % (ref 43–75)
NITRITE UR QL STRIP: NEGATIVE
NRBC BLD AUTO-RTO: 0 /100 WBCS
NT-PROBNP SERPL-MCNC: 199 PG/ML
PH UR STRIP.AUTO: 7 [PH]
PLATELET # BLD AUTO: 204 THOUSANDS/UL (ref 149–390)
PMV BLD AUTO: 10.1 FL (ref 8.9–12.7)
POTASSIUM SERPL-SCNC: 3.3 MMOL/L (ref 3.5–5.3)
PROT SERPL-MCNC: 7.1 G/DL (ref 6.4–8.2)
PROT UR STRIP-MCNC: NEGATIVE MG/DL
PROTHROMBIN TIME: 11 SECONDS (ref 9.8–12)
RBC # BLD AUTO: 3.66 MILLION/UL (ref 3.88–5.62)
SODIUM SERPL-SCNC: 141 MMOL/L (ref 136–145)
SP GR UR STRIP.AUTO: <=1.005 (ref 1–1.03)
UROBILINOGEN UR QL STRIP.AUTO: 0.2 E.U./DL
WBC # BLD AUTO: 6.18 THOUSAND/UL (ref 4.31–10.16)

## 2019-09-10 PROCEDURE — 99284 EMERGENCY DEPT VISIT MOD MDM: CPT

## 2019-09-10 PROCEDURE — 87081 CULTURE SCREEN ONLY: CPT | Performed by: INTERNAL MEDICINE

## 2019-09-10 PROCEDURE — 81003 URINALYSIS AUTO W/O SCOPE: CPT | Performed by: EMERGENCY MEDICINE

## 2019-09-10 PROCEDURE — 83880 ASSAY OF NATRIURETIC PEPTIDE: CPT | Performed by: EMERGENCY MEDICINE

## 2019-09-10 PROCEDURE — 99220 PR INITIAL OBSERVATION CARE/DAY 70 MINUTES: CPT | Performed by: INTERNAL MEDICINE

## 2019-09-10 PROCEDURE — 85025 COMPLETE CBC W/AUTO DIFF WBC: CPT | Performed by: EMERGENCY MEDICINE

## 2019-09-10 PROCEDURE — 87086 URINE CULTURE/COLONY COUNT: CPT | Performed by: EMERGENCY MEDICINE

## 2019-09-10 PROCEDURE — 36415 COLL VENOUS BLD VENIPUNCTURE: CPT | Performed by: EMERGENCY MEDICINE

## 2019-09-10 PROCEDURE — 1124F ACP DISCUSS-NO DSCNMKR DOCD: CPT | Performed by: INTERNAL MEDICINE

## 2019-09-10 PROCEDURE — 80053 COMPREHEN METABOLIC PANEL: CPT | Performed by: EMERGENCY MEDICINE

## 2019-09-10 PROCEDURE — 74177 CT ABD & PELVIS W/CONTRAST: CPT

## 2019-09-10 PROCEDURE — 85730 THROMBOPLASTIN TIME PARTIAL: CPT | Performed by: EMERGENCY MEDICINE

## 2019-09-10 PROCEDURE — 85610 PROTHROMBIN TIME: CPT | Performed by: EMERGENCY MEDICINE

## 2019-09-10 RX ORDER — TAMSULOSIN HYDROCHLORIDE 0.4 MG/1
0.4 CAPSULE ORAL
COMMUNITY
End: 2019-12-03 | Stop reason: SDUPTHER

## 2019-09-10 RX ORDER — ONDANSETRON 2 MG/ML
4 INJECTION INTRAMUSCULAR; INTRAVENOUS EVERY 6 HOURS PRN
Status: DISCONTINUED | OUTPATIENT
Start: 2019-09-10 | End: 2019-09-12 | Stop reason: HOSPADM

## 2019-09-10 RX ORDER — FLUTICASONE PROPIONATE 50 MCG
2 SPRAY, SUSPENSION (ML) NASAL DAILY
Status: DISCONTINUED | OUTPATIENT
Start: 2019-09-11 | End: 2019-09-12 | Stop reason: HOSPADM

## 2019-09-10 RX ORDER — DOCUSATE SODIUM 100 MG/1
100 CAPSULE, LIQUID FILLED ORAL 2 TIMES DAILY
Status: DISCONTINUED | OUTPATIENT
Start: 2019-09-10 | End: 2019-09-12 | Stop reason: HOSPADM

## 2019-09-10 RX ORDER — TAMSULOSIN HYDROCHLORIDE 0.4 MG/1
0.4 CAPSULE ORAL
Status: DISCONTINUED | OUTPATIENT
Start: 2019-09-10 | End: 2019-09-12 | Stop reason: HOSPADM

## 2019-09-10 RX ORDER — SODIUM CHLORIDE 9 MG/ML
50 INJECTION, SOLUTION INTRAVENOUS CONTINUOUS
Status: DISCONTINUED | OUTPATIENT
Start: 2019-09-10 | End: 2019-09-12 | Stop reason: HOSPADM

## 2019-09-10 RX ORDER — LIDOCAINE HYDROCHLORIDE 20 MG/ML
1 JELLY TOPICAL ONCE
Status: COMPLETED | OUTPATIENT
Start: 2019-09-10 | End: 2019-09-10

## 2019-09-10 RX ORDER — ATROPA BELLADONNA AND OPIUM 16.2; 3 MG/1; MG/1
30 SUPPOSITORY RECTAL EVERY 8 HOURS PRN
Status: DISCONTINUED | OUTPATIENT
Start: 2019-09-10 | End: 2019-09-12 | Stop reason: HOSPADM

## 2019-09-10 RX ORDER — ACETAMINOPHEN 325 MG/1
650 TABLET ORAL EVERY 6 HOURS PRN
Status: DISCONTINUED | OUTPATIENT
Start: 2019-09-10 | End: 2019-09-11

## 2019-09-10 RX ORDER — POLYETHYLENE GLYCOL 3350 17 G/17G
17 POWDER, FOR SOLUTION ORAL DAILY
Status: DISCONTINUED | OUTPATIENT
Start: 2019-09-11 | End: 2019-09-12 | Stop reason: HOSPADM

## 2019-09-10 RX ORDER — POTASSIUM CHLORIDE 20 MEQ/1
40 TABLET, EXTENDED RELEASE ORAL ONCE
Status: COMPLETED | OUTPATIENT
Start: 2019-09-10 | End: 2019-09-10

## 2019-09-10 RX ADMIN — SODIUM CHLORIDE 125 ML/HR: 0.9 INJECTION, SOLUTION INTRAVENOUS at 20:50

## 2019-09-10 RX ADMIN — LIDOCAINE HYDROCHLORIDE 1 APPLICATION: 20 JELLY TOPICAL at 15:07

## 2019-09-10 RX ADMIN — POTASSIUM CHLORIDE 40 MEQ: 1500 TABLET, EXTENDED RELEASE ORAL at 17:22

## 2019-09-10 RX ADMIN — TAMSULOSIN HYDROCHLORIDE 0.4 MG: 0.4 CAPSULE ORAL at 20:53

## 2019-09-10 RX ADMIN — DOCUSATE SODIUM 100 MG: 100 CAPSULE, LIQUID FILLED ORAL at 20:52

## 2019-09-10 RX ADMIN — IOHEXOL 100 ML: 350 INJECTION, SOLUTION INTRAVENOUS at 07:55

## 2019-09-10 RX ADMIN — SODIUM CHLORIDE 125 ML/HR: 0.9 INJECTION, SOLUTION INTRAVENOUS at 17:24

## 2019-09-10 NOTE — ED PROVIDER NOTES
History  Chief Complaint   Patient presents with    Urinary Retention     Patient states he feels like he has not been emptying his bladder for 2-3 months     72-year-old male states he feels like he has not been emptying his bladder for the last 2-3 months  Went to his PCP order CT the abdomen pelvis today which found distended bladder and bilateral hydro ureteral nephrosis  Patient denies any pain with this states he occasionally does urinate quite a bit of urine throughout the day so does not understand why his bladder does not empty  Patient states he is also very thirsty  History of diabetes in the family      History provided by:  Patient   used: No        Prior to Admission Medications   Prescriptions Last Dose Informant Patient Reported? Taking? clotrimazole-betamethasone (LOTRISONE) 1-0 05 % cream   No No   Sig: Apply topically 2 (two) times a day Right flank area  As instructed   fluticasone (FLONASE) 50 mcg/act nasal spray  Self Yes No   Si puffs into each nostril daily      Facility-Administered Medications: None       Past Medical History:   Diagnosis Date    Enlarged prostate        Past Surgical History:   Procedure Laterality Date    HERNIA REPAIR         Family History   Problem Relation Age of Onset    Diabetes Mother     Heart failure Father      I have reviewed and agree with the history as documented  Social History     Tobacco Use    Smoking status: Former Smoker     Last attempt to quit:      Years since quittin 6    Smokeless tobacco: Never Used   Substance Use Topics    Alcohol use: Never     Frequency: Never    Drug use: Never        Review of Systems   Constitutional: Negative for activity change, chills, diaphoresis and fever  HENT: Negative for congestion, ear pain, nosebleeds, sore throat, trouble swallowing and voice change  Eyes: Negative for pain, discharge and redness     Respiratory: Negative for apnea, cough, choking, shortness of breath, wheezing and stridor  Cardiovascular: Negative for chest pain and palpitations  Gastrointestinal: Positive for abdominal distention and abdominal pain  Negative for constipation, diarrhea, nausea and vomiting  Endocrine: Negative for polydipsia  Genitourinary: Negative for difficulty urinating, dysuria, flank pain, frequency, hematuria and urgency  Musculoskeletal: Negative for back pain, gait problem, joint swelling, myalgias, neck pain and neck stiffness  Skin: Negative for pallor and rash  Neurological: Negative for dizziness, tremors, syncope, speech difficulty, weakness, numbness and headaches  Hematological: Negative for adenopathy  Psychiatric/Behavioral: Negative for confusion, hallucinations, self-injury and suicidal ideas  The patient is not nervous/anxious  Physical Exam  Physical Exam   Constitutional: He is oriented to person, place, and time  Vital signs are normal  He appears well-developed and well-nourished  No distress  HENT:   Head: Normocephalic and atraumatic  Right Ear: External ear normal    Left Ear: External ear normal    Nose: Nose normal    Mouth/Throat: Oropharynx is clear and moist    Eyes: Pupils are equal, round, and reactive to light  Conjunctivae are normal    Neck: Normal range of motion  Neck supple  Cardiovascular: Normal rate, regular rhythm, normal heart sounds and intact distal pulses  Pulmonary/Chest: Effort normal and breath sounds normal    Abdominal: Soft  Bowel sounds are normal  He exhibits distension  There is tenderness  Musculoskeletal: Normal range of motion  Neurological: He is alert and oriented to person, place, and time  He has normal reflexes  Skin: Skin is warm and dry  He is not diaphoretic  Psychiatric: He has a normal mood and affect  Nursing note and vitals reviewed        Vital Signs  ED Triage Vitals [09/10/19 1424]   Temperature Pulse Respirations Blood Pressure SpO2   99 6 °F (37 6 °C) 93 16 (!) 158/106 96 %      Temp Source Heart Rate Source Patient Position - Orthostatic VS BP Location FiO2 (%)   Tympanic Monitor Sitting Right arm --      Pain Score       6           Vitals:    09/10/19 1424 09/10/19 1548   BP: (!) 158/106 145/90   Pulse: 93 80   Patient Position - Orthostatic VS: Sitting Sitting         Visual Acuity      ED Medications  Medications   potassium chloride (K-DUR,KLOR-CON) CR tablet 40 mEq (has no administration in time range)   lidocaine (URO-JET) 2 % urethral/mucosal gel 1 application (1 application Urethral Given 9/10/19 1507)       Diagnostic Studies  Results Reviewed     Procedure Component Value Units Date/Time    UA w Reflex to Microscopic [[id number]] Collected:  09/10/19 1526    Lab Status:  Final result Specimen:  Urine, Indwelling Griffin Catheter Updated:  09/10/19 1541     Color, UA Light Yellow     Clarity, UA Clear     Specific Gravity, UA <=1 005     pH, UA 7 0     Leukocytes, UA Negative     Nitrite, UA Negative     Protein, UA Negative mg/dl      Glucose, UA Negative mg/dl      Ketones, UA Negative mg/dl      Urobilinogen, UA 0 2 E U /dl      Bilirubin, UA Negative     Blood, UA Negative    Urine culture [[id number]] Collected:  09/10/19 1527    Lab Status:   In process Specimen:  Urine, Clean Catch Updated:  09/10/19 1538    B-type natriuretic peptide [[id number]]  (Abnormal) Collected:  09/10/19 1450    Lab Status:  Final result Specimen:  Blood from Arm, Right Updated:  09/10/19 1519     NT-proBNP 199 pg/mL     Comprehensive metabolic panel [[id number]]  (Abnormal) Collected:  09/10/19 1450    Lab Status:  Final result Specimen:  Blood from Arm, Right Updated:  09/10/19 1513     Sodium 141 mmol/L      Potassium 3 3 mmol/L      Chloride 103 mmol/L      CO2 29 mmol/L      ANION GAP 9 mmol/L      BUN 15 mg/dL      Creatinine 1 32 mg/dL      Glucose 107 mg/dL      Calcium 8 1 mg/dL      AST 23 U/L      ALT 28 U/L      Alkaline Phosphatase 81 U/L      Total Protein 7 1 g/dL Albumin 3 9 g/dL      Total Bilirubin 1 30 mg/dL      eGFR 55 ml/min/1 73sq m     Narrative:       National Kidney Disease Foundation guidelines for Chronic Kidney Disease (CKD):     Stage 1 with normal or high GFR (GFR > 90 mL/min/1 73 square meters)    Stage 2 Mild CKD (GFR = 60-89 mL/min/1 73 square meters)    Stage 3A Moderate CKD (GFR = 45-59 mL/min/1 73 square meters)    Stage 3B Moderate CKD (GFR = 30-44 mL/min/1 73 square meters)    Stage 4 Severe CKD (GFR = 15-29 mL/min/1 73 square meters)    Stage 5 End Stage CKD (GFR <15 mL/min/1 73 square meters)  Note: GFR calculation is accurate only with a steady state creatinine    Protime-INR [[id number]]  (Normal) Collected:  09/10/19 1450    Lab Status:  Final result Specimen:  Blood from Arm, Right Updated:  09/10/19 1510     Protime 11 0 seconds      INR 1 02    APTT [[id number]]  (Normal) Collected:  09/10/19 1450    Lab Status:  Final result Specimen:  Blood from Arm, Right Updated:  09/10/19 1510     PTT 29 seconds     CBC and differential [[id number]]  (Abnormal) Collected:  09/10/19 1450    Lab Status:  Final result Specimen:  Blood from Arm, Right Updated:  09/10/19 1456     WBC 6 18 Thousand/uL      RBC 3 66 Million/uL      Hemoglobin 12 1 g/dL      Hematocrit 36 2 %      MCV 99 fL      MCH 33 1 pg      MCHC 33 4 g/dL      RDW 12 7 %      MPV 10 1 fL      Platelets 288 Thousands/uL      nRBC 0 /100 WBCs      Neutrophils Relative 65 %      Immat GRANS % 0 %      Lymphocytes Relative 19 %      Monocytes Relative 11 %      Eosinophils Relative 4 %      Basophils Relative 1 %      Neutrophils Absolute 4 06 Thousands/µL      Immature Grans Absolute 0 01 Thousand/uL      Lymphocytes Absolute 1 19 Thousands/µL      Monocytes Absolute 0 67 Thousand/µL      Eosinophils Absolute 0 22 Thousand/µL      Basophils Absolute 0 03 Thousands/µL                  No orders to display              Procedures  Procedures       ED Course MDM  Number of Diagnoses or Management Options  Acute urinary retention:   Diagnosis management comments: Patient has collected approximately 3800 cc of urine in his Griffin since it was placed and now he is starting to have some pink-tinged urine come from the Griffin  I will have the hospitalist admit the patient overnight for consult with Dr Fallon Chahal in the a m  Laurie Jackson Disposition  Final diagnoses:   Acute urinary retention     Time reflects when diagnosis was documented in both MDM as applicable and the Disposition within this note     Time User Action Codes Description Comment    9/10/2019  5:00 PM Julio [address]esar St Add [R33 8] Acute urinary retention       ED Disposition     ED Disposition Condition Date/Time Comment    Admit Stable Tue Sep 10, 2019  5:00 PM Case was discussed with Dr Richard Mckay and the patient's admission status was agreed to be Admission Status: observation status to the service of Dr Richard Mckay   Follow-up Information    None         Patient's Medications   Discharge Prescriptions    No medications on file     No discharge procedures on file      ED Provider  Electronically Signed by           Mica Balderas DO  09/10/19 1744

## 2019-09-10 NOTE — ASSESSMENT & PLAN NOTE
Acute urinary retention present on admission, evidence by CT of abdomen pelvis which demonstrated marked distention of bladder with diffuse trabeculation  Urology consulted, placed Griffin catheter emergency department  Griffin catheter returned 3800 cc of pink tinged urine  Will maintain Griffin catheter  Patient reports that he is having some bladder spasms  Belladonna suppository ordered for patient  Pain control  Monitor output  Monitor urine for signs of clots

## 2019-09-10 NOTE — H&P
H&P- Tre Hussein 1950, 81 y o  male MRN: [id number]    Unit/Bed#: [facility] Encounter: [id number]    Primary Care Provider: Tyrel Del Toro DO   Date and time admitted to hospital: 9/10/2019  2:28 PM        * Acute urinary retention  Assessment & Plan  Acute urinary retention present on admission, evidence by CT of abdomen pelvis which demonstrated marked distention of bladder with diffuse trabeculation  Urology consulted, placed Griffin catheter emergency department  Griffin catheter returned 3800 cc of pink tinged urine  Will maintain Griffin catheter  Patient reports that he is having some bladder spasms  Belladonna suppository ordered for patient  Pain control  Monitor output  Monitor urine for signs of clots  Enlarged prostate  Assessment & Plan  Patient has a history of enlarged prostate, has been taking Flomax 0 4 mg p  O  Daily as prescribed by his primary care physician  Will continue  Urology consulted  Acute kidney injury [facility])  Assessment & Plan  Acute kidney injury present on admission evidence by creatinine 1 32, most recent creatinine 1 23  Most likely post renal secondary to urinary outflow blockage  Griffin catheter placed by Urology  Repeat BMP in a m   IV hydration normal saline at 125 cc an hour  Avoid nephrotoxin agents  Hypokalemia  Assessment & Plan  Hypokalemia present on admission evidence by potassium 3 3  Repleted with oral potassium in emergency department  Repeat BMP in a m  VTE Prophylaxis: Enoxaparin (Lovenox)  / sequential compression device   Code Status:  Full code discussed with patient  POLST: POLST is not applicable to this patient  Discussion with family:  I spoke with patient at the bedside, I have answered all questions to the best of my abilities  Anticipated Length of Stay:  Patient will be admitted on an Observation basis with an anticipated length of stay of  less than 2 midnights     Justification for Hospital Stay:  Insertion of Griffin catheter for urinary retention, Urology consult, IV hydration for acute kidney injury  Total Time for Visit, including Counseling / Coordination of Care: 1 hour  Greater than 50% of this total time spent on direct patient counseling and coordination of care  Chief Complaint:   Difficulty urinating  History of Present Illness:    Jossue Garcias is a 81 y o  male the past medical history of enlarged prostate, melanoma his left shoulder remove 7 years ago, and surgical repair of bilateral inguinal hernias who presents with difficulty urinating  Patient reports that for the past few months he has been experiencing difficulty urinating and has been experiencing a distended abdomen  He had been seen by his primary care physician in the past for similar complaints, and was placed on Flomax  On day of admission patient reported that he was having difficulty urinating and presented to the emergency department for further evaluation and treatment  CT of abdomen and pelvis was performed which showed mild bilateral hydronephrosis, colonic diverticulosis, moderate prostatomegaly without any significant change from previous imaging, markedly distension of bladder with diffuse trabeculation  There is also noted to be 1 5 cm umbilical abdominal wall defect with a small fat containing hernia  It also demonstrated a moderate chronic compression fracture of L1 and superior endplate L5 which was new since March of 2017  Urology was consulted and a Griffin catheter was placed with the return of 3800 cc of pink tinged urine  Patient's creatinine was also noted to be 1 32, most recent creatinine was 1 23  Patient was also noted to have hypokalemia with a potassium of 3 3  He received 1 L normal saline bolus in the emergency department  Decision was made to admit the patient on observation basis for further management including IV hydration overnight and urology consultation    This was discussed with the patient at the bedside, he verbalized understanding and is agreeable to the plan       Review of Systems:    Review of Systems   Constitutional: Negative for activity change, appetite change, chills and fever  HENT: Negative  Eyes: Negative  Respiratory: Negative for cough and shortness of breath  Cardiovascular: Negative for chest pain and leg swelling  Gastrointestinal: Positive for abdominal distention  Negative for constipation, diarrhea, nausea and vomiting  Endocrine: Negative  Genitourinary: Positive for difficulty urinating  Negative for hematuria  Musculoskeletal: Negative  Allergic/Immunologic: Negative  Neurological: Negative for dizziness, weakness, light-headedness and headaches  Hematological: Negative  Psychiatric/Behavioral: Negative  Past Medical and Surgical History:     Past Medical History:   Diagnosis Date    Enlarged prostate        Past Surgical History:   Procedure Laterality Date    HERNIA REPAIR         Meds/Allergies:    Prior to Admission medications    Medication Sig Start Date End Date Taking? Authorizing Provider   clotrimazole-betamethasone (LOTRISONE) 1-0 05 % cream Apply topically 2 (two) times a day Right flank area  As instructed 6/24/19   Su Lu,    fluticasone Meridee Fails) 50 mcg/act nasal spray 2 puffs into each nostril daily 3/7/17   Historical Provider, MD SANCHEZ have reviewed home medications with patient personally  Allergies: Allergies   Allergen Reactions    Aspirin GI Intolerance     Other reaction(s): GI upset  Reaction Date: [id number]; Other reaction(s): gi complications  Reaction Date: [id number];        Social History:     Marital Status:    Occupation:  Wood touch up artist  Patient Pre-hospital Living Situation:  Home alone  Patient Pre-hospital Level of Mobility:  Independent without ambulatory aids  Patient Pre-hospital Diet Restrictions:  None    Substance Use History:   Social History     Substance and Sexual Activity   Alcohol Use Never    Frequency: Never     Social History     Tobacco Use   Smoking Status Former Smoker    Last attempt to quit:     Years since quittin 6   Smokeless Tobacco Never Used     Social History     Substance and Sexual Activity   Drug Use Never       Family History:    Family History   Problem Relation Age of Onset    Diabetes Mother     Heart failure Father        Physical Exam:     Vitals:   Blood Pressure: 145/90 (09/10/19 1548)  Pulse: 80 (09/10/19 1548)  Temperature: 99 6 °F (37 6 °C) (09/10/19 142)  Temp Source: Tympanic (09/10/19 1424)  Respirations: 16 (09/10/19 1548)  Height: 6' 1" (185 4 cm) (09/10/19 1424)  Weight - Scale: 95 1 kg (209 lb 10 5 oz) (09/10/19 1424)  SpO2: 96 % (09/10/19 154)    Physical Exam   Constitutional: He is oriented to person, place, and time  He appears well-developed and well-nourished  No distress  HENT:   Head: Normocephalic  Eyes: Pupils are equal, round, and reactive to light  Conjunctivae and EOM are normal    Neck: Normal range of motion  Cardiovascular: Normal rate, regular rhythm and normal heart sounds  Pulmonary/Chest: Effort normal and breath sounds normal  No respiratory distress  Abdominal: Soft  Bowel sounds are normal  He exhibits no distension  There is no tenderness  Genitourinary:   Genitourinary Comments: Griffin catheter present, draining pink tinged urine large amounts  Musculoskeletal: Normal range of motion  He exhibits no edema  Neurological: He is alert and oriented to person, place, and time  Skin: Skin is warm and dry  Capillary refill takes less than 2 seconds  Psychiatric: He has a normal mood and affect  His behavior is normal  Judgment and thought content normal              Additional Data:     Lab Results: I have personally reviewed pertinent reports        Results from last 7 days   Lab Units 09/10/19  1450   WBC Thousand/uL 6 18   HEMOGLOBIN g/dL 12 1   HEMATOCRIT % 36 2*   PLATELETS Thousands/uL 204   NEUTROS PCT % 65   LYMPHS PCT % 19   MONOS PCT % 11   EOS PCT % 4     Results from last 7 days   Lab Units 09/10/19  1450   POTASSIUM mmol/L 3 3*   CHLORIDE mmol/L 103   CO2 mmol/L 29   BUN mg/dL 15   CREATININE mg/dL 1 32*   CALCIUM mg/dL 8 1*   ALK PHOS U/L 81   ALT U/L 28   AST U/L 23     Results from last 7 days   Lab Units 09/10/19  1450   INR  1 02               Imaging: I have personally reviewed pertinent reports  No orders to display       EKG, Pathology, and Other Studies Reviewed on Admission:   · EKG:  None  Allscripts / Epic Records Reviewed: Yes     ** Please Note: This note has been constructed using a voice recognition system   **

## 2019-09-10 NOTE — ASSESSMENT & PLAN NOTE
Acute kidney injury present on admission evidence by creatinine 1 32, most recent creatinine 1 23  Most likely post renal secondary to urinary outflow blockage  Griffin catheter placed by Urology  Repeat BMP in a m   IV hydration normal saline at 125 cc an hour  Avoid nephrotoxin agents

## 2019-09-10 NOTE — ASSESSMENT & PLAN NOTE
Hypokalemia present on admission evidence by potassium 3 3  Repleted with oral potassium in emergency department  Repeat BMP in david Rios

## 2019-09-10 NOTE — ED NOTES
Patient ambulated around nurses station  Denies pain with ambulation  Urine to drainage back now pink tinged  MD aware        Alonso Kirby RN  09/10/19 6124

## 2019-09-10 NOTE — ASSESSMENT & PLAN NOTE
Patient has a history of enlarged prostate, has been taking Flomax 0 4 mg p  O  Daily as prescribed by his primary care physician  Will continue  Urology consulted

## 2019-09-11 PROBLEM — N13.30 HYDRONEPHROSIS: Status: ACTIVE | Noted: 2019-09-11

## 2019-09-11 LAB
ALBUMIN SERPL BCP-MCNC: 3.5 G/DL (ref 3.5–5)
ALP SERPL-CCNC: 67 U/L (ref 46–116)
ALT SERPL W P-5'-P-CCNC: 21 U/L (ref 12–78)
ANION GAP SERPL CALCULATED.3IONS-SCNC: 8 MMOL/L (ref 4–13)
AST SERPL W P-5'-P-CCNC: 14 U/L (ref 5–45)
BACTERIA UR CULT: NORMAL
BASOPHILS # BLD AUTO: 0.02 THOUSANDS/ΜL (ref 0–0.1)
BASOPHILS NFR BLD AUTO: 0 % (ref 0–1)
BILIRUB DIRECT SERPL-MCNC: 0.2 MG/DL (ref 0–0.2)
BILIRUB SERPL-MCNC: 1.2 MG/DL (ref 0.2–1)
BUN SERPL-MCNC: 15 MG/DL (ref 5–25)
CALCIUM SERPL-MCNC: 8.1 MG/DL (ref 8.3–10.1)
CHLORIDE SERPL-SCNC: 103 MMOL/L (ref 100–108)
CO2 SERPL-SCNC: 28 MMOL/L (ref 21–32)
CREAT SERPL-MCNC: 1.2 MG/DL (ref 0.6–1.3)
EOSINOPHIL # BLD AUTO: 0.08 THOUSAND/ΜL (ref 0–0.61)
EOSINOPHIL NFR BLD AUTO: 1 % (ref 0–6)
ERYTHROCYTE [DISTWIDTH] IN BLOOD BY AUTOMATED COUNT: 12.7 % (ref 11.6–15.1)
GFR SERPL CREATININE-BSD FRML MDRD: 62 ML/MIN/1.73SQ M
GLUCOSE SERPL-MCNC: 107 MG/DL (ref 65–140)
HCT VFR BLD AUTO: 35.6 % (ref 36.5–49.3)
HGB BLD-MCNC: 11.7 G/DL (ref 12–17)
IMM GRANULOCYTES # BLD AUTO: 0.02 THOUSAND/UL (ref 0–0.2)
IMM GRANULOCYTES NFR BLD AUTO: 0 % (ref 0–2)
LYMPHOCYTES # BLD AUTO: 1.33 THOUSANDS/ΜL (ref 0.6–4.47)
LYMPHOCYTES NFR BLD AUTO: 17 % (ref 14–44)
MAGNESIUM SERPL-MCNC: 1.8 MG/DL (ref 1.6–2.6)
MCH RBC QN AUTO: 32.1 PG (ref 26.8–34.3)
MCHC RBC AUTO-ENTMCNC: 32.9 G/DL (ref 31.4–37.4)
MCV RBC AUTO: 98 FL (ref 82–98)
MONOCYTES # BLD AUTO: 0.64 THOUSAND/ΜL (ref 0.17–1.22)
MONOCYTES NFR BLD AUTO: 8 % (ref 4–12)
NEUTROPHILS # BLD AUTO: 5.58 THOUSANDS/ΜL (ref 1.85–7.62)
NEUTS SEG NFR BLD AUTO: 74 % (ref 43–75)
NRBC BLD AUTO-RTO: 0 /100 WBCS
PLATELET # BLD AUTO: 200 THOUSANDS/UL (ref 149–390)
PMV BLD AUTO: 10 FL (ref 8.9–12.7)
POTASSIUM SERPL-SCNC: 3.3 MMOL/L (ref 3.5–5.3)
PROT SERPL-MCNC: 6.6 G/DL (ref 6.4–8.2)
RBC # BLD AUTO: 3.64 MILLION/UL (ref 3.88–5.62)
SODIUM SERPL-SCNC: 139 MMOL/L (ref 136–145)
TSH SERPL DL<=0.05 MIU/L-ACNC: 1.34 UIU/ML (ref 0.36–3.74)
VIT B12 SERPL-MCNC: 332 PG/ML (ref 100–900)
WBC # BLD AUTO: 7.67 THOUSAND/UL (ref 4.31–10.16)

## 2019-09-11 PROCEDURE — 83735 ASSAY OF MAGNESIUM: CPT | Performed by: NURSE PRACTITIONER

## 2019-09-11 PROCEDURE — 0T9B70Z DRAINAGE OF BLADDER WITH DRAINAGE DEVICE, VIA NATURAL OR ARTIFICIAL OPENING: ICD-10-PCS | Performed by: EMERGENCY MEDICINE

## 2019-09-11 PROCEDURE — 84443 ASSAY THYROID STIM HORMONE: CPT | Performed by: INTERNAL MEDICINE

## 2019-09-11 PROCEDURE — 80076 HEPATIC FUNCTION PANEL: CPT | Performed by: INTERNAL MEDICINE

## 2019-09-11 PROCEDURE — 99232 SBSQ HOSP IP/OBS MODERATE 35: CPT | Performed by: INTERNAL MEDICINE

## 2019-09-11 PROCEDURE — 82607 VITAMIN B-12: CPT | Performed by: INTERNAL MEDICINE

## 2019-09-11 PROCEDURE — 80048 BASIC METABOLIC PNL TOTAL CA: CPT | Performed by: NURSE PRACTITIONER

## 2019-09-11 PROCEDURE — 85025 COMPLETE CBC W/AUTO DIFF WBC: CPT | Performed by: NURSE PRACTITIONER

## 2019-09-11 RX ORDER — ACETAMINOPHEN 325 MG/1
650 TABLET ORAL EVERY 6 HOURS PRN
Status: DISCONTINUED | OUTPATIENT
Start: 2019-09-11 | End: 2019-09-11

## 2019-09-11 RX ORDER — POTASSIUM CHLORIDE 20 MEQ/1
40 TABLET, EXTENDED RELEASE ORAL
Status: COMPLETED | OUTPATIENT
Start: 2019-09-11 | End: 2019-09-11

## 2019-09-11 RX ORDER — ACETAMINOPHEN 325 MG/1
650 TABLET ORAL EVERY 6 HOURS PRN
Status: DISCONTINUED | OUTPATIENT
Start: 2019-09-11 | End: 2019-09-12 | Stop reason: HOSPADM

## 2019-09-11 RX ORDER — BUTALBITAL, ACETAMINOPHEN AND CAFFEINE 50; 325; 40 MG/1; MG/1; MG/1
1 TABLET ORAL ONCE
Status: COMPLETED | OUTPATIENT
Start: 2019-09-11 | End: 2019-09-11

## 2019-09-11 RX ADMIN — FLUTICASONE PROPIONATE 2 SPRAY: 50 SPRAY, METERED NASAL at 09:11

## 2019-09-11 RX ADMIN — POTASSIUM CHLORIDE 40 MEQ: 1500 TABLET, EXTENDED RELEASE ORAL at 09:14

## 2019-09-11 RX ADMIN — DOCUSATE SODIUM 100 MG: 100 CAPSULE, LIQUID FILLED ORAL at 09:10

## 2019-09-11 RX ADMIN — POLYETHYLENE GLYCOL 3350 17 G: 17 POWDER, FOR SOLUTION ORAL at 09:10

## 2019-09-11 RX ADMIN — ENOXAPARIN SODIUM 40 MG: 40 INJECTION SUBCUTANEOUS at 09:10

## 2019-09-11 RX ADMIN — POTASSIUM CHLORIDE 40 MEQ: 1500 TABLET, EXTENDED RELEASE ORAL at 11:46

## 2019-09-11 RX ADMIN — DOCUSATE SODIUM 100 MG: 100 CAPSULE, LIQUID FILLED ORAL at 17:11

## 2019-09-11 RX ADMIN — SODIUM CHLORIDE 125 ML/HR: 0.9 INJECTION, SOLUTION INTRAVENOUS at 05:12

## 2019-09-11 RX ADMIN — TAMSULOSIN HYDROCHLORIDE 0.4 MG: 0.4 CAPSULE ORAL at 17:11

## 2019-09-11 RX ADMIN — BUTALBITAL, ACETAMINOPHEN AND CAFFEINE 1 TABLET: 50; 325; 40 TABLET ORAL at 14:53

## 2019-09-11 RX ADMIN — ACETAMINOPHEN 650 MG: 325 TABLET, FILM COATED ORAL at 02:25

## 2019-09-11 NOTE — PLAN OF CARE
Problem: PAIN - ADULT  Goal: Verbalizes/displays adequate comfort level or baseline comfort level  Description  Interventions:  - Encourage patient to monitor pain and request assistance  - Assess pain using appropriate pain scale  - Administer analgesics based on type and severity of pain and evaluate response  - Implement non-pharmacological measures as appropriate and evaluate response  - Consider cultural and social influences on pain and pain management  - Notify physician/advanced practitioner if interventions unsuccessful or patient reports new pain  Outcome: Progressing     Problem: INFECTION - ADULT  Goal: Absence or prevention of progression during hospitalization  Description  INTERVENTIONS:  - Assess and monitor for signs and symptoms of infection  - Monitor lab/diagnostic results  - Monitor all insertion sites, i e  indwelling lines, tubes, and drains  - [place] appropriate cooling/warming therapies per order  - Administer medications as ordered  - Instruct and encourage patient and family to use good hand hygiene technique  - Identify and instruct in appropriate isolation precautions for identified infection/condition   Outcome: Progressing     Problem: GENITOURINARY - ADULT  Goal: Maintains or returns to baseline urinary function  Description  INTERVENTIONS:  - Assess urinary function  - Encourage oral fluids to ensure adequate hydration if ordered  - Administer IV fluids as ordered to ensure adequate hydration  - Administer ordered medications as needed  - Offer frequent toileting  - Follow urinary retention protocol if ordered  Outcome: Progressing  Goal: Absence of urinary retention  Description  INTERVENTIONS:  - Assess patient's ability to void and empty bladder  - Monitor I/O  - Bladder scan as needed  - Discuss with physician/AP medications to alleviate retention as needed  - Discuss catheterization for long term situations as appropriate   Outcome: Progressing  Goal: Urinary catheter remains patent  Description  INTERVENTIONS:  - Assess patency of urinary catheter  - If patient has a chronic brown, consider changing catheter if non-functioning  - Follow guidelines for intermittent irrigation of non-functioning urinary catheter  Outcome: Progressing

## 2019-09-11 NOTE — PROGRESS NOTES
Progress Note - Trina Dao 1950, 81 y o  male MRN: [id number]    Unit/Bed#: [address] Encounter: [id number]    Primary Care Provider: Glo Nazario DO   Date and time admitted to hospital: 9/10/2019  2:28 PM        * Acute urinary retention  Assessment & Plan  Acute urinary retention present on admission, evidence by CT of abdomen pelvis which demonstrated marked distention of bladder with diffuse trabeculation  Urology consulted, placed Griffin catheter emergency department  Griffin catheter returned 3800 cc of pink tinged urine  Continue Flomax  Discontinue Lovenox in view of pinkish urine  Urology following, appreciate input  Acute kidney injury [facility])  Assessment & Plan  Acute kidney injury present on admission evidence by creatinine 1 32, most recent creatinine 1 23  Most likely post renal secondary to urinary outflow blockage  Creatinine 1 2 this morning  Grififn catheter placed by Urology  Decrease IV hydration normal saline to 50 ml/hr  Avoid nephrotoxin agents  Monitor BMP  Hydronephrosis  Assessment & Plan  CT scan showed 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 secondary to chronic partial bladder outlet obstruction given diffuse bladder wall trabeculation and moderate prostatomegaly  Continue Griffin  Urology following, appreciate input  Hypokalemia  Assessment & Plan  Replete  Repeat lab in a m     Enlarged prostate  Assessment & Plan  Patient has a history of enlarged prostate, has been taking Flomax 0 4 mg p  O  Daily as prescribed by his primary care physician  Will continue  Urology consulted  VTE Pharmacologic Prophylaxis:   Pharmacologic: Pharmacologic VTE Prophylaxis contraindicated due to pinkish urine  Mechanical VTE Prophylaxis in Place: Yes    Patient Centered Rounds: I have performed bedside rounds with nursing staff today      Discussions with Specialists or Other Care Team Provider: yes    Education and Discussions with Family / Patient: yes    Time Spent for Care: 20 minutes  More than 50% of total time spent on counseling and coordination of care as described above  Current Length of Stay: 0 day(s)    Current Patient Status: Observation   Certification Statement: The patient will continue to require additional inpatient hospital stay due to acute urinary retention    Discharge Plan: home once medically cleared    Code Status: Level 1 - Full Code      Subjective:   Patient reports discomfort in urethral   Reports constipation  Denies chest pain, headaches, dizziness, SOB, nausea, vomiting, fever or chills  Objective:     Vitals:   Temp (24hrs), Av 3 °F (36 8 °C), Min:97 7 °F (36 5 °C), Max:99 6 °F (37 6 °C)    Temp:  [97 7 °F (36 5 °C)-99 6 °F (37 6 °C)] 97 7 °F (36 5 °C)  HR:  [70-93] 74  Resp:  [16-18] 18  BP: (124-158)/() 138/91  SpO2:  [93 %-98 %] 98 %  Body mass index is 27 66 kg/m²  Input and Output Summary (last 24 hours): Intake/Output Summary (Last 24 hours) at 2019 1041  Last data filed at 2019 9740  Gross per 24 hour   Intake --   Output 9525 ml   Net -9525 ml       Physical Exam:     Physical Exam   Constitutional: He is oriented to person, place, and time  He appears well-developed and well-nourished  HENT:   Head: Normocephalic and atraumatic  Neck: Normal range of motion  Neck supple  No JVD present  No tracheal deviation present  No thyromegaly present  Cardiovascular: Normal rate, regular rhythm, normal heart sounds and intact distal pulses  Pulmonary/Chest: Effort normal and breath sounds normal  No respiratory distress  He has no wheezes  He has no rales  Abdominal: Soft  Bowel sounds are normal  He exhibits no distension  There is no tenderness  There is no guarding  Genitourinary:   Genitourinary Comments: Griffin draining pinkish urine  Musculoskeletal: He exhibits no edema, tenderness or deformity  Neurological: He is alert and oriented to person, place, and time     Skin: Skin is warm and dry  Psychiatric: He has a normal mood and affect  Judgment normal    Nursing note and vitals reviewed  Additional Data:     Labs:    Results from last 7 days   Lab Units 09/11/19  0531   WBC Thousand/uL 7 67   HEMOGLOBIN g/dL 11 7*   HEMATOCRIT % 35 6*   PLATELETS Thousands/uL 200   NEUTROS PCT % 74   LYMPHS PCT % 17   MONOS PCT % 8   EOS PCT % 1     Results from last 7 days   Lab Units 09/11/19  0531   POTASSIUM mmol/L 3 3*   CHLORIDE mmol/L 103   CO2 mmol/L 28   BUN mg/dL 15   CREATININE mg/dL 1 20   CALCIUM mg/dL 8 1*   ALK PHOS U/L 67   ALT U/L 21   AST U/L 14     Results from last 7 days   Lab Units 09/10/19  1450   INR  1 02       * I Have Reviewed All Lab Data Listed Above  * Additional Pertinent Lab Tests Reviewed: Juan 66 Admission Reviewed    Imaging:    Imaging Reports Reviewed Today Include:  None  Imaging Personally Reviewed by Myself Includes:  None    Recent Cultures (last 7 days):           Last 24 Hours Medication List:     Current Facility-Administered Medications:  acetaminophen 650 mg Oral Q6H PRN Thermon Crane, DAVISNP    belladonna-opium 30 mg Rectal Q8H PRN Rosezetta Pipe, CRNP    docusate sodium 100 mg Oral BID Rosezetta Pipe, CRNP    fluticasone 2 spray Nasal Daily Rosezetta Pipe, CRNP    morphine injection 2 mg Intravenous Q4H PRN Rosezetta Pipe, CRNP    ondansetron 4 mg Intravenous Q6H PRN Rosezetta Pipe, CRNP    polyethylene glycol 17 g Oral Daily Rosezetta Pipe, CRNP    potassium chloride 40 mEq Oral TID With Meals HARPER Lockwood    sodium chloride 50 mL/hr Intravenous Continuous Cuiyin Deenarik, DAVISNP Last Rate: 50 mL/hr (09/11/19 0911)   tamsulosin 0 4 mg [facility]         Today, Patient Was Seen By: HARPER Pandya    ** Please Note: Dragon 360 Dictation voice to text software may have been used in the creation of this document   **

## 2019-09-11 NOTE — ASSESSMENT & PLAN NOTE
Acute kidney injury present on admission evidence by creatinine 1 32, most recent creatinine 1 23  Most likely post renal secondary to urinary outflow blockage  Creatinine 1 2 this morning  Griffin catheter placed by Urology  Decrease IV hydration normal saline to 50 ml/hr  Avoid nephrotoxin agents  Monitor BMP

## 2019-09-11 NOTE — ASSESSMENT & PLAN NOTE
Patient has a history of enlarged prostate, has been taking Flomax 0 4 mg p  O  Daily as prescribed by his primary care physician  Will continue    Follow-up urology as outpatient

## 2019-09-11 NOTE — ASSESSMENT & PLAN NOTE
Acute kidney injury present on admission evidence by creatinine 1 32, most recent creatinine 1 23  Most likely post renal secondary to urinary outflow blockage  Creatinine normalized  Griffin catheter placed by Urology  Discharge patient home today  Repeat BMP in 4 days

## 2019-09-11 NOTE — ASSESSMENT & PLAN NOTE
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

## 2019-09-11 NOTE — CONSULTS
Consultation - [facility]  Kendall Douglass 81 y o  male MRN: [id number]  Unit/Bed#: [address] Encounter: [id number]    Requesting Physician: Amanda Bradford MD    Assessment/Plan:    Urinary retention   B/L hydronephroureterosis and marked distention of the bladder noted on CT 09/10/19  Most likely having urinary retention issues for the past 6-12months  Brown placed 09/10/19 for 3800cc of pink tinged urine   Subsequent post diuresis hematuria   ADRIENNE   Creatinine was 1 32 on admission 09/10/19  Brown subsequently placed and creatinine then improved to 1 20 on repeat labs today 09/11/19  Long standing hx of BPH on tamsulosin  Hx of traumatic back fracture Feb 2018  · Keep brown cath in place for at least 1-2 wks prior to contemplating voiding trial   · Flexible cystoscopy, can be done as out patient if discharged home  · Leg bag, keep tension off brown  · Gross hematuria should resolve in the next 1-2 days as his bladder decompresses  · Continue the tamsulosin 0 4mg     HISTORY OF PRESENT ILLNESS:    Kendall Douglass is a 81y o  year old male who we are asked to see for urinary retention  Pt has a history of sustaining a back injury after falling off a ladder Feb 2019  Pt notes after his fall he was no longer exercising, rock climbing, or being physically active afterwards  Likes to [place] and thinks his abdominal distention was due to gaining weight  Pt would void 18-20 oz at time without straining, but notes for the past 6 months or so this went down to 12-14 oz at a time  No burning, abd pain, or sensation of urinary retention  Notes when bending over, has more of a desire to void  Was being evaluated for an abdominal mass and a CT was performed indicating a very distended bladder with hydronephrosis  Pt currently sitting in chair with brown in place  Distraught after being told he will need to keep the brown, and the possibility of needing to self cath or have a brown for life       PAST UROLOGIC HISTORY:  Notes had a cystoscopy done over 1 year ago in [facility] for an abnormal x-ray showing a possible bladder lesion  PAST MEDICAL HISTORY:  Past Medical History:   Diagnosis Date    Enlarged prostate        PAST SURGICAL HISTORY:  Past Surgical History:   Procedure Laterality Date    HERNIA REPAIR         ALLERGIES:  Allergies   Allergen Reactions    Aspirin GI Intolerance     Other reaction(s): GI upset  Reaction Date: ;    Other reaction(s): gi complications  Reaction Date: 2006;        SOCIAL HISTORY:  Social History     Substance and Sexual Activity   Alcohol Use Never    Frequency: Never     Social History     Substance and Sexual Activity   Drug Use Never     Social History     Tobacco Use   Smoking Status Former Smoker    Last attempt to quit:     Years since quittin 6   Smokeless Tobacco Never Used       FAMILY HISTORY:  Family History   Problem Relation Age of Onset    Diabetes Mother     Heart failure Father        MEDICATIONS:    Current Facility-Administered Medications:     acetaminophen (TYLENOL) tablet 650 mg, 650 mg, Oral, Q6H PRN, Myrtice Reasons, CRNP, 650 mg at 19    belladonna-opium (B&O SUPPOSITORY) 16 2-30 mg suppository 1 suppository, 30 mg, Rectal, Q8H PRN, Charol Ratel, CRNP    docusate sodium (COLACE) capsule 100 mg, 100 mg, Oral, BID, Charol Ratel, CRNP, 100 mg at 19 0910    enoxaparin (LOVENOX) subcutaneous injection 40 mg, 40 mg, Subcutaneous, Daily, Charol Ratel, CRNP, 40 mg at 19 0910    fluticasone (FLONASE) 50 mcg/act nasal spray 2 spray, 2 spray, Nasal, Daily, Charol Ratel, CRNP, 2 spray at 19 09    morphine injection 2 mg, 2 mg, Intravenous, Q4H PRN, Charol Ratel, CRNP    ondansetron [facility]) injection 4 mg, 4 mg, Intravenous, Q6H PRN, Charol Ratel, CRNP    polyethylene glycol (MIRALAX) packet 17 g, 17 g, Oral, Daily, Charol Ratel, CRNP, 17 g at 09/11/19 0910    potassium chloride (K-DUR,KLOR-CON) CR tablet 40 mEq, 40 mEq, Oral, TID With Meals, Megan Rodríguez CRNP, 40 mEq at 09/11/19 0914    sodium chloride 0 9 % infusion, 50 mL/hr, Intravenous, Continuous, Megan Rodríguez CRNP, Last Rate: 50 mL/hr at 09/11/19 0911, 50 mL/hr at 09/11/19 0911    tamsulosin (FLOMAX) capsule 0 4 mg, 0 4 mg, Oral, Daily With Merl Gaster, CRNP, 0 4 mg at 09/10/19 2053    REVIEW OF SYMPTOMS:  Review of Systems   Constitutional: Negative for fever  Gastrointestinal: Positive for abdominal distention  Genitourinary: Positive for decreased urine volume, enuresis and frequency  Negative for penile pain  PHYSICAL EXAM:  Vitals:    09/11/19 0909   BP: 138/91   Pulse: 74   Resp:    Temp: 97 7 °F (36 5 °C)   SpO2: 98%     Body mass index is 27 66 kg/m²  Physical Exam   Constitutional: He appears well-developed  HENT:   Head: Normocephalic  Pulmonary/Chest: Effort normal    Abdominal: Soft  Genitourinary:   Genitourinary Comments: Prostate grade III enlarged  Soft, NT, no nodularity  Sphincter tone intact  Perianal and saddle sensation were intact  Neurological: He is alert  Skin: Skin is warm  Psychiatric: He has a normal mood and affect  Vitals reviewed        Intake/Output Summary (Last 24 hours) at 9/11/2019 1024  Last data filed at 9/11/2019 2144  Gross per 24 hour   Intake --   Output 9525 ml   Net -9525 ml       LAB RESULTS:  Lab Results   Component Value Date    WBC 7 67 09/11/2019    HGB 11 7 (L) 09/11/2019    HCT 35 6 (L) 09/11/2019    MCV 98 09/11/2019     09/11/2019     Lab Results   Component Value Date    SODIUM 139 09/11/2019    K 3 3 (L) 09/11/2019     09/11/2019    CO2 28 09/11/2019    BUN 15 09/11/2019    CREATININE 1 20 09/11/2019    GLUC 107 09/11/2019    CALCIUM 8 1 (L) 09/11/2019     Lab Results   Component Value Date    CALCIUM 8 1 (L) 09/11/2019       OTHER STUDIES:  CT abd/pelvis with CTS 09/10/19  IMPRESSION:     New moderate bilateral hydronephroureterosis and marked distention of the bladder  This may be secondary to chronic partial bladder outlet obstruction given diffuse bladder wall trabeculation and moderate prostatomegaly      Minimal excretion of vascular contrast into the collecting system and bladder on post void imaging without significant change in degree of bladder distention and hydronephroureterosis      No perinephric fluid collection      Moderate chronic compression fracture of L1 and mild chronic compression fracture of left superior L5 endplate new since March 2017      No other significant interval change      The examination demonstrates a significant  finding and was documented as such in [facility] for liaison and referring practitioner notification  Jacob Faustin PA-C  09/11/19    Portions of the record may have been created with voice recognition software   Occasional wrong word or "sound alike" substitutions may have occurred due to the inherent limitations of voice recognition software   Read the chart carefully and recognize, using context, where substitutions have occurred

## 2019-09-11 NOTE — PHYSICIAN ADVISOR
Current patient class: Observation  The patient is currently on Hospital Day: 2 at [address] [place] [address]      This patient was originally admitted to the hospital under observation status  After admission the patient was reevaluated and determined to require further hospitalization  The patient is now documented to require at least a 2nd midnight in the hospital  As such the patient is now expected to satisfy the 2 midnight benchmark and is therefore eligible for inpatient admission  After review of the relevant documentation, labs, vital signs and test results, the patient is appropriate for INPATIENT ADMISSION  Admission to the hospital as an inpatient is a complex decision making process which requires the practitioner to consider the patients presenting complaint, history and physical examination and all relevant testing  With this in mind, in this case, the patient was deemed appropriate for INPATIENT ADMISSION  After review of the documentation and testing available at the time of the admission I concur with this clinical determination of medical necessity  Rationale is as follows: The patient is a 81 yrs Male who presented to the ED at 9/10/2019  2:28 PM with a chief complaint of Urinary Retention (Patient states he feels like he has not been emptying his bladder for 2-3 months)     The patient did have a CT scan which showed new moderate bilateral hydro nephroureteral CIS secondary to chronic partial bladder obstruction for which Urology was consulted  Patient was also found to be in mild acute renal insufficiency and put on some IV fluids  Patient had a Griffin catheter placed which came back with 3 8 liters  Given the documentation in the chart the patient is going to cross the 2 midnight benchmark as set by Medicare based on medical necessity and is therefore inpatient admission appropriate      The patients vitals on arrival were ED Triage Vitals [09/10/19 1424]   Temperature Pulse Respirations Blood Pressure SpO2   99 6 °F (37 6 °C) 93 16 (!) 158/106 96 %      Temp Source Heart Rate Source Patient Position - Orthostatic VS BP Location FiO2 (%)   Tympanic Monitor Sitting Right arm --      Pain Score       6           Past Medical History:   Diagnosis Date    Enlarged prostate      Past Surgical History:   Procedure Laterality Date    HERNIA REPAIR         The patient was admitted to the hospital at N/A on N/A for the following diagnosis:  Urinary retention [R33 9]  Acute urinary retention [R33 8]    Consults have been placed to:   IP CONSULT TO UROLOGY    Vitals:    09/11/19 0400 09/11/19 0800 09/11/19 0909 09/11/19 0909   BP: 124/72   138/91   BP Location:       Pulse: 70   74   Resp: 18  18    Temp: 97 8 °F (36 6 °C)   97 7 °F (36 5 °C)   TempSrc:   Oral    SpO2: 96% 97%  98%   Weight:       Height:           Most recent labs:    Recent Labs     09/10/19  1450 09/11/19  0531   WBC 6 18 7 67   HGB 12 1 11 7*   HCT 36 2* 35 6*    200   K 3 3* 3 3*   CALCIUM 8 1* 8 1*   BUN 15 15   CREATININE 1 32* 1 20   INR 1 02  --    AST 23 14   ALT 28 21   ALKPHOS 81 67       Scheduled Meds:  Current Facility-Administered Medications:  acetaminophen 650 mg Oral Q6H PRN Canfield Crank, CRNP    belladonna-opium 30 mg Rectal Q8H PRN Sobia Hylan, CRNP    docusate sodium 100 mg Oral BID Sobia Hylan, CRNP    fluticasone 2 spray Nasal Daily Sobia Hylan, CRNP    morphine injection 2 mg Intravenous Q4H PRN Sobia Hylan, CRNP    ondansetron 4 mg Intravenous Q6H PRN Sobia Hylan, CRNP    polyethylene glycol 17 g Oral Daily Sobia Hylan, CRNP    sodium chloride 50 mL/hr Intravenous Continuous Megan Rodríguez, CRNP Last Rate: 50 mL/hr (09/11/19 0911)   tamsulosin 0 4 mg Oral Daily With Dinner Sobia Hylan, CRNP      Continuous Infusions:  sodium chloride 50 mL/hr Last Rate: 50 mL/hr (09/11/19 0911)     PRN Meds:   acetaminophen   belladonna-opium    morphine injection    ondansetron    Surgical procedures (if appropriate):

## 2019-09-11 NOTE — ASSESSMENT & PLAN NOTE
Potassium 3 5 today  Ordered potassium 40 p o  X1   Repeat BMP in 4 days  High potassium diet at home

## 2019-09-11 NOTE — ASSESSMENT & PLAN NOTE
Acute urinary retention present on admission, evidenced by CT of abdomen pelvis which demonstrated marked distention of bladder with diffuse trabeculation  Secondary to BPH versus bladder dysfunction  Large prostate noted on GIANNA  Urology consulted, placed Griffin catheter in emergency department  Griffin catheter returned 3800 cc of pink tinged urine  Continue Flomax  Discontinued Lovenox in view of pinkish urine  Patient seen by Urology  Continue with Griffin for bladder decompression, follow-up cystoscopy in 1-2 weeks outpatient to evaluate for blockage versus bladder dysfunction, continue Flomax  Patient cleared for discharge by Urology  Follow-up in office in 1 week  Griffin teaching provided by RN  Will change to leg bag prior to discharge  Follow-up PCP 1 week

## 2019-09-11 NOTE — UTILIZATION REVIEW
Initial Clinical Review     Admission: Date/Time/Statement:   OBSERVATION ON 9/10/19 AND CHANGED TO INPATIENT ON 9/11/19 @ 1542      Admission Orders (From admission, onward)              Ordered          09/10/19 1700   Place in Observation  Once                             Orders Placed This Encounter   Procedures      09/11/19 1543  Inpatient Admission Once     Transfer Service: Hospitalist       Question Answer Comment   Admitting Physician [facility]    Level of Care Med Surg    Estimated length of stay More than 2 Midnights    Certification I certify that inpatient services are medically necessary for this patient for a duration of greater than two midnights  See H&P and MD Progress Notes for additional information about the patient's course of treatment  09/11/19 1542               ED Arrival Information      Expected Arrival Acuity Means of Arrival Escorted By Service Admission Type     - 9/10/2019 14:16 Urgent Walk-In Self Hospitalist Urgent     Arrival Complaint     urinary frequency               Chief Complaint   Patient presents with    Urinary Retention       Patient states he feels like he has not been emptying his bladder for 2-3 months      Assessment/Plan:   27-year-old male states he feels like he has not been emptying his bladder for the last 2-3 months   Went to his PCP order CT the abdomen pelvis today which found distended bladder and bilateral hydro ureteral nephrosis   Patient denies any pain with this states he occasionally does urinate quite a bit of urine throughout the day so does not understand why his bladder does not empty   Patient states he is also very thirsty   History of diabetes in the family  Acute urinary retention  Assessment & Plan  Acute urinary retention present on admission, evidence by CT of abdomen pelvis which demonstrated marked distention of bladder with diffuse trabeculation  Urology consulted, placed Griffin catheter emergency department    Griffin catheter returned 3800 cc of pink tinged urine  Will maintain Griffin catheter  Patient reports that he is having some bladder spasms  Belladonna suppository ordered for patient  Pain control  Monitor output  Monitor urine for signs of clots  Enlarged prostate  Assessment & Plan  Patient has a history of enlarged prostate, has been taking Flomax 0 4 mg p  O  Daily as prescribed by his primary care physician  Will continue  Urology consulted     Acute kidney injury [facility])  Assessment & Plan  Acute kidney injury present on admission evidence by creatinine 1 32, most recent creatinine 1 23  Most likely post renal secondary to urinary outflow blockage  Griffin catheter placed by Urology  Repeat BMP in a m   IV hydration normal saline at 125 cc an hour  Avoid nephrotoxin agents  Hypokalemia  Assessment & Plan  Hypokalemia present on admission evidence by potassium 3 3  Repleted with oral potassium in emergency department  Repeat BMP in a m  [facility]            ED Triage Vitals [09/10/19 1424]   Temperature Pulse Respirations Blood Pressure SpO2   99 6 °F (37 6 °C) 93 16 (!) 158/106 96 %       Temp Source Heart Rate Source Patient Position - Orthostatic VS BP Location FiO2 (%)   Tympanic Monitor Sitting Right arm --       Pain Score           6                Wt Readings from Last 1 Encounters:   09/10/19 95 1 kg (209 lb 10 5 oz)      Additional Vital Signs:   09/10/19 1830   98 3 °F (36 8 °C)   78   18   133/92   --   97 %   None (Room air)   Lying   09/10/19 1548   --   80   16   145/90   --   96 %   None (Room air)   Sitting   09/10/19 1424   99 6 °F (37 6 °C)   93   16   158/106Abnormal    --   96 %   None (Room air)   Sitting      Pertinent Labs/Diagnostic Test Results:          Results from last 7 days   Lab Units 09/11/19  0531 09/10/19  1450 09/06/19  0816   WBC Thousand/uL 7 67 6 18  --    WHITE BLOOD CELL COUNT  x10E3/uL  --   --  5 3   HEMOGLOBIN g/dL 11 7* 12 1  --    HEMOGLOBIN  g/dL  --   --  12 1* HEMATOCRIT % 35 6* 36 2*  --    HEMATOCRIT  %  --   --  36 6*   PLATELETS Thousands/uL 200 204  --    PLATELETS  X59K8/SQ  --   --  201   NEUTROS ABS Thousands/µL 5 58 4 06  --    NEUTROS ABS   x10E3/uL  --   --  2 6             Results from last 7 days   Lab Units 09/11/19  0531 09/10/19  1450 09/06/19  0816   SODIUM mmol/L 139 141 143   POTASSIUM mmol/L 3 3* 3 3* 4 2   CHLORIDE mmol/L 103 103 101   CO2 mmol/L 28 29 26   ANION GAP mmol/L 8 9  --    BUN mg/dL 15 15 19   CREATININE mg/dL 1 20 1 32* 1 23   EGFR ml/min/1 73sq m 62 55  --    CALCIUM mg/dL 8 1* 8 1*  --    MAGNESIUM mg/dL 1 8  --   --              Results from last 7 days   Lab Units 09/11/19  0531 09/10/19  1450 09/06/19  0816   AST U/L 14 23 16   ALT U/L 21 28 17   ALK PHOS U/L 67 81  --    TOTAL PROTEIN g/dL 6 6 7 1 6 6   ALBUMIN g/dL 3 5 3 9 4 5   TOTAL BILIRUBIN mg/dL 1 20* 1 30* 1 0   BILIRUBIN DIRECT mg/dL 0 20  --   --              Results from last 7 days   Lab Units 09/11/19  0531 09/10/19  1450 09/06/19  0816   GLUCOSE RANDOM mg/dL 107 107 100*           Results from last 7 days   Lab Units 09/06/19  0816   PH   6 5           Results from last 7 days   Lab Units 09/10/19  1450   PROTIME seconds 11 0   INR   1 02   PTT seconds 29           Results from last 7 days   Lab Units 09/11/19  0531   TSH 3RD GENERATON uIU/mL 1 342           Results from last 7 days   Lab Units 09/10/19  1450   NT-PRO BNP pg/mL 199*            Results from last 7 days   Lab Units 09/10/19  1526 09/06/19  0816   CLARITY UA   Clear  --    COLOR UA   Light Yellow Yellow   SPEC GRAV UA   <=1 005 1 009   PH UA   7 0  --    GLUCOSE UA mg/dl Negative  --    KETONES UA mg/dl Negative Negative   BLOOD UA   Negative Negative   PROTEIN UA mg/dl Negative  --    NITRITE UA   Negative Negative   BILIRUBIN UA   Negative  --    BILIRUBIN, URINE    --  Negative   UROBILINOGEN UA E U /dl 0 2 0 2   LEUKOCYTES UA   Negative Negative   WBC UA /hpf  --  0-5   RBC UA /hpf  --  0-2   BACTERIA UA    --  None seen   EPITHELEAL /hpf  --  None seen      CT A/P=New moderate bilateral hydronephroureterosis and marked distention of the bladder   This may be secondary to chronic partial bladder outlet obstruction given diffuse bladder wall trabeculation and moderate prostatomegaly  Minimal excretion of vascular contrast into the collecting system and bladder on post void imaging without significant change in degree of bladder distention and hydronephroureterosis  No perinephric fluid collection    Moderate chronic compression fracture of L1 and mild chronic compression fracture of left superior L5 endplate new since March 2017  ED Treatment:               Medication Administration from 09/10/2019 1416 to 09/10/2019 1823        Date/Time Order Dose Route Action Action by Comments       09/10/2019 1507 lidocaine (URO-JET) 2 % urethral/mucosal gel 1 application 1 application Urethral Given Dot Frank RN         09/10/2019 1722 potassium chloride (K-DUR,KLOR-CON) CR tablet 40 mEq 40 mEq Oral Given Dot Frank RN         09/10/2019 1724 sodium chloride 0 9 % infusion 125 mL/hr Intravenous New Bag Dot Frank RN            Medical History        Past Medical History:   Diagnosis Date    Enlarged prostate           Present on Admission:   Acute urinary retention   Enlarged prostate   Acute kidney injury ([address] )   Hypokalemia     Admitting Diagnosis: Urinary retention [R33 9]  Acute urinary retention [R33 8]  Age/Sex: 81 y o  male  Admission Orders:  MED SURG  CONSULT UROLOGY  GEOVANI  Current Facility-Administered Medications:  acetaminophen 650 mg Oral Q6H PRN   belladonna-opium 30 mg Rectal Q8H PRN   docusate sodium 100 mg Oral BID   enoxaparin 40 mg Subcutaneous Daily   fluticasone 2 spray Nasal Daily   morphine injection 2 mg Intravenous Q4H PRN   ondansetron 4 mg Intravenous Q6H PRN   polyethylene glycol 17 g Oral Daily   sodium chloride 125 mL/hr Intravenous Continuous   tamsulosin 0 4 mg Oral Daily With [facility] [address] [facility] Utilization Review Department  Phone: [phone]; Fax [phone]  [email or web address]  ATTENTION: Please call with any questions or concerns to [phone] and carefully listen to the prompts so that you are directed to the right person  Send all requests for admission clinical reviews, approved or denied determinations and any other requests to fax [phone]   All voicemails are confidential

## 2019-09-11 NOTE — UTILIZATION REVIEW
Initial Clinical Review    Admission: Date/Time/Statement:    Admission Orders (From admission, onward)     Ordered        09/10/19 1700  Place in Observation  Once                   Orders Placed This Encounter   Procedures    Place in Observation     Standing Status:   Standing     Number of Occurrences:   1     Order Specific Question:   Admitting Physician     Answer:   Isabella Mckeon     Order Specific Question:   Level of Care     Answer:   Med Surg [16]     ED Arrival Information     Expected Arrival Acuity Means of Arrival Escorted By Service Admission Type    - 9/10/2019 14:16 Urgent Walk-In Self Hospitalist Urgent    Arrival Complaint    urinary frequency        Chief Complaint   Patient presents with    Urinary Retention     Patient states he feels like he has not been emptying his bladder for 2-3 months     Assessment/Plan:   61-year-old male states he feels like he has not been emptying his bladder for the last 2-3 months  Went to his PCP order CT the abdomen pelvis today which found distended bladder and bilateral hydro ureteral nephrosis  Patient denies any pain with this states he occasionally does urinate quite a bit of urine throughout the day so does not understand why his bladder does not empty  Patient states he is also very thirsty  History of diabetes in the family  Acute urinary retention  Assessment & Plan  Acute urinary retention present on admission, evidence by CT of abdomen pelvis which demonstrated marked distention of bladder with diffuse trabeculation  Urology consulted, placed Griffin catheter emergency department  Griffin catheter returned 3800 cc of pink tinged urine  Will maintain Griffin catheter  Patient reports that he is having some bladder spasms  Belladonna suppository ordered for patient  Pain control  Monitor output  Monitor urine for signs of clots    Enlarged prostate  Assessment & Plan  Patient has a history of enlarged prostate, has been taking Flomax 0 4 mg p  O  Daily as prescribed by his primary care physician  Will continue  Urology consulted  Acute kidney injury [facility])  Assessment & Plan  Acute kidney injury present on admission evidence by creatinine 1 32, most recent creatinine 1 23  Most likely post renal secondary to urinary outflow blockage  Griffni catheter placed by Urology  Repeat BMP in a m   IV hydration normal saline at 125 cc an hour  Avoid nephrotoxin agents  Hypokalemia  Assessment & Plan  Hypokalemia present on admission evidence by potassium 3 3  Repleted with oral potassium in emergency department  Repeat BMP in a m  [facility] ED Triage Vitals [09/10/19 1424]   Temperature Pulse Respirations Blood Pressure SpO2   99 6 °F (37 6 °C) 93 16 (!) 158/106 96 %      Temp Source Heart Rate Source Patient Position - Orthostatic VS BP Location FiO2 (%)   Tympanic Monitor Sitting Right arm --      Pain Score       6        Wt Readings from Last 1 Encounters:   09/10/19 95 1 kg (209 lb 10 5 oz)     Additional Vital Signs:   09/10/19 1830  98 3 °F (36 8 °C)  78  18  133/92  --  97 %  None (Room air)  Lying   09/10/19 1548  --  80  16  145/90  --  96 %  None (Room air)  Sitting   09/10/19 1424  99 6 °F (37 6 °C)  93  16  158/106Abnormal   --  96 %  None (Room air)  Sitting     Pertinent Labs/Diagnostic Test Results:   Results from last 7 days   Lab Units 09/11/19  0531 09/10/19  1450 09/06/19  0816   WBC Thousand/uL 7 67 6 18  --    WHITE BLOOD CELL COUNT  x10E3/uL  --   --  5 3   HEMOGLOBIN g/dL 11 7* 12 1  --    HEMOGLOBIN  g/dL  --   --  12 1*   HEMATOCRIT % 35 6* 36 2*  --    HEMATOCRIT  %  --   --  36 6*   PLATELETS Thousands/uL 200 204  --    PLATELETS  D48M6/RX  --   --  201   NEUTROS ABS Thousands/µL 5 58 4 06  --    NEUTROS ABS   x10E3/uL  --   --  2 6     Results from last 7 days   Lab Units 09/11/19  0531 09/10/19  1450 09/06/19  0816   SODIUM mmol/L 139 141 143   POTASSIUM mmol/L 3 3* 3 3* 4 2   CHLORIDE mmol/L 103 103 101   CO2 mmol/L 28 29 26   ANION GAP mmol/L 8 9  --    BUN mg/dL 15 15 19   CREATININE mg/dL 1 20 1 32* 1 23   EGFR ml/min/1 73sq m 62 55  --    CALCIUM mg/dL 8 1* 8 1*  --    MAGNESIUM mg/dL 1 8  --   --      Results from last 7 days   Lab Units 09/11/19  0531 09/10/19  1450 09/06/19  0816   AST U/L 14 23 16   ALT U/L 21 28 17   ALK PHOS U/L 67 81  --    TOTAL PROTEIN g/dL 6 6 7 1 6 6   ALBUMIN g/dL 3 5 3 9 4 5   TOTAL BILIRUBIN mg/dL 1 20* 1 30* 1 0   BILIRUBIN DIRECT mg/dL 0 20  --   --      Results from last 7 days   Lab Units 09/11/19  0531 09/10/19  1450 09/06/19  0816   GLUCOSE RANDOM mg/dL 107 107 100*     Results from last 7 days   Lab Units 09/06/19  0816   PH  6 5     Results from last 7 days   Lab Units 09/10/19  1450   PROTIME seconds 11 0   INR  1 02   PTT seconds 29     Results from last 7 days   Lab Units 09/11/19  0531   TSH 3RD GENERATON uIU/mL 1 342     Results from last 7 days   Lab Units 09/10/19  1450   NT-PRO BNP pg/mL 199*     Results from last 7 days   Lab Units 09/10/19  1526 09/06/19  0816   CLARITY UA  Clear  --    COLOR UA  Light Yellow Yellow   SPEC GRAV UA  <=1 005 1 009   PH UA  7 0  --    GLUCOSE UA mg/dl Negative  --    KETONES UA mg/dl Negative Negative   BLOOD UA  Negative Negative   PROTEIN UA mg/dl Negative  --    NITRITE UA  Negative Negative   BILIRUBIN UA  Negative  --    BILIRUBIN, URINE   --  Negative   UROBILINOGEN UA E U /dl 0 2 0 2   LEUKOCYTES UA  Negative Negative   WBC UA /hpf  --  0-5   RBC UA /hpf  --  0-2   BACTERIA UA   --  None seen   EPITHELEAL /hpf  --  None seen     CT A/P=New moderate bilateral hydronephroureterosis and marked distention of the bladder  This may be secondary to chronic partial bladder outlet obstruction given diffuse bladder wall trabeculation and moderate prostatomegaly  Minimal excretion of vascular contrast into the collecting system and bladder on post void imaging without significant change in degree of bladder distention and hydronephroureterosis    No perinephric fluid collection  Moderate chronic compression fracture of L1 and mild chronic compression fracture of left superior L5 endplate new since March 2017  ED Treatment:   Medication Administration from 09/10/2019 1416 to 09/10/2019 1823       Date/Time Order Dose Route Action Action by Comments     09/10/2019 1507 lidocaine (URO-JET) 2 % urethral/mucosal gel 1 application 1 application Urethral Given Kevin Rios, DEXTER      09/10/2019 1722 potassium chloride (K-DUR,KLOR-CON) CR tablet 40 mEq 40 mEq Oral Given Kevin Rios, DEXTER      09/10/2019 1724 sodium chloride 0 9 % infusion 125 mL/hr Intravenous New Bag Kevin Rios RN         Past Medical History:   Diagnosis Date    Enlarged prostate      Present on Admission:   Acute urinary retention   Enlarged prostate   Acute kidney injury ([address] )   Hypokalemia    Admitting Diagnosis: Urinary retention [R33 9]  Acute urinary retention [R33 8]  Age/Sex: 81 y o  male  Admission Orders:  MED SURG  CONSULT UROLOGY  VENODYNES  Current Facility-Administered Medications:  acetaminophen 650 mg Oral Q6H PRN   belladonna-opium 30 mg Rectal Q8H PRN   docusate sodium 100 mg Oral BID   enoxaparin 40 mg Subcutaneous Daily   fluticasone 2 spray Nasal Daily   morphine injection 2 mg Intravenous Q4H PRN   ondansetron 4 mg Intravenous Q6H PRN   polyethylene glycol 17 g Oral Daily   sodium chloride 125 mL/hr Intravenous Continuous   tamsulosin 0 4 mg Oral Daily With [facility] Utilization Review Department  Phone: [phone]; Fax [phone]  [email or web address]  ATTENTION: Please call with any questions or concerns to [phone] and carefully listen to the prompts so that you are directed to the right person  Send all requests for admission clinical reviews, approved or denied determinations and any other requests to fax [phone]   All voicemails are confidential

## 2019-09-12 VITALS
HEART RATE: 63 BPM | SYSTOLIC BLOOD PRESSURE: 135 MMHG | RESPIRATION RATE: 18 BRPM | OXYGEN SATURATION: 97 % | DIASTOLIC BLOOD PRESSURE: 90 MMHG | TEMPERATURE: 98 F | BODY MASS INDEX: 27.79 KG/M2 | HEIGHT: 73 IN | WEIGHT: 209.66 LBS

## 2019-09-12 PROBLEM — R31.9 HEMATURIA: Status: ACTIVE | Noted: 2019-09-12

## 2019-09-12 LAB
ANION GAP SERPL CALCULATED.3IONS-SCNC: 7 MMOL/L (ref 4–13)
BUN SERPL-MCNC: 14 MG/DL (ref 5–25)
CALCIUM SERPL-MCNC: 8.2 MG/DL (ref 8.3–10.1)
CHLORIDE SERPL-SCNC: 104 MMOL/L (ref 100–108)
CO2 SERPL-SCNC: 28 MMOL/L (ref 21–32)
CREAT SERPL-MCNC: 1.13 MG/DL (ref 0.6–1.3)
ERYTHROCYTE [DISTWIDTH] IN BLOOD BY AUTOMATED COUNT: 12.6 % (ref 11.6–15.1)
GFR SERPL CREATININE-BSD FRML MDRD: 67 ML/MIN/1.73SQ M
GLUCOSE SERPL-MCNC: 112 MG/DL (ref 65–140)
HCT VFR BLD AUTO: 36.8 % (ref 36.5–49.3)
HGB BLD-MCNC: 11.8 G/DL (ref 12–17)
MAGNESIUM SERPL-MCNC: 1.9 MG/DL (ref 1.6–2.6)
MCH RBC QN AUTO: 32 PG (ref 26.8–34.3)
MCHC RBC AUTO-ENTMCNC: 32.1 G/DL (ref 31.4–37.4)
MCV RBC AUTO: 100 FL (ref 82–98)
MRSA NOSE QL CULT: NORMAL
PLATELET # BLD AUTO: 196 THOUSANDS/UL (ref 149–390)
PMV BLD AUTO: 10.4 FL (ref 8.9–12.7)
POTASSIUM SERPL-SCNC: 3.5 MMOL/L (ref 3.5–5.3)
RBC # BLD AUTO: 3.69 MILLION/UL (ref 3.88–5.62)
SODIUM SERPL-SCNC: 139 MMOL/L (ref 136–145)
WBC # BLD AUTO: 5.39 THOUSAND/UL (ref 4.31–10.16)

## 2019-09-12 PROCEDURE — 80048 BASIC METABOLIC PNL TOTAL CA: CPT | Performed by: NURSE PRACTITIONER

## 2019-09-12 PROCEDURE — 83735 ASSAY OF MAGNESIUM: CPT | Performed by: NURSE PRACTITIONER

## 2019-09-12 PROCEDURE — 85027 COMPLETE CBC AUTOMATED: CPT | Performed by: NURSE PRACTITIONER

## 2019-09-12 PROCEDURE — 99239 HOSP IP/OBS DSCHRG MGMT >30: CPT | Performed by: NURSE PRACTITIONER

## 2019-09-12 RX ORDER — POTASSIUM CHLORIDE 20 MEQ/1
40 TABLET, EXTENDED RELEASE ORAL ONCE
Status: COMPLETED | OUTPATIENT
Start: 2019-09-12 | End: 2019-09-12

## 2019-09-12 RX ADMIN — DOCUSATE SODIUM 100 MG: 100 CAPSULE, LIQUID FILLED ORAL at 10:26

## 2019-09-12 RX ADMIN — POTASSIUM CHLORIDE 40 MEQ: 1500 TABLET, EXTENDED RELEASE ORAL at 10:26

## 2019-09-12 RX ADMIN — Medication 400 MG: at 10:27

## 2019-09-12 RX ADMIN — FLUTICASONE PROPIONATE 2 SPRAY: 50 SPRAY, METERED NASAL at 10:27

## 2019-09-12 NOTE — SOCIAL WORK
Per chart review and rounds with nursing and provider pt is reported to be independent with no apparent discharge needs at this time

## 2019-09-12 NOTE — PROGRESS NOTES
Progress Note - Urology   Zabrina Chanel 81 y o  male MRN: [id number]  Unit/Bed#: [address] [place] [address] Encounter: [id number]    Assessment/Plan:  Urinary retention  Urinary retention without bladder pain or sensation for 3800cc  BPH vs bladder dysfunction  Large prostate noted on GIANNA  · Continue with brown for bladder decompression   · F/u cystoscopy in 1-2wks out pt to evaluate for blockage vs bladder dysfunction   · Continue with tamsulosin 0 4mg     Hematuria  Gross hematuria after brown placed  Post diuresis hematuria  Cleared last PM and returned this AM   NO clots present  · Encourage fluids  · Pt OK for discharge with his pinkish/reddish urine  · Will f/u out pt    ADRIENNE  ADRIENNE secondary to urine retention and b/l hydronephrosis  Creatinine continues to improve with brown  Creatinine 09/12/19 was 1 13  · Will keep brown in place to allow maximum drainage       Subjective:  Seen on rounds and notes the following: Pt standing in his room in NAD  Notes some return of his hematuria this AM   Brown not painful, just an unusual sensation for him  Been walking around the room without issues  Objective:  Vitals Last 24 hours:   Temp:  [97 7 °F (36 5 °C)-98 °F (36 7 °C)] 98 °F (36 7 °C)  HR:  [54-69] 63  Resp:  [18] 18  BP: (131-157)/(90-95) 135/90  BMI: Body mass index is 27 66 kg/m²  Physical Exam   HENT:   Head: Normocephalic  Pulmonary/Chest: Effort normal    Neurological: He is alert  Skin: Skin is warm  Psychiatric: He has a normal mood and affect  Vitals reviewed  Drains:   Brown draining reddish urine without clots       Lab Results and Cultures:   Results from last 7 days   Lab Units 09/12/19  0515 09/11/19  0531 09/10/19  1450 09/06/19  0816   WBC Thousand/uL 5 39 7 67 6 18  --    WHITE BLOOD CELL COUNT  x10E3/uL  --   --   --  5 3   RBC Million/uL 3 69* 3 64* 3 66*  --    RED BLOOD CELL COUNT  x10E6/uL  --   --   --  3 77*   HEMOGLOBIN g/dL 11 8* 11 7* 12 1  --    HEMOGLOBIN  g/dL  --   --   -- 12 1*   HEMATOCRIT % 36 8 35 6* 36 2*  --    HEMATOCRIT  %  --   --   --  36 6*   PLATELETS Thousands/uL 196 200 204  --    PLATELETS  J85C1/AU  --   --   --  201   POTASSIUM mmol/L 3 5 3 3* 3 3* 4 2   CHLORIDE mmol/L 104 103 103 101   CO2 mmol/L 28 28 29 26   BUN mg/dL 14 15 15 19   CREATININE mg/dL 1 13 1 20 1 32* 1 23   MAGNESIUM mg/dL 1 9 1 8  --   --    CALCIUM mg/dL 8 2* 8 1* 8 1*  --    AST U/L  --  14 23 16   ALT U/L  --  21 28 17   ALK PHOS U/L  --  67 81  --    EGFR ml/min/1 73sq m 67 62 55  --    PTT seconds  --   --  29  --    INR   --   --  1 02  --              Results from last 7 days   Lab Units 09/10/19  1526   COLOR UA  Light Yellow   CLARITY UA  Clear   SPEC GRAV UA  <=1 005   PH UA  7 0   LEUKOCYTES UA  Negative   NITRITE UA  Negative   GLUCOSE UA mg/dl Negative   KETONES UA mg/dl Negative   UROBILINOGEN UA E U /dl 0 2   BILIRUBIN UA  Negative   BLOOD UA  Negative       Lab Results   Component Value Date    URINECX No Growth <1000 cfu/mL 09/10/2019         Intake/Output Summary (Last 24 hours) at 9/12/2019 1027  Last data filed at 9/12/2019 0517  Gross per 24 hour   Intake 480 ml   Output 3600 ml   Net -3120 ml       Imaging:  CT abd/pelvis with CTS 09/10/19  IMPRESSION:     New moderate bilateral hydronephroureterosis and marked distention of the bladder   This may be secondary to chronic partial bladder outlet obstruction given diffuse bladder wall trabeculation and moderate prostatomegaly      Minimal excretion of vascular contrast into the collecting system and bladder on post void imaging without significant change in degree of bladder distention and hydronephroureterosis      No perinephric fluid collection      Moderate chronic compression fracture of L1 and mild chronic compression fracture of left superior L5 endplate new since March 2017      No other significant interval change      The examination demonstrates a significant  finding and was documented as such in Epic for liaison and referring practitioner notification  Brian Zavala PA-C  9/12/2019    Portions of the record may have been created with voice recognition software   Occasional wrong word or "sound alike" substitutions may have occurred due to the inherent limitations of voice recognition software   Read the chart carefully and recognize, using context, where substitutions have occurred

## 2019-09-12 NOTE — PLAN OF CARE
Problem: PAIN - ADULT  Goal: Verbalizes/displays adequate comfort level or baseline comfort level  Description  Interventions:  - Encourage patient to monitor pain and request assistance  - Assess pain using numerical pain scale  - Administer analgesics based on type and severity of pain and evaluate response  - Implement non-pharmacological measures as appropriate and evaluate response  - Consider cultural and social influences on pain and pain management  - Notify physician/advanced practitioner if interventions unsuccessful or patient reports new pain  Outcome: Progressing     Problem: INFECTION - ADULT  Goal: Absence or prevention of progression during hospitalization  Description  INTERVENTIONS:  - Assess and monitor for signs and symptoms of infection  - Monitor lab/diagnostic results  - Monitor all insertion sites, i e  indwelling lines, tubes, brown  - [place] appropriate cooling/warming therapies per order  - Administer medications as ordered  - Instruct and encourage patient and family to use good hand hygiene technique   Outcome: Progressing     Problem: GENITOURINARY - ADULT  Goal: Maintains or returns to baseline urinary function  Description  INTERVENTIONS:  - Assess urinary function  - Encourage oral fluids to ensure adequate hydration if ordered  - Administer IV fluids as ordered to ensure adequate hydration  - Administer ordered medications as needed  - Offer frequent toileting  - Follow urinary retention protocol if ordered  Outcome: Progressing  Goal: Absence of urinary retention  Description  INTERVENTIONS:  - Assess patient's ability to void and empty bladder  - Monitor I/O  - Bladder scan as needed  - Discuss with physician/AP medications to alleviate retention as needed  - Discuss catheterization for long term situations as appropriate   Outcome: Progressing  Goal: Urinary catheter remains patent  Description  INTERVENTIONS:  - Assess patency of urinary catheter  - If patient has a chronic brown, consider changing catheter if non-functioning  - Follow guidelines for intermittent irrigation of non-functioning urinary catheter  Outcome: Progressing

## 2019-09-12 NOTE — NURSING NOTE
Brown care instructions and high K+ and fiber diet guide, and given to pt  Also, pt was given care supplies for brown  IV removed prior to discharge  AVS reviewed with pt and wife  Pt left via ambulation in no distress with belongings accompanied by wife  [facility]  [facility] INVASIVE BARIATRIC SURG  [address]  [address]  [address]  [address]  Dept: [phone]    SURGICAL WEIGHT LOSS MANAGEMENT PROGRAM  PROGRESS NOTE POST-OP    CC: Weight loss after bariatric surgery    Patient: Betzy Christianson      Service Date: 11/3/2017  Visit:   1 week    Medical Record #: [id number]  Date of Surgery:   10/23/2017    Reason for Visit: Routine Post-Operative:  [] New Problem /   [x] FU of existing problem     Patient here for 1 week post gastric bypass visit. No nausea, abdominal pain, fevers/chills, GERD, shortness of breath, chest pain or dysphagia. She is having bowel movements and voiding. Height: 5' 4\" (1.626 m)  Highest Weight:   283 lbs    Current Visit Weight Information  Weight: 257 lb (116.6 kg)   BMI: Body mass index is 44.11 kg/m². Weight loss since surgery: 26 lbs         [Labs to be drawn prior to 1m, 3m, 6m, 12m, 18m, and annual visits]    Liver pathology:    [] NA    [] No Gross path    [x] Liver Steatosis   [x] Discussed w/ pt   [] Referred to GI    Exercising?    [] Yes    [x] No     Pre-OP Comorbidities: Hypertension, Obstructive Sleep Apnea treated with BiPAP/CPAP and GERD    Review of Systems:     General  Negative for: [] Weight Change   [x] Fatigue   [x] Fevers & Chills    [] Appetite change [] Other:    Positive for: [] Weight Change   [] Fatigue   [] Fevers & Chills    [] Appetite change [] Other:   Cardiac  Negative for: [x] Chest Pain   [] Difficulty Breathing   [x] Leg Cramps [x] Edema of Lower Extremeties    [] Left   [] Right      Positive for: [] Chest Pain   [] Difficulty Breathing   [] Leg Cramps [] Edema of Lower Extremeties    [] Left   [] Right   Pulmonary  Negative for: [x] Shortness of Breath [] Wheeze [x] Cough  [x] Calf Pain     Positive for: [] Shortness of Breath [] Wheeze [] Cough  [] Calf Pain   Gastro-Intestinal Negative for: [x] Heartburn   [x] Reflux   [x] Dysphagia   [x] Melena   [x] BRBPR  [x] Vomiting   [x] Abdominal Pain   [x] Diarrhea     [x] Constipation  [] Other:     Positive for: [] Heartburn   [] Reflux   [] Dysphagia   [] Melena   [] BRBPR  [] Vomiting   [] Abdominal Pain   [] Diarrhea     [] Constipation  [] Other:    Muskuloskeletal Negative for: [] Joint pain   [] Back pain   [] Knee Pain   [x] Muscle weakness [x] Hernia   [x] Edema [] Other:     Positive for: [] Joint pain   [] Back pain   [] Knee Pain   [] Muscle weakness [] Hernia   [] Edema [] Other:    Neurologic Negative for: [x] Syncope   [] Insomnia   [] Being treated for depression  [] Other:     Positive for: [] Syncope   [] Insomnia   [] Being treated for depression  [] Other:    Skin Negative for: [x] Wound:   [] Open   [] Draining   [] Incisional     [x] Rash   [] Hair Loss  [] Other:     Positive for: [] Wound:   [] Open   [] Draining    [] Incisional  [] Rash   [] Hair Loss  [] Other:          Physical Assessment:     /80 (Site: Right Arm, Position: Sitting, Cuff Size: Small Adult)   Pulse 100   Temp 98.1 °F (36.7 °C) (Oral)   Resp 12   Ht 5' 4\" (1.626 m)   Wt 257 lb (116.6 kg)   BMI 44.11 kg/m²   General Negative for:  [x] Lapses in memory   [x] Unusual Stress   [x] Diffic Concen [] Unable to sleep   [] Eating in response to stress   [] Other:    Positive for:  [] Lapses in memory   [] Unusual Stress   [] Diffic Concen [] Unable to sleep   [] Eating in response to stress   [] Other:   Cardio-Pulmonary   [x] Heart RRR   [x] No murmurs   [] Pulses nl x4 extrem    [x] Good inspiratory effort   [x] No wheezing     [x] Lungs clear to auscultation bilaterally    [] Other:    Gastro-Intestinal   [x] Abd S/NT/ND/Benign   [x] No abdominal mass/hernia    [x] No Splenomegaly    [] Other:    Muskuloskeletal   [x] Good muscle strength x4 extremities   [x] nl gait and ambul    [x] Nl ROM x4 extremities    [] Other:    Neurologic   [x] Alert and oriented x3    [] Other:   Skin   [x] Intact w/ no open wounds   [x] Incisions C/D/I    [x] Steri

## 2019-09-12 NOTE — DISCHARGE INSTRUCTIONS
Follow-up Dr Violet Sandhu in 1 week, please call to make appointment  Griffin care  Wash urethral twice a day with soap and water  Please empty leg bag when it is more than half full  Eat high potassium diet at home  Follow-up PCP in 1 week  Repeat BMP, CBC on Monday next week  Avoid NSAIDs at home  Plain Tylenol prn for pain  CT scan showed descending and sigmoid colonic diverticulosis  Recommend over-the-counter Metamucil daily to prevent constipation and help to prevent infection  CT scan showed moderate chronic compression fracture of L1 and mild chronic compression fracture of left superior L5 endplate new since March 2017  You have benign renal and liver cyst on CT scan  No further workup is recommended by Radiology

## 2019-09-12 NOTE — ASSESSMENT & PLAN NOTE
Mild gross hematuria after Griffin placed in ED  UA negative for UTI  Hematuria cleared overnight, returned this morning with activity/movement  No clots seen  Patient seen by Urology  Encourage p o  Fluids intake, okay to discharge with pinkish/redish urine, follow-up outpatient in 1 week  Mild decrease in hemoglobin  Hemoglobin 12 1->11 8 today  Repeat CBC in 4 days

## 2019-09-12 NOTE — DISCHARGE SUMMARY
Discharge- Zabrina Chanel 1950, 81 y o  male MRN: [id number]    Unit/Bed#: [address] [place] [address] Encounter: [id number]    Primary Care Provider: Yesica Santos DO   Date and time admitted to hospital: 9/10/2019  2:28 PM        * Acute urinary retention  Assessment & Plan  Acute urinary retention present on admission, evidenced by CT of abdomen pelvis which demonstrated marked distention of bladder with diffuse trabeculation  Secondary to BPH versus bladder dysfunction  Large prostate noted on GIANNA  Urology consulted, placed Griffin catheter in emergency department  Griffin catheter returned 3800 cc of pink tinged urine  Continue Flomax  Discontinued Lovenox in view of pinkish urine  Patient seen by Urology  Continue with Griffin for bladder decompression, follow-up cystoscopy in 1-2 weeks outpatient to evaluate for blockage versus bladder dysfunction, continue Flomax  Patient cleared for discharge by Urology  Follow-up in office in 1 week  Griffin teaching provided by RN  Will change to leg bag prior to discharge  Follow-up PCP 1 week  Acute kidney injury [facility])  Assessment & Plan  Acute kidney injury present on admission evidence by creatinine 1 32, most recent creatinine 1 23  Most likely post renal secondary to urinary outflow blockage  Creatinine normalized  Griffin catheter placed by Urology  Discharge patient home today  Repeat BMP in 4 days  Hydronephrosis  Assessment & Plan  CT scan showed 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 secondary to chronic partial bladder outlet obstruction given diffuse bladder wall trabeculation and moderate prostatomegaly  Continue Griffin on discharge  Follow-up urology in 1 week  Repeat BMP in 4 days  Hematuria  Assessment & Plan  Mild gross hematuria after Griffin placed in ED  UA negative for UTI  Hematuria cleared overnight, returned this morning with activity/movement  No clots seen  Patient seen by Urology  Encourage p o   Fluids intake, okay to discharge with pinkish/redish urine, follow-up outpatient in 1 week  Mild decrease in hemoglobin  Hemoglobin 12 1->11 8 today  Repeat CBC in 4 days  Hypokalemia  Assessment & Plan  Potassium 3 5 today  Ordered potassium 40 p o  X1   Repeat BMP in 4 days  High potassium diet at home  Enlarged prostate  Assessment & Plan  Patient has a history of enlarged prostate, has been taking Flomax 0 4 mg p  O  Daily as prescribed by his primary care physician  Will continue    Follow-up urology as outpatient      Discharging Physician / Practitioner: Brendan Mcgarry  PCP: Meseret Lemus DO  Admission Date: 9/10/2019  Discharge Date: 09/12/19    Reason for Admission: Urinary Retention (Patient states he feels like he has not been emptying his bladder for 2-3 months)        Resolved Problems  Date Reviewed: 9/12/2019    None          Consultations During Hospital Stay:  Elizabeth Andersen TO UROLOGY    Procedures Performed:     · none    Significant Findings / Test Results:     · As below  Results from last 7 days   Lab Units 09/12/19  0515 09/11/19  0531 09/10/19  1450   WBC Thousand/uL 5 39 7 67 6 18   HEMOGLOBIN g/dL 11 8* 11 7* 12 1   PLATELETS Thousands/uL 196 200 204     Results from last 7 days   Lab Units 09/12/19  0515 09/11/19  0531 09/10/19  1450 09/06/19  0816   SODIUM mmol/L 139 139 141 143   POTASSIUM mmol/L 3 5 3 3* 3 3* 4 2   CHLORIDE mmol/L 104 103 103 101   CO2 mmol/L 28 28 29 26   BUN mg/dL 14 15 15 19   CREATININE mg/dL 1 13 1 20 1 32* 1 23   CALCIUM mg/dL 8 2* 8 1* 8 1*  --    TOTAL BILIRUBIN mg/dL  --  1 20* 1 30* 1 0   ALK PHOS U/L  --  67 81  --    ALT U/L  --  21 28 17   AST U/L  --  14 23 16     Results from last 7 days   Lab Units 09/10/19  1450   INR  1 02         No results found for: HGBA1C          Blood Culture: No results found for: BLOODCX  Urine Culture:   Lab Results   Component Value Date    URINECX No Growth <1000 cfu/mL 09/10/2019    URINECX No Growth <1000 cfu/mL 04/28/2017     Sputum Culture: No components found for: SPUTUMCX  Wound Culture: No results found for: WOUNDCULT     No orders to display          Incidental Findings:   · Colonic diverticulosis, L1 and L5 chronic compression fractures, renal and liver benign cysts  Discussed findings with patient  Test Results Pending at Discharge (will require follow up): · None     Outpatient Tests Requested:  · CBC, BMP in 4 days    Complications:  None    Reason for Admission:   Chief Complaint   Patient presents with    Urinary Retention     Patient states he feels like he has not been emptying his bladder for 2-3 months       Hospital Course:     Na Shore is a 81 y o  male patient with a PMH of BPH who originally presented to the hospital on 9/10/2019 due to acute urinary retention, acute kidney injury and bilateral hydronephrosis  Griffin was inserted in ED yielded 3800 ml of pink urine  New moderate bilateral hydronephroureterosis 2/2 chronic partial bladder outlet obstruction was showing on CT  Acute kidney injury resolved with IV hydration  Mild gross hematuria post Griffin insertion  Hematuria clears overnight, returns in the morning when patient starts to move around during his stay  Patient was seen by Urology  Recommend to keep Griffin for bladder decompression, follow-up cystoscopy in 1-2 weeks as outpatient to evaluate for blockage versus bladder dysfunction, continue Flomax  Okay to discharge patient home with mild hematuria, encouraged p o  Fluid intake at home  Mild decrease in hemoglobin during his stay  Will repeat BMP, CBC in 4 days  Follow-up PCP 1 week  Follow-up Urology in 1 week  Advised high potassium diet at home as potassium was on low side during his stay due to postobstructive diuresis  Encouraged p o  Fluid intake at home  Spoke to patient at bedside, all questions answered  Please see above list of diagnoses and related plan for additional information       Condition at Discharge: good       Discharge Day Visit / Exam:     Subjective:  Patient reports bladder spasm 3 times overnight  Reports blood in the urine after he gets up and moved around this morning  Denies fever, chills  Denies chest pain, headaches, dizziness, SOB, nausea, vomiting, diarrhea  Reports no BM for 3 days but denies feeling constipated  Vitals: Blood Pressure: 135/90 (09/12/19 0740)  Pulse: 63 (09/12/19 0740)  Temperature: 98 °F (36 7 °C) (09/12/19 0740)  Temp Source: Oral (09/12/19 0740)  Respirations: 18 (09/12/19 0740)  Height: 6' 1" (185 4 cm) (09/10/19 1424)  Weight - Scale: 95 1 kg (209 lb 10 5 oz) (09/10/19 1424)  SpO2: 97 % (09/12/19 0740)  Exam:   Physical Exam   Constitutional: He is oriented to person, place, and time  He appears well-developed and well-nourished  HENT:   Head: Normocephalic and atraumatic  Neck: Normal range of motion  Neck supple  No JVD present  No tracheal deviation present  No thyromegaly present  Cardiovascular: Regular rhythm, normal heart sounds and intact distal pulses  No murmur heard  Slight tachycardic currently  Hr low 100's  Pulmonary/Chest: Effort normal and breath sounds normal  No respiratory distress  He has no wheezes  He has no rales  Abdominal: Soft  Bowel sounds are normal  He exhibits no distension  There is no tenderness  There is no guarding  Genitourinary:   Genitourinary Comments: Griffin draining mild reddish urine, no clots  UO 3600 past 24 hours  Musculoskeletal: He exhibits no edema, tenderness or deformity  Neurological: He is alert and oriented to person, place, and time  Skin: Skin is warm and dry  Psychiatric: He has a normal mood and affect  Judgment normal    Nursing note and vitals reviewed  Discharge instructions/Information to patient and family:   See after visit summary for information provided to patient and family        Provisions for Follow-Up Care:  See after visit summary for information related to follow-up care and any pertinent home health orders  Disposition:     Home    Planned Readmission: no     Discharge Statement:  I spent 35 minutes discharging the patient  This time was spent on the day of discharge  I had direct contact with the patient on the day of discharge  Greater than 50% of the total time was spent examining patient, answering all patient questions, arranging and discussing plan of care with patient as well as directly providing post-discharge instructions  Additional time then spent on discharge activities  Discharge Medications:  See after visit summary for reconciled discharge medications provided to patient and family        ** Please Note: This note has been constructed using a voice recognition system **

## 2019-09-12 NOTE — PLAN OF CARE
Problem: PAIN - ADULT  Goal: Verbalizes/displays adequate comfort level or baseline comfort level  Description  Interventions:  - Encourage patient to monitor pain and request assistance  - Assess pain using appropriate pain scale  - Administer analgesics based on type and severity of pain and evaluate response  - Implement non-pharmacological measures as appropriate and evaluate response  - Consider cultural and social influences on pain and pain management  - Notify physician/advanced practitioner if interventions unsuccessful or patient reports new pain  Outcome: Progressing     Problem: INFECTION - ADULT  Goal: Absence or prevention of progression during hospitalization  Description  INTERVENTIONS:  - Assess and monitor for signs and symptoms of infection  - Monitor lab/diagnostic results  - Monitor all insertion sites, i e  indwelling lines, tubes, and drains  - Pierce appropriate cooling/warming therapies per order  - Administer medications as ordered  - Instruct and encourage patient and family to use good hand hygiene technique  - Identify and instruct in appropriate isolation precautions for identified infection/condition   Outcome: Progressing     Problem: GENITOURINARY - ADULT  Goal: Maintains or returns to baseline urinary function  Description  INTERVENTIONS:  - Assess urinary function  - Encourage oral fluids to ensure adequate hydration if ordered  - Administer IV fluids as ordered to ensure adequate hydration  - Administer ordered medications as needed  - Offer frequent toileting  - Follow urinary retention protocol if ordered  Outcome: Progressing  Goal: Absence of urinary retention  Description  INTERVENTIONS:  - Assess patient's ability to void and empty bladder  - Monitor I/O  - Bladder scan as needed  - Discuss with physician/AP medications to alleviate retention as needed  - Discuss catheterization for long term situations as appropriate   Outcome: Progressing  Goal: Urinary catheter remains patent  Description  INTERVENTIONS:  - Assess patency of urinary catheter  - If patient has a chronic brown, consider changing catheter if non-functioning  - Follow guidelines for intermittent irrigation of non-functioning urinary catheter  Outcome: Progressing

## 2019-09-13 ENCOUNTER — HOSPITAL ENCOUNTER (EMERGENCY)
Facility: HOSPITAL | Age: 69
Discharge: HOME | End: 2019-09-13
Attending: EMERGENCY MEDICINE
Payer: MEDICARE

## 2019-09-13 ENCOUNTER — TRANSITIONAL CARE MANAGEMENT (OUTPATIENT)
Dept: FAMILY MEDICINE CLINIC | Facility: CLINIC | Age: 69
End: 2019-09-13

## 2019-09-13 VITALS
DIASTOLIC BLOOD PRESSURE: 100 MMHG | HEIGHT: 74 IN | BODY MASS INDEX: 26.18 KG/M2 | HEART RATE: 88 BPM | WEIGHT: 204 LBS | OXYGEN SATURATION: 97 % | SYSTOLIC BLOOD PRESSURE: 176 MMHG | RESPIRATION RATE: 18 BRPM | TEMPERATURE: 98.7 F

## 2019-09-13 DIAGNOSIS — T83.091A OBSTRUCTION OF FOLEY CATHETER (HCC): Primary | ICD-10-CM

## 2019-09-13 PROCEDURE — 99283 EMERGENCY DEPT VISIT LOW MDM: CPT

## 2019-09-13 NOTE — ED NOTES
Flushed with 60cc Normal saline with return of clear fluid, no blood return or clots noted       Augusto Alvarez RN  09/13/19 5939

## 2019-09-13 NOTE — ED PROVIDER NOTES
History  Chief Complaint   Patient presents with    Urinary Catheter Problem     Patient stated that as of 5am this morning his catheter ios blocked and he found heavy coagulation in the bag and it is not draining and what is draining is blood     HPI    81 year male that presents today with blocked Griffin  Patient was recently here was admitted to the hospital   Was seen by Neurology  Has a Griffin in place  Patient states whenever he ambulates he noticed some drainage of blood  States when he is lying flat it is draining clear  States this morning been draining he noticed some clots  Patient is not on any blood thinners  Denies any abdominal pain no fevers or chills  No other complaints  81 year male that presents today with a blocked Griffin  Will flush if needed will change Griffin    Prior to Admission Medications   Prescriptions Last Dose Informant Patient Reported? Taking?   fluticasone (FLONASE) 50 mcg/act nasal spray 2019 at Unknown time Self Yes Yes   Si puffs into each nostril daily   tamsulosin (FLOMAX) 0 4 mg 2019 at Unknown time Self Yes Yes   Sig: Take 0 4 mg by mouth daily with dinner      Facility-Administered Medications: None       Past Medical History:   Diagnosis Date    Enlarged prostate     Urinary bladder disorder        Past Surgical History:   Procedure Laterality Date    HERNIA REPAIR         Family History   Problem Relation Age of Onset    Diabetes Mother     Heart failure Father      I have reviewed and agree with the history as documented  Social History     Tobacco Use    Smoking status: Former Smoker     Last attempt to quit: 2013     Years since quittin 7    Smokeless tobacco: Never Used   Substance Use Topics    Alcohol use: Never     Frequency: Never    Drug use: Never        Review of Systems   Constitutional: Negative  Negative for diaphoresis and fever  HENT: Negative  Respiratory: Negative    Negative for cough, shortness of breath and wheezing  Cardiovascular: Negative  Negative for chest pain, palpitations and leg swelling  Gastrointestinal: Negative for abdominal distention, abdominal pain, nausea and vomiting  Genitourinary: Negative  Musculoskeletal: Negative  Skin: Negative  Neurological: Negative  Psychiatric/Behavioral: Negative  All other systems reviewed and are negative  Physical Exam  Physical Exam   Constitutional: He is oriented to person, place, and time  He appears well-developed and well-nourished  No distress  HENT:   Head: Normocephalic and atraumatic  Nose: Nose normal    Mouth/Throat: Oropharynx is clear and moist    Eyes: Pupils are equal, round, and reactive to light  Conjunctivae and EOM are normal    Neck: Normal range of motion  Neck supple  Cardiovascular: Normal rate, regular rhythm and normal heart sounds  No murmur heard  Pulmonary/Chest: Effort normal and breath sounds normal  No respiratory distress  He has no wheezes  He has no rales  Abdominal: Soft  Bowel sounds are normal  He exhibits no distension  There is no tenderness  There is no rebound and no guarding  Musculoskeletal: Normal range of motion  He exhibits no edema, tenderness or deformity  Neurological: He is alert and oriented to person, place, and time  No cranial nerve deficit  Skin: Skin is warm and dry  No rash noted  He is not diaphoretic  No pallor  Psychiatric: He has a normal mood and affect  Vitals reviewed        Vital Signs  ED Triage Vitals [09/13/19 0805]   Temperature Pulse Respirations Blood Pressure SpO2   98 7 °F (37 1 °C) 88 18 (!) 176/100 97 %      Temp src Heart Rate Source Patient Position - Orthostatic VS BP Location FiO2 (%)   -- -- -- -- --      Pain Score       No Pain           Vitals:    09/13/19 0805 09/13/19 0815   BP: (!) 176/100 (!) 176/100   Pulse: 88          Visual Acuity      ED Medications  Medications - No data to display    Diagnostic Studies  Results Reviewed     None No orders to display              Procedures  Procedures       ED Course  ED Course as of Sep 13 1423   Fri Sep 13, 2019   0845 Brown was clogged and now flushed, clear urine return  Will discharge  5183 Patient ambulated and no blood from brown                                   MDM    Disposition  Final diagnoses:   Obstruction of Brown catheter [facility])     Time reflects when diagnosis was documented in both MDM as applicable and the Disposition within this note     Time User Action Codes Description Comment    9/13/2019  9:03 AM Brook Blevins Add [[id number] [id number]] Obstruction of Brown catheter [facility])       ED Disposition     ED Disposition Condition Date/Time Comment    Discharge Stable Fri Sep 13, 2019  9:04 AM Reese Goddard discharge to home/self care  Follow-up Information     Follow up With Specialties Details Why Edson Nogueira MD Urology Schedule an appointment as soon as possible for a visit   Suzanne Roman [address] [place] [address]  [phone]            Discharge Medication List as of 9/13/2019  9:04 AM      CONTINUE these medications which have NOT CHANGED    Details   fluticasone (FLONASE) 50 mcg/act nasal spray 2 puffs into each nostril daily, Starting Tue 3/7/2017, Historical Med      tamsulosin (FLOMAX) 0 4 mg Take 0 4 mg by mouth daily with dinner, Historical Med           No discharge procedures on file      ED Provider  Electronically Signed by           Martha Mathur MD  09/13/19 6938

## 2019-09-17 ENCOUNTER — OFFICE VISIT (OUTPATIENT)
Dept: FAMILY MEDICINE CLINIC | Facility: CLINIC | Age: 69
End: 2019-09-17
Payer: MEDICARE

## 2019-09-17 VITALS
HEIGHT: 74 IN | TEMPERATURE: 97.8 F | WEIGHT: 200 LBS | SYSTOLIC BLOOD PRESSURE: 124 MMHG | HEART RATE: 78 BPM | OXYGEN SATURATION: 98 % | RESPIRATION RATE: 16 BRPM | BODY MASS INDEX: 25.67 KG/M2 | DIASTOLIC BLOOD PRESSURE: 86 MMHG

## 2019-09-17 DIAGNOSIS — N40.0 ENLARGED PROSTATE WITHOUT LOWER URINARY TRACT SYMPTOMS (LUTS): Primary | ICD-10-CM

## 2019-09-17 LAB
BASOPHILS # BLD AUTO: 0 X10E3/UL (ref 0–0.2)
BASOPHILS NFR BLD AUTO: 0 %
BUN SERPL-MCNC: 21 MG/DL (ref 8–27)
BUN/CREAT SERPL: 20 (ref 10–24)
CALCIUM SERPL-MCNC: 9.4 MG/DL (ref 8.6–10.2)
CHLORIDE SERPL-SCNC: 98 MMOL/L (ref 96–106)
CO2 SERPL-SCNC: 26 MMOL/L (ref 20–29)
CREAT SERPL-MCNC: 1.05 MG/DL (ref 0.76–1.27)
EOSINOPHIL # BLD AUTO: 0.3 X10E3/UL (ref 0–0.4)
EOSINOPHIL NFR BLD AUTO: 5 %
ERYTHROCYTE [DISTWIDTH] IN BLOOD BY AUTOMATED COUNT: 13.5 % (ref 12.3–15.4)
GLUCOSE SERPL-MCNC: 95 MG/DL (ref 65–99)
HCT VFR BLD AUTO: 38 % (ref 37.5–51)
HGB BLD-MCNC: 13.1 G/DL (ref 13–17.7)
IMM GRANULOCYTES # BLD: 0 X10E3/UL (ref 0–0.1)
IMM GRANULOCYTES NFR BLD: 0 %
LYMPHOCYTES # BLD AUTO: 2.1 X10E3/UL (ref 0.7–3.1)
LYMPHOCYTES NFR BLD AUTO: 31 %
MCH RBC QN AUTO: 32.8 PG (ref 26.6–33)
MCHC RBC AUTO-ENTMCNC: 34.5 G/DL (ref 31.5–35.7)
MCV RBC AUTO: 95 FL (ref 79–97)
MONOCYTES # BLD AUTO: 0.8 X10E3/UL (ref 0.1–0.9)
MONOCYTES NFR BLD AUTO: 11 %
NEUTROPHILS # BLD AUTO: 3.5 X10E3/UL (ref 1.4–7)
NEUTROPHILS NFR BLD AUTO: 53 %
PLATELET # BLD AUTO: 248 X10E3/UL (ref 150–450)
POTASSIUM SERPL-SCNC: 4.3 MMOL/L (ref 3.5–5.2)
RBC # BLD AUTO: 3.99 X10E6/UL (ref 4.14–5.8)
SL AMB EGFR AFRICAN AMERICAN: 84 ML/MIN/1.73
SL AMB EGFR NON AFRICAN AMERICAN: 73 ML/MIN/1.73
SODIUM SERPL-SCNC: 138 MMOL/L (ref 134–144)
WBC # BLD AUTO: 6.7 X10E3/UL (ref 3.4–10.8)

## 2019-09-17 PROCEDURE — 99496 TRANSJ CARE MGMT HIGH F2F 7D: CPT | Performed by: FAMILY MEDICINE

## 2019-09-17 RX ORDER — AMOXICILLIN 500 MG/1
CAPSULE ORAL
Refills: 0 | COMMUNITY
Start: 2019-09-16 | End: 2019-12-01

## 2019-09-17 NOTE — PROGRESS NOTES
Assessment/Plan:    No problem-specific Assessment & Plan notes found for this encounter  awv in future, not with tcm today     Diagnoses and all orders for this visit:    Enlarged prostate without lower urinary tract symptoms (luts)    Other orders  -     amoxicillin (AMOXIL) 500 mg capsule; take 2 capsules by mouth immediately then 1 capsule every 8 hours until finished              Return if symptoms worsen or fail to improve  Subjective:      Patient ID: Jonatan Purcell is a 81 y o  male  Chief Complaint   Patient presents with    Transition of Imtiaz Olp       HPI  Saw Dr Ester Joel retention  Has brown at present  Has BPH  No fevers  u c/s neg  Creatinine back to normal, K normal  Edema resolved    Urology f/u   Dc 5d ago  Has perineal pressure with sitting  Had bladder cramp sensation before    TCM Call (since 8/17/2019)     Date and time call was made  9/13/2019  8:46 AM    Hospital care reviewed  Records reviewed    Patient was hospitialized at  [facility]    Date of Admission  09/10/19    Date of discharge  09/12/19    Diagnosis  Acute urinary retention    Disposition  Home    Were the patients medications reviewed and updated  Yes    Current Symptoms  None      TCM Call (since 8/17/2019)     Post hospital issues  None    Should patient be enrolled in anticoag monitoring? No    Scheduled for follow up?   Yes    Patients specialists  Urologist    Urologist name  Dr Rolando Rosario    Did you obtain your prescribed medications  Yes    Do you need help managing your prescriptions or medications  No    Is transportation to your appointment needed  No    I have advised the patient to call PCP with any new or worsening symptoms  [address] [facility]    The type of support provided  Physical; Emotional    Do you have social support  Yes, some <img src='C:FILES (X86)    Are you recieving any outpatient services  No    Are you recieving home care services  No    Interperter language line needed  No    Counseling  Patient    Counseling topics  Activities of daily living; patient and family education; instructions for management; Risk factor reduction; Importance of RX compliance    Comments  Aurelia Spivey states that he is now on an antibiotc from his dentist for his tooth  He discussed this with Dr Chanda Garnett this am  Other than that, he feels fine  No fever and no complaints at this time  [email or web address]            The following portions of the patient's history were reviewed and updated as appropriate: allergies, current medications, past family history, past medical history, past social history, past surgical history and problem list     Review of Systems   Constitutional: Negative for chills and fever  Current Outpatient Medications   Medication Sig Dispense Refill    fluticasone (FLONASE) 50 mcg/act nasal spray 2 puffs into each nostril daily      tamsulosin (FLOMAX) 0 4 mg Take 0 4 mg by mouth daily with dinner      amoxicillin (AMOXIL) 500 mg capsule take 2 capsules by mouth immediately then 1 capsule every 8 hours until finished  0     No current facility-administered medications for this visit  Objective:    /86   Pulse 78   Temp 97 8 °F (36 6 °C)   Resp 16   Ht 6' 2" (1 88 m)   Wt 90 7 kg (200 lb)   SpO2 98%   BMI 25 68 kg/m²        Physical Exam   Constitutional: He appears well-developed  No distress  HENT:   Head: Normocephalic  Mouth/Throat: No oropharyngeal exudate  Eyes: Conjunctivae are normal  No scleral icterus  Neck: Neck supple  Cardiovascular: Normal rate, regular rhythm, normal heart sounds and intact distal pulses  Pulmonary/Chest: Effort normal and breath sounds normal  No respiratory distress  He has no wheezes  He has no rales  Abdominal: Soft  Bowel sounds are normal  He exhibits no distension and no mass  There is no tenderness  There is no guarding     Musculoskeletal: He exhibits no edema or deformity  Neurological: He is alert  He exhibits normal muscle tone  Skin: Skin is warm and dry  Psychiatric: His behavior is normal  Thought content normal    Nursing note and vitals reviewed               Lalo Sutton DO

## 2019-10-01 NOTE — H&P
History & Physical - [facility]  Sam Joseph 81 y o  male MRN: [id number]  Unit/Bed#:  Encounter: [id number]    ASSESSMENT/PLAN:    Urinary retention   B/L hydroureteronephrosis and bladder distention on CT 09/10/19  Brown placed for 3,800cc of pink tinged urine  Cystoscopy 09/23/19 indicated a 3cm prostate  Hx of back fx Feb 2018 that may contribute to neurologic dysfunction of his bladder  Desire to avoid chronic brown or intermittent catherization  · TURP in hopes of improving his bladder emptying  Pt aware that he may still need a brown after the procedure due to the possibility of a dysfunctional bladder  · The risks, benefits, alternatives,and probabilities of success were discussed in detail with no guarantee made as to outcome  All questions were answered to the patient's satisfaction  HISTORY OF PRESENT ILLNESS:  82 y/o male with urinary retention and hydronephrosis for 3,800ml on 09/10/19  Pt prior to having brown placed having some abdominal distention and urge to urine when bending over  Pt noted he was still able to void 12-14 ounces at a time prior to having his brown placed  Office cystoscopy indicated a 3cm obstructing prostate  Pt currently presents for surgical resection of his prostate in hopes of being able to void closer to completion  PAST MEDICAL HISTORY:  Past Medical History:   Diagnosis Date    Enlarged prostate     Urinary bladder disorder        PAST SURGICAL HISTORY:  Past Surgical History:   Procedure Laterality Date    HERNIA REPAIR         ALLERGIES:  Allergies   Allergen Reactions    Aspirin GI Intolerance     Other reaction(s): GI upset  Reaction Date: [id number];    Other reaction(s): gi complications  Reaction Date: [id number];        SOCIAL HISTORY:  Social History     Substance and Sexual Activity   Alcohol Use Never    Frequency: Never     Social History     Substance and Sexual Activity   Drug Use Never     Social History     Tobacco Use   Smoking Status Former Smoker    Last attempt to quit:     Years since quittin 7   Smokeless Tobacco Never Used       FAMILY HISTORY:  Family History   Problem Relation Age of Onset    Diabetes Mother     Heart failure Father        MEDICATIONS:  No current facility-administered medications for this encounter  Current Outpatient Medications:     amoxicillin (AMOXIL) 500 mg capsule, take 2 capsules by mouth immediately then 1 capsule every 8 hours until finished, Disp: , Rfl: 0    fluticasone (FLONASE) 50 mcg/act nasal spray, 2 puffs into each nostril daily, Disp: , Rfl:     tamsulosin (FLOMAX) 0 4 mg, Take 0 4 mg by mouth daily with dinner, Disp: , Rfl:     Review of Systems    PHYSICAL EXAM:  Physical Exam   Constitutional: He appears well-developed  HENT:   Head: Normocephalic  Pulmonary/Chest: Effort normal    Abdominal: Soft  Neurological: He is alert  Skin: Skin is warm  Psychiatric: He has a normal mood and affect  LAB RESULTS:  Lab Results   Component Value Date    WBC 6 7 2019    HGB 13 1 2019    HCT 38 0 2019    MCV 95 2019     2019     Lab Results   Component Value Date    SODIUM 138 2019    K 4 3 2019    CL 98 2019    CO2 26 2019    BUN 21 2019    CREATININE 1 05 2019    GLUC 95 2019    CALCIUM 8 2 (L) 2019     Lab Results   Component Value Date    CALCIUM 8 2 (L) 2019     Lab Results   Component Value Date    PSA 3 1 2019         Simon Herrmann PA-C  10/01/19    Portions of the record may have been created with voice recognition software   Occasional wrong word or "sound alike" substitutions may have occurred due to the inherent limitations of voice recognition software   Read the chart carefully and recognize, using context, where substitutions have occurred

## 2019-10-04 ENCOUNTER — APPOINTMENT (OUTPATIENT)
Dept: LAB | Facility: HOSPITAL | Age: 69
End: 2019-10-04
Attending: UROLOGY
Payer: MEDICARE

## 2019-10-04 ENCOUNTER — TRANSCRIBE ORDERS (OUTPATIENT)
Dept: ADMINISTRATIVE | Facility: HOSPITAL | Age: 69
End: 2019-10-04
Payer: MEDICARE

## 2019-10-04 ENCOUNTER — APPOINTMENT (OUTPATIENT)
Dept: PREADMISSION TESTING | Facility: HOSPITAL | Age: 69
End: 2019-10-04
Payer: MEDICARE

## 2019-10-04 DIAGNOSIS — Z01.818 OTHER SPECIFIED PRE-OPERATIVE EXAMINATION: ICD-10-CM

## 2019-10-04 DIAGNOSIS — Z01.818 OTHER SPECIFIED PRE-OPERATIVE EXAMINATION: Primary | ICD-10-CM

## 2019-10-04 LAB
ABO GROUP BLD: NORMAL
BACTERIA UR QL AUTO: ABNORMAL /HPF
BILIRUB UR QL STRIP: NEGATIVE
BLD GP AB SCN SERPL QL: NEGATIVE
CLARITY UR: CLEAR
COLOR UR: YELLOW
GLUCOSE UR STRIP-MCNC: NEGATIVE MG/DL
HGB UR QL STRIP.AUTO: ABNORMAL
KETONES UR STRIP-MCNC: NEGATIVE MG/DL
LEUKOCYTE ESTERASE UR QL STRIP: NEGATIVE
MUCOUS THREADS UR QL AUTO: ABNORMAL
NITRITE UR QL STRIP: NEGATIVE
NON-SQ EPI CELLS URNS QL MICRO: ABNORMAL /HPF
PH UR STRIP.AUTO: 7 [PH]
PROT UR STRIP-MCNC: ABNORMAL MG/DL
RBC #/AREA URNS AUTO: ABNORMAL /HPF
RH BLD: POSITIVE
SP GR UR STRIP.AUTO: 1.01 (ref 1–1.03)
SPECIMEN EXPIRATION DATE: NORMAL
UROBILINOGEN UR QL STRIP.AUTO: 0.2 E.U./DL
WBC #/AREA URNS AUTO: ABNORMAL /HPF

## 2019-10-04 PROCEDURE — 86901 BLOOD TYPING SEROLOGIC RH(D): CPT | Performed by: UROLOGY

## 2019-10-04 PROCEDURE — 86850 RBC ANTIBODY SCREEN: CPT | Performed by: UROLOGY

## 2019-10-04 PROCEDURE — 93005 ELECTROCARDIOGRAM TRACING: CPT | Performed by: UROLOGY

## 2019-10-04 PROCEDURE — 86900 BLOOD TYPING SEROLOGIC ABO: CPT | Performed by: UROLOGY

## 2019-10-04 PROCEDURE — 81001 URINALYSIS AUTO W/SCOPE: CPT | Performed by: UROLOGY

## 2019-10-04 PROCEDURE — 36415 COLL VENOUS BLD VENIPUNCTURE: CPT | Performed by: UROLOGY

## 2019-10-04 NOTE — PRE-PROCEDURE INSTRUCTIONS
My Surgical Experience    The following information was developed to assist you to prepare for your operation  What do I need to do before coming to the hospital?   Arrange for a responsible person to drive you to and from the hospital    Arrange care for your children at home  Children are not allowed in the recovery areas of the hospital   Plan to wear clothing that is easy to put on and take off  If you are having shoulder surgery, wear a shirt that buttons or zippers in the front  Bathing  o Shower the evening before and the morning of your surgery with an antibacterial soap  Please refer to the Pre Op Showering Instructions for Surgery Patients Sheet   o Remove nail polish and all body piercing jewelry  o Do not shave any body part for at least 24 hours before surgery-this includes face, arms, legs and upper body  Food  o Nothing to eat or drink after midnight the night before your surgery  This includes candy and chewing gum  o Exception: If your surgery is after 12:00pm (noon), you may have clear liquids such as 7-Up®, ginger ale, apple or cranberry juice, Jell-O®, water, or clear broth until 8:00 am  o Do not drink milk or juice with pulp on the morning before surgery  o Do not drink alcohol 24 hours before surgery  Medicine  o Follow instructions you received from your surgeon about which medicines you may take on the day of surgery  o If instructed to take medicine on the morning of surgery, take pills with just a small sip of water  Call your prescribing doctor for specific infroamtion on what to do if you take insulin    What should I bring to the hospital?    Bring:  Orvil Martinez or a walker, if you have them, for foot or knee surgery   A list of the daily medicines, vitamins, minerals, herbals and nutritional supplements you take   Include the dosages of medicines and the time you take them each day   Glasses, dentures or hearing aids   Minimal clothing; you will be wearing hospital sleepwear   Photo ID; required to verify your identity   If you have a Living Will or Power of , bring a copy of the documents   If you have an ostomy, bring an extra pouch and any supplies you use    Do not bring   Medicines or inhalers   Money, valuables or jewelry    What other information should I know about the day of surgery?  Notify your surgeons if you develop a cold, sore throat, cough, fever, rash or any other illness   Report to the Ambulatory Surgical/Same Day Surgery Unit   You will be instructed to stop at Registration only if you have not been pre-registered   Inform your  fi they do not stay that they will be asked by the staff to leave a phone number where they can be reached   Be available to be reached before surgery  In the event the operating room schedule changes, you may be asked to come in earlier or later than expected    *It is important to tell your doctor and others involved in your health care if you are taking or have been taking any non-prescription drugs, vitamins, minerals, herbals or other nutritional supplements  Any of these may interact with some food or medicines and cause a reaction      Pre-Surgery Instructions:   Medication Instructions    amoxicillin (AMOXIL) 500 mg capsule Instructed patient per Anesthesia Guidelines   fluticasone (FLONASE) 50 mcg/act nasal spray Instructed patient per Anesthesia Guidelines   tamsulosin (FLOMAX) 0 4 mg Instructed patient per Anesthesia Guidelines

## 2019-10-05 LAB
ATRIAL RATE: 61 BPM
P AXIS: 65 DEGREES
PR INTERVAL: 196 MS
QRS AXIS: 66 DEGREES
QRSD INTERVAL: 104 MS
QT INTERVAL: 424 MS
QTC INTERVAL: 426 MS
T WAVE AXIS: 32 DEGREES
VENTRICULAR RATE: 61 BPM

## 2019-10-05 PROCEDURE — 93010 ELECTROCARDIOGRAM REPORT: CPT | Performed by: INTERNAL MEDICINE

## 2019-10-07 ENCOUNTER — ANESTHESIA EVENT (OUTPATIENT)
Dept: PERIOP | Facility: HOSPITAL | Age: 69
End: 2019-10-07
Payer: MEDICARE

## 2019-10-07 NOTE — ANESTHESIA PREPROCEDURE EVALUATION
Review of Systems/Medical History  Patient summary reviewed  Chart reviewed  No history of anesthetic complications     Cardiovascular   Pulmonary  Smoker ex-smoker  ,        GI/Hepatic       Prostatic disorder, benign prostatic hyperplasia       Endo/Other     GYN       Hematology   Musculoskeletal       Neurology   Psychology           Physical Exam    Airway    Mallampati score: II  TM Distance: >3 FB  Neck ROM: full     Dental       Cardiovascular  Rhythm: regular, Rate: normal,     Pulmonary  Breath sounds clear to auscultation,     Other Findings        Anesthesia Plan  ASA Score- 2     Anesthesia Type- general with ASA Monitors  Additional Monitors:   Airway Plan: LMA  Plan Factors-    Induction- intravenous  Postoperative Plan- Plan for postoperative opioid use  Planned trial extubation    Informed Consent- Anesthetic plan and risks discussed with patient  I personally reviewed this patient with the CRNA  Discussed and agreed on the Anesthesia Plan with the CRNA  Beverly Delaney

## 2019-10-08 ENCOUNTER — HOSPITAL ENCOUNTER (OUTPATIENT)
Facility: HOSPITAL | Age: 69
Setting detail: OUTPATIENT SURGERY
Discharge: HOME | End: 2019-10-09
Attending: UROLOGY | Admitting: UROLOGY
Payer: MEDICARE

## 2019-10-08 ENCOUNTER — ANESTHESIA (OUTPATIENT)
Dept: PERIOP | Facility: HOSPITAL | Age: 69
End: 2019-10-08
Payer: MEDICARE

## 2019-10-08 DIAGNOSIS — N40.1 BENIGN PROSTATIC HYPERPLASIA WITH LOWER URINARY TRACT SYMPTOMS: ICD-10-CM

## 2019-10-08 LAB
ABO GROUP BLD: NORMAL
RH BLD: POSITIVE

## 2019-10-08 PROCEDURE — 86900 BLOOD TYPING SEROLOGIC ABO: CPT | Performed by: UROLOGY

## 2019-10-08 PROCEDURE — 86901 BLOOD TYPING SEROLOGIC RH(D): CPT | Performed by: UROLOGY

## 2019-10-08 PROCEDURE — 88305 TISSUE EXAM BY PATHOLOGIST: CPT | Performed by: PATHOLOGY

## 2019-10-08 RX ORDER — SODIUM CHLORIDE, SODIUM LACTATE, POTASSIUM CHLORIDE, CALCIUM CHLORIDE 600; 310; 30; 20 MG/100ML; MG/100ML; MG/100ML; MG/100ML
75 INJECTION, SOLUTION INTRAVENOUS CONTINUOUS
Status: DISCONTINUED | OUTPATIENT
Start: 2019-10-08 | End: 2019-10-08

## 2019-10-08 RX ORDER — TAMSULOSIN HYDROCHLORIDE 0.4 MG/1
0.4 CAPSULE ORAL
Status: DISCONTINUED | OUTPATIENT
Start: 2019-10-09 | End: 2019-10-09 | Stop reason: HOSPADM

## 2019-10-08 RX ORDER — CEFAZOLIN SODIUM 1 G/50ML
1000 SOLUTION INTRAVENOUS EVERY 8 HOURS
Status: COMPLETED | OUTPATIENT
Start: 2019-10-08 | End: 2019-10-09

## 2019-10-08 RX ORDER — GLYCINE 1.5 G/100ML
SOLUTION IRRIGATION AS NEEDED
Status: DISCONTINUED | OUTPATIENT
Start: 2019-10-08 | End: 2019-10-08 | Stop reason: HOSPADM

## 2019-10-08 RX ORDER — FENTANYL CITRATE/PF 50 MCG/ML
50 SYRINGE (ML) INJECTION
Status: DISCONTINUED | OUTPATIENT
Start: 2019-10-08 | End: 2019-10-08 | Stop reason: HOSPADM

## 2019-10-08 RX ORDER — MAGNESIUM HYDROXIDE 1200 MG/15ML
LIQUID ORAL AS NEEDED
Status: DISCONTINUED | OUTPATIENT
Start: 2019-10-08 | End: 2019-10-08 | Stop reason: HOSPADM

## 2019-10-08 RX ORDER — MIDAZOLAM HYDROCHLORIDE 1 MG/ML
INJECTION INTRAMUSCULAR; INTRAVENOUS AS NEEDED
Status: DISCONTINUED | OUTPATIENT
Start: 2019-10-08 | End: 2019-10-08 | Stop reason: SURG

## 2019-10-08 RX ORDER — FENTANYL CITRATE/PF 50 MCG/ML
25 SYRINGE (ML) INJECTION
Status: CANCELLED | OUTPATIENT
Start: 2019-10-08

## 2019-10-08 RX ORDER — ACETAMINOPHEN 325 MG/1
650 TABLET ORAL EVERY 6 HOURS SCHEDULED
Status: DISCONTINUED | OUTPATIENT
Start: 2019-10-08 | End: 2019-10-09 | Stop reason: HOSPADM

## 2019-10-08 RX ORDER — DOCUSATE SODIUM 100 MG/1
100 CAPSULE, LIQUID FILLED ORAL 2 TIMES DAILY
Status: DISCONTINUED | OUTPATIENT
Start: 2019-10-08 | End: 2019-10-09 | Stop reason: HOSPADM

## 2019-10-08 RX ORDER — ONDANSETRON 2 MG/ML
4 INJECTION INTRAMUSCULAR; INTRAVENOUS EVERY 6 HOURS PRN
Status: DISCONTINUED | OUTPATIENT
Start: 2019-10-08 | End: 2019-10-09 | Stop reason: HOSPADM

## 2019-10-08 RX ORDER — DEXAMETHASONE SODIUM PHOSPHATE 4 MG/ML
INJECTION, SOLUTION INTRA-ARTICULAR; INTRALESIONAL; INTRAMUSCULAR; INTRAVENOUS; SOFT TISSUE AS NEEDED
Status: DISCONTINUED | OUTPATIENT
Start: 2019-10-08 | End: 2019-10-08 | Stop reason: SURG

## 2019-10-08 RX ORDER — ONDANSETRON 2 MG/ML
4 INJECTION INTRAMUSCULAR; INTRAVENOUS ONCE AS NEEDED
Status: CANCELLED | OUTPATIENT
Start: 2019-10-08

## 2019-10-08 RX ORDER — PROMETHAZINE HYDROCHLORIDE 25 MG/ML
12.5 INJECTION, SOLUTION INTRAMUSCULAR; INTRAVENOUS ONCE AS NEEDED
Status: DISCONTINUED | OUTPATIENT
Start: 2019-10-08 | End: 2019-10-08 | Stop reason: HOSPADM

## 2019-10-08 RX ORDER — FENTANYL CITRATE 50 UG/ML
INJECTION, SOLUTION INTRAMUSCULAR; INTRAVENOUS AS NEEDED
Status: DISCONTINUED | OUTPATIENT
Start: 2019-10-08 | End: 2019-10-08 | Stop reason: SURG

## 2019-10-08 RX ORDER — FLUTICASONE PROPIONATE 50 MCG
2 SPRAY, SUSPENSION (ML) NASAL
Status: DISCONTINUED | OUTPATIENT
Start: 2019-10-08 | End: 2019-10-09 | Stop reason: HOSPADM

## 2019-10-08 RX ORDER — SODIUM CHLORIDE, SODIUM LACTATE, POTASSIUM CHLORIDE, CALCIUM CHLORIDE 600; 310; 30; 20 MG/100ML; MG/100ML; MG/100ML; MG/100ML
75 INJECTION, SOLUTION INTRAVENOUS CONTINUOUS
Status: DISCONTINUED | OUTPATIENT
Start: 2019-10-08 | End: 2019-10-09 | Stop reason: HOSPADM

## 2019-10-08 RX ORDER — ONDANSETRON 2 MG/ML
INJECTION INTRAMUSCULAR; INTRAVENOUS AS NEEDED
Status: DISCONTINUED | OUTPATIENT
Start: 2019-10-08 | End: 2019-10-08 | Stop reason: SURG

## 2019-10-08 RX ORDER — CEFAZOLIN SODIUM 2 G/50ML
2000 SOLUTION INTRAVENOUS ONCE
Status: COMPLETED | OUTPATIENT
Start: 2019-10-08 | End: 2019-10-08

## 2019-10-08 RX ORDER — MAGNESIUM HYDROXIDE/ALUMINUM HYDROXICE/SIMETHICONE 120; 1200; 1200 MG/30ML; MG/30ML; MG/30ML
30 SUSPENSION ORAL EVERY 6 HOURS PRN
Status: DISCONTINUED | OUTPATIENT
Start: 2019-10-08 | End: 2019-10-09 | Stop reason: HOSPADM

## 2019-10-08 RX ORDER — LIDOCAINE HYDROCHLORIDE 10 MG/ML
INJECTION, SOLUTION INFILTRATION; PERINEURAL AS NEEDED
Status: DISCONTINUED | OUTPATIENT
Start: 2019-10-08 | End: 2019-10-08 | Stop reason: SURG

## 2019-10-08 RX ORDER — ONDANSETRON 2 MG/ML
4 INJECTION INTRAMUSCULAR; INTRAVENOUS ONCE AS NEEDED
Status: DISCONTINUED | OUTPATIENT
Start: 2019-10-08 | End: 2019-10-08 | Stop reason: HOSPADM

## 2019-10-08 RX ORDER — PROPOFOL 10 MG/ML
INJECTION, EMULSION INTRAVENOUS AS NEEDED
Status: DISCONTINUED | OUTPATIENT
Start: 2019-10-08 | End: 2019-10-08 | Stop reason: SURG

## 2019-10-08 RX ORDER — EPHEDRINE SULFATE 50 MG/ML
INJECTION INTRAVENOUS AS NEEDED
Status: DISCONTINUED | OUTPATIENT
Start: 2019-10-08 | End: 2019-10-08 | Stop reason: SURG

## 2019-10-08 RX ORDER — MEPERIDINE HYDROCHLORIDE 25 MG/ML
12.5 INJECTION INTRAMUSCULAR; INTRAVENOUS; SUBCUTANEOUS
Status: DISCONTINUED | OUTPATIENT
Start: 2019-10-08 | End: 2019-10-08 | Stop reason: HOSPADM

## 2019-10-08 RX ORDER — ATROPA BELLADONNA AND OPIUM 16.2; 3 MG/1; MG/1
30 SUPPOSITORY RECTAL EVERY 8 HOURS PRN
Status: DISCONTINUED | OUTPATIENT
Start: 2019-10-08 | End: 2019-10-09 | Stop reason: HOSPADM

## 2019-10-08 RX ORDER — OXYCODONE HYDROCHLORIDE AND ACETAMINOPHEN 5; 325 MG/1; MG/1
1 TABLET ORAL EVERY 4 HOURS PRN
Status: DISCONTINUED | OUTPATIENT
Start: 2019-10-08 | End: 2019-10-09 | Stop reason: HOSPADM

## 2019-10-08 RX ADMIN — FENTANYL CITRATE 25 MCG: 50 INJECTION, SOLUTION INTRAMUSCULAR; INTRAVENOUS at 08:12

## 2019-10-08 RX ADMIN — SODIUM CHLORIDE, SODIUM LACTATE, POTASSIUM CHLORIDE, AND CALCIUM CHLORIDE 75 ML/HR: .6; .31; .03; .02 INJECTION, SOLUTION INTRAVENOUS at 07:10

## 2019-10-08 RX ADMIN — FLUTICASONE PROPIONATE 2 SPRAY: 50 SPRAY, METERED NASAL at 23:02

## 2019-10-08 RX ADMIN — PROPOFOL 50 MG: 10 INJECTION, EMULSION INTRAVENOUS at 09:03

## 2019-10-08 RX ADMIN — ONDANSETRON 4 MG: 2 INJECTION INTRAMUSCULAR; INTRAVENOUS at 08:00

## 2019-10-08 RX ADMIN — FENTANYL CITRATE 50 MCG: 50 INJECTION, SOLUTION INTRAMUSCULAR; INTRAVENOUS at 07:51

## 2019-10-08 RX ADMIN — SODIUM CHLORIDE, SODIUM LACTATE, POTASSIUM CHLORIDE, AND CALCIUM CHLORIDE 75 ML/HR: .6; .31; .03; .02 INJECTION, SOLUTION INTRAVENOUS at 12:13

## 2019-10-08 RX ADMIN — LIDOCAINE HYDROCHLORIDE 50 MG: 10 INJECTION, SOLUTION INFILTRATION; PERINEURAL at 07:51

## 2019-10-08 RX ADMIN — EPHEDRINE SULFATE 10 MG: 50 INJECTION, SOLUTION INTRAVENOUS at 07:59

## 2019-10-08 RX ADMIN — CEFAZOLIN SODIUM 2000 MG: 2 SOLUTION INTRAVENOUS at 07:47

## 2019-10-08 RX ADMIN — PROPOFOL 150 MG: 10 INJECTION, EMULSION INTRAVENOUS at 07:51

## 2019-10-08 RX ADMIN — FENTANYL CITRATE 25 MCG: 50 INJECTION, SOLUTION INTRAMUSCULAR; INTRAVENOUS at 08:11

## 2019-10-08 RX ADMIN — SODIUM CHLORIDE, SODIUM LACTATE, POTASSIUM CHLORIDE, AND CALCIUM CHLORIDE: .6; .31; .03; .02 INJECTION, SOLUTION INTRAVENOUS at 08:50

## 2019-10-08 RX ADMIN — MIDAZOLAM HYDROCHLORIDE 2 MG: 1 INJECTION, SOLUTION INTRAMUSCULAR; INTRAVENOUS at 07:47

## 2019-10-08 RX ADMIN — SODIUM CHLORIDE, SODIUM LACTATE, POTASSIUM CHLORIDE, AND CALCIUM CHLORIDE 75 ML/HR: .6; .31; .03; .02 INJECTION, SOLUTION INTRAVENOUS at 21:09

## 2019-10-08 RX ADMIN — DEXAMETHASONE SODIUM PHOSPHATE 4 MG: 4 INJECTION, SOLUTION INTRA-ARTICULAR; INTRALESIONAL; INTRAMUSCULAR; INTRAVENOUS; SOFT TISSUE at 08:00

## 2019-10-08 RX ADMIN — FENTANYL CITRATE 25 MCG: 50 INJECTION, SOLUTION INTRAMUSCULAR; INTRAVENOUS at 08:52

## 2019-10-08 RX ADMIN — CEFAZOLIN SODIUM 1000 MG: 1 SOLUTION INTRAVENOUS at 21:09

## 2019-10-08 RX ADMIN — ACETAMINOPHEN 650 MG: 325 TABLET, FILM COATED ORAL at 21:24

## 2019-10-08 NOTE — OP NOTE
OPERATIVE REPORT- Dr Wheatley Memory  PATIENT NAME: Gato Wyman    :  1950  MRN: [id number]  Pt Location: [facility] OR ROOM 04    SURGERY DATE: 10/8/2019    Surgeon: Griselda Albe, MD    Pre-op Diagnosis:  1  BPH with obstruction  2  Retention of over 3 liters with bilateral hydro    Post-op Diagnosis:  1  Same    Procedure:  1  Transurethral resection of prostate    Specimen(s):   ID Type Source Tests Collected by Time Destination   1 : prostate chips Tissue Prostate TISSUE EXAM Griselda Albe, MD 10/8/2019 0806        Estimated Blood Loss: 968 mL    Complications: None    Drains:  1  22 Fr 3 way brown with continuous irrigation of normal saline    Anesthesia type: General    Indications for surgery:  1  Retention    Findings:  1  Sever BPH mostly median lobe    Procedure and Technique:   After obtaining consent and identifying the patient, antibiotics were given as ordered and the patient was brought to the room  All appropriate leads and monitors were placed and the patient was appropriately positioned on the table  Anesthesia was administered and the patient was sterilely prepped and draped  A timeout was performed where the patient name, , procedure, antibiotics, allergies, etc were discussed  All in the room were satisfied before the start of the operative procedure  What follows are the operative findings and events  The 28 Macedonian resectoscope sheath with continuous flow was introduced with the Patrice obturator  Continuous flow of glycine was used during the procedure  Inspection of the bladder revealed no significant findings  Resection was initiated at the 6:00 position working toward the verumontanum  Care was taken not to injure the ejaculatory ducts or the external sphincter  Thereafter the left prostate lobe was resected starting at the 5:00 position at the bladder neck working clockwise around to the 6:00 position out toward the verumontanum   Any localized bleeding was controlled with electrocautery  A similar process was then completed on the patient's right side starting at 10:00 working counterclockwise to 6:00  Finally the anterior aspect of the prostate was resected from the bladder neck carefully out towards the external sphincter, frequently checking to make sure that I was proximal to the sphincter  Again hemostasis was obtained with electrocautery  Prostate chips were irrigated free from the bladder  Repeat evaluation of hemostasis was performed and electrocautery was then used for final hemostasis  The bladder was reinspected with no significant findings noted  The ureteral orifices were visualized and uninjured during the procedure  No chips remained in the bladder  Therefore the bladder was filled and the resectoscope was withdrawn  Immediately a 25 Sammarinese three-way catheter was placed, the balloon inflated and hand irrigation performed to confirm position and ease of irrigation  There was no flow so the catheter was replaced and flushed  There were tiny half-chips x 2 removed then irrigation ran fine  Once this was completed a continuous irrigation of normal saline was established  He was awakened from anesthesia having tolerated the procedure well, and transferred to the recovery room in satisfactory condition  Plan:  He will be observed overnight and a voiding trial will be instituted if the urine is clear tomorrow  SIGNATURE: Dallin Jaime MD  DATE: October 8, 2019  TIME: 10:20 AM    Portions of the record may have been created with voice recognition software   Occasional wrong word or "sound alike" substitutions may have occurred due to the inherent limitations of voice recognition software   Read the chart carefully and recognize, using context, where substitutions have occurred

## 2019-10-08 NOTE — PLAN OF CARE
Problem: PAIN - ADULT  Goal: Verbalizes/displays adequate comfort level or baseline comfort level  Description  Interventions:  - Encourage patient to monitor pain and request assistance  - Assess pain using appropriate pain scale  - Administer analgesics based on type and severity of pain and evaluate response  - Implement non-pharmacological measures as appropriate and evaluate response  - Consider cultural and social influences on pain and pain management  - Notify physician/advanced practitioner if interventions unsuccessful or patient reports new pain  Outcome: Progressing     Problem: INFECTION - ADULT  Goal: Absence or prevention of progression during hospitalization  Description  INTERVENTIONS:  - Assess and monitor for signs and symptoms of infection  - Monitor lab/diagnostic results  - Monitor all insertion sites, i e  indwelling lines, tubes, and drains  - Monitor endotracheal if appropriate and nasal secretions for changes in amount and color  - [place] appropriate cooling/warming therapies per order  - Administer medications as ordered  - Instruct and encourage patient and family to use good hand hygiene technique  - Identify and instruct in appropriate isolation precautions for identified infection/condition  Outcome: Progressing     Problem: SAFETY ADULT  Goal: Patient will remain free of falls  Description  INTERVENTIONS:  - Assess patient frequently for physical needs  -  Identify cognitive and physical deficits and behaviors that affect risk of falls    -  [place] fall precautions as indicated by assessment   - Educate patient/family on patient safety including physical limitations  - Instruct patient to call for assistance with activity based on assessment  - Modify environment to reduce risk of injury  - Consider OT/PT consult to assist with strengthening/mobility  Outcome: Progressing     Problem: DISCHARGE PLANNING  Goal: Discharge to home or other facility with appropriate resources  Description  INTERVENTIONS:  - Identify barriers to discharge w/patient and caregiver  - Arrange for needed discharge resources and transportation as appropriate  - Identify discharge learning needs (meds, wound care, etc )  - Arrange for interpretive services to assist at discharge as needed  - Refer to Case Management Department for coordinating discharge planning if the patient needs post-hospital services based on physician/advanced practitioner order or complex needs related to functional status, cognitive ability, or social support system  Outcome: Progressing     Problem: Knowledge Deficit  Goal: Patient/family/caregiver demonstrates understanding of disease process, treatment plan, medications, and discharge instructions  Description  Complete learning assessment and assess knowledge base    Interventions:  - Provide teaching at level of understanding  - Provide teaching via preferred learning methods  Outcome: Progressing

## 2019-10-09 VITALS
RESPIRATION RATE: 18 BRPM | WEIGHT: 201.94 LBS | DIASTOLIC BLOOD PRESSURE: 83 MMHG | HEART RATE: 77 BPM | OXYGEN SATURATION: 97 % | SYSTOLIC BLOOD PRESSURE: 125 MMHG | HEIGHT: 74 IN | BODY MASS INDEX: 25.92 KG/M2 | TEMPERATURE: 98.2 F

## 2019-10-09 LAB
ANION GAP SERPL CALCULATED.3IONS-SCNC: 7 MMOL/L (ref 4–13)
BUN SERPL-MCNC: 14 MG/DL (ref 5–25)
CALCIUM SERPL-MCNC: 8.6 MG/DL (ref 8.3–10.1)
CHLORIDE SERPL-SCNC: 105 MMOL/L (ref 100–108)
CO2 SERPL-SCNC: 27 MMOL/L (ref 21–32)
CREAT SERPL-MCNC: 0.92 MG/DL (ref 0.6–1.3)
ERYTHROCYTE [DISTWIDTH] IN BLOOD BY AUTOMATED COUNT: 13.4 % (ref 11.6–15.1)
GFR SERPL CREATININE-BSD FRML MDRD: 86 ML/MIN/1.73SQ M
GLUCOSE SERPL-MCNC: 107 MG/DL (ref 65–140)
HCT VFR BLD AUTO: 35.4 % (ref 36.5–49.3)
HGB BLD-MCNC: 11.4 G/DL (ref 12–17)
MCH RBC QN AUTO: 32.5 PG (ref 26.8–34.3)
MCHC RBC AUTO-ENTMCNC: 32.2 G/DL (ref 31.4–37.4)
MCV RBC AUTO: 101 FL (ref 82–98)
PLATELET # BLD AUTO: 182 THOUSANDS/UL (ref 149–390)
PMV BLD AUTO: 10 FL (ref 8.9–12.7)
POTASSIUM SERPL-SCNC: 3.8 MMOL/L (ref 3.5–5.3)
RBC # BLD AUTO: 3.51 MILLION/UL (ref 3.88–5.62)
SODIUM SERPL-SCNC: 139 MMOL/L (ref 136–145)
WBC # BLD AUTO: 7.93 THOUSAND/UL (ref 4.31–10.16)

## 2019-10-09 PROCEDURE — 80048 BASIC METABOLIC PNL TOTAL CA: CPT | Performed by: UROLOGY

## 2019-10-09 PROCEDURE — 85027 COMPLETE CBC AUTOMATED: CPT | Performed by: UROLOGY

## 2019-10-09 RX ORDER — ECHINACEA PURPUREA EXTRACT 125 MG
1 TABLET ORAL AS NEEDED
Status: DISCONTINUED | OUTPATIENT
Start: 2019-10-09 | End: 2019-10-09 | Stop reason: HOSPADM

## 2019-10-09 RX ORDER — TAMSULOSIN HYDROCHLORIDE 0.4 MG/1
0.4 CAPSULE ORAL ONCE
Status: COMPLETED | OUTPATIENT
Start: 2019-10-09 | End: 2019-10-09

## 2019-10-09 RX ADMIN — SODIUM CHLORIDE, SODIUM LACTATE, POTASSIUM CHLORIDE, AND CALCIUM CHLORIDE 75 ML/HR: .6; .31; .03; .02 INJECTION, SOLUTION INTRAVENOUS at 13:23

## 2019-10-09 RX ADMIN — ACETAMINOPHEN 650 MG: 325 TABLET, FILM COATED ORAL at 06:17

## 2019-10-09 RX ADMIN — SALINE NASAL SPRAY 1 SPRAY: 1.5 SOLUTION NASAL at 15:40

## 2019-10-09 RX ADMIN — TAMSULOSIN HYDROCHLORIDE 0.4 MG: 0.4 CAPSULE ORAL at 15:40

## 2019-10-09 RX ADMIN — SODIUM CHLORIDE, SODIUM LACTATE, POTASSIUM CHLORIDE, AND CALCIUM CHLORIDE 75 ML/HR: .6; .31; .03; .02 INJECTION, SOLUTION INTRAVENOUS at 00:34

## 2019-10-09 RX ADMIN — TAMSULOSIN HYDROCHLORIDE 0.4 MG: 0.4 CAPSULE ORAL at 10:55

## 2019-10-09 RX ADMIN — CEFAZOLIN SODIUM 1000 MG: 1 SOLUTION INTRAVENOUS at 05:08

## 2019-10-09 NOTE — PROGRESS NOTES
Progress Note - Urology   Sonja Kunz 81 y o  male MRN: [id number]  Unit/Bed#: [address] [place] [address] Encounter: [id number]    Assessment/Plan:  Urine retention  He had massive bladder distention when the Griffin catheter was placed several weeks ago  The question is whether his bladder function has returned significantly if ever to allow him to be without a catheter  We will attempt a voiding trial today if the hematuria is minimal   · Hold irrigation, check repeatedly to see if hematuria has resolved  · Remove Griffin catheter if hematuria resolves  · Start bladder scanning protocol once catheter is removed  Subjective:  Seen on rounds and notes the following:  Intermittent bleeding overnight  No pain  No clots  Objective:  Vitals Last 24 hours:   Temp:  [95 9 °F (35 5 °C)-99 °F (37 2 °C)] 98 1 °F (36 7 °C)  HR:  [55-82] 62  Resp:  [18-20] 20  BP: (105-127)/(66-91) 127/81  BMI: Body mass index is 25 93 kg/m²  Physical Exam   Constitutional: He is oriented to person, place, and time  He appears well-developed  HENT:   Head: Normocephalic  Cardiovascular: Normal rate and regular rhythm  Pulmonary/Chest: Effort normal  No respiratory distress  Abdominal: He exhibits no distension  There is no guarding  Genitourinary:   Genitourinary Comments: Griffin catheter is present draining almost clear urine with minimal irrigation but pinks-up with no irrigation  Musculoskeletal: He exhibits no edema or tenderness  Lower extremity sequentials in place   Neurological: He is alert and oriented to person, place, and time  Skin: Skin is warm and dry         Drains:   [de-identified] [place] three way Griffin     Lab Results and Cultures:   Results from last 7 days   Lab Units 10/09/19  0540 10/09/19  0539   WBC Thousand/uL  --  7 93   RBC Million/uL  --  3 51*   HEMOGLOBIN g/dL  --  11 4*   HEMATOCRIT %  --  35 4*   PLATELETS Thousands/uL  --  182   POTASSIUM mmol/L 3 8  --    CHLORIDE mmol/L 105  --    CO2 mmol/L 27  -- BUN mg/dL 14  --    CREATININE mg/dL 0 92  --    CALCIUM mg/dL 8 6  --    EGFR ml/min/1 73sq m 86  --              Results from last 7 days   Lab Units 10/04/19  0956   COLOR UA  Yellow   CLARITY UA  Clear   SPEC GRAV UA  1 015   PH UA  7 0   LEUKOCYTES UA  Negative   NITRITE UA  Negative   GLUCOSE UA mg/dl Negative   KETONES UA mg/dl Negative   UROBILINOGEN UA E U /dl 0 2   BILIRUBIN UA  Negative   BLOOD UA  Moderate*       Lab Results   Component Value Date    URINECX No Growth <1000 cfu/mL 09/10/2019         Intake/Output Summary (Last 24 hours) at 10/9/2019 0806  Last data filed at 10/9/2019 0618  Gross per 24 hour   Intake 3187 5 ml   Output 11740 ml   Net -54377 5 ml       Imaging:  None    Chani Suggs MD  10/9/2019    Portions of the record may have been created with voice recognition software   Occasional wrong word or "sound alike" substitutions may have occurred due to the inherent limitations of voice recognition software   Read the chart carefully and recognize, using context, where substitutions have occurred

## 2019-10-09 NOTE — PLAN OF CARE
Problem: INFECTION - ADULT  Goal: Absence or prevention of progression during hospitalization  Description  INTERVENTIONS:  - Assess and monitor for signs and symptoms of infection  - Monitor lab/diagnostic results  - Monitor all insertion sites, i e  indwelling lines, tubes, and drains  - Monitor endotracheal if appropriate and nasal secretions for changes in amount and color  - [place] appropriate cooling/warming therapies per order  - Administer medications as ordered  - Instruct and encourage patient and family to use good hand hygiene technique  - Identify and instruct in appropriate isolation precautions for identified infection/condition  Outcome: Progressing  2 IV abx  Problem: SAFETY ADULT  Goal: Patient will remain free of falls  Description  INTERVENTIONS:  - Assess patient frequently for physical needs  -  Identify cognitive and physical deficits and behaviors that affect risk of falls    -  [place] fall precautions as indicated by assessment   - Educate patient/family on patient safety including physical limitations  - Instruct patient to call for assistance with activity based on assessment  - Modify environment to reduce risk of injury  - Consider OT/PT consult to assist with strengthening/mobility  Outcome: Progressing   Pt to call for assistance  Problem: DISCHARGE PLANNING  Goal: Discharge to home or other facility with appropriate resources  Description  INTERVENTIONS:  - Identify barriers to discharge w/patient and caregiver  - Arrange for needed discharge resources and transportation as appropriate  - Identify discharge learning needs (meds, wound care, etc )  - Arrange for interpretive services to assist at discharge as needed  - Refer to Case Management Department for coordinating discharge planning if the patient needs post-hospital services based on physician/advanced practitioner order or complex needs related to functional status, cognitive ability, or social support system  Outcome: Progressing   education

## 2019-10-09 NOTE — PHYSICIAN ADVISOR
Current patient class: Outpatient Surgery  The patient is currently on Hospital Day: 2 at [address] [place] [address]        The patient was admitted to the hospital  on N/A at N/A for the following diagnosis:  Benign prostatic hyperplasia with lower urinary tract symptoms [N40 1]     After review of the relevant documentation, labs, vital signs and test results, the patient is most appropriate for outpatient no charge bed  Rationale is as follows: The patient is a 81 yrs   Male who presented to the OR at 10/8/2019  6:09 AM with a chief complaint of urinary retention  The patient had a trans urethral resection of the prostate  Patient was admitted and had a normal postoperative course  The patient has a Griffin taken out right now and is just doing a regular voiding trial   Patient does have a little intermittent bleeding overnight  Nothing active is being done right now besides getting some IV hydration  This appears to be normal postop course as above  Patient is most appropriate for outpatient no charge bed      The patients vitals on arrival were ED Triage Vitals   Temperature Pulse Respirations Blood Pressure SpO2   10/08/19 0657 10/08/19 0657 10/08/19 0657 10/08/19 0657 10/08/19 0657   97 8 °F (36 6 °C) 70 18 126/73 98 %      Temp Source Heart Rate Source Patient Position - Orthostatic VS BP Location FiO2 (%)   10/08/19 0657 10/08/19 0657 10/08/19 2036 10/08/19 2036 --   Tympanic Monitor Sitting Right arm       Pain Score       10/08/19 1147       (S) No Pain           Past Medical History:   Diagnosis Date    Cancer [facility]) 2011    melanoma of skin of left shoulder    Colon polyp     Dental abscess     started  4 weeks ago  on RX    Enlarged prostate     Urinary bladder disorder      Past Surgical History:   Procedure Laterality Date    COLONOSCOPY      HERNIA REPAIR      MASS EXCISION      melanoma of left shoulder     [place] TRANSURETHRAL ELEC-SURG PROSTATECTOM N/A 10/8/2019 Procedure: CYSTOSCOPY, TRANSURETHRAL RESECTION OF PROSTATE (TURP); Surgeon: Lyn Monique MD;  Location: [address] [place] [address];  Service: Urology    TONSILLECTOMY             Consults have been placed to:   None    Vitals:    10/09/19 0319 10/09/19 0752 10/09/19 0905 10/09/19 1557   BP: 105/66 127/81  125/83   BP Location:       Pulse: 67 62  77   Resp: 20   18   Temp: 97 8 °F (36 6 °C) 98 1 °F (36 7 °C)  98 2 °F (36 8 °C)   TempSrc: Oral      SpO2: 95% 98% 98% 97%   Weight:       Height:           Most recent labs:    Recent Labs     10/09/19  0539 10/09/19  0540   WBC 7 93  --    HGB 11 4*  --    HCT 35 4*  --      --    K  --  3 8   CALCIUM  --  8 6   BUN  --  14   CREATININE  --  0 92       Scheduled Meds:  Current Facility-Administered Medications:  acetaminophen 650 mg Oral Q6H Eloisa Mendes MD    aluminum-magnesium hydroxide-simethicone 30 mL Oral Q6H PRN Lyn Monique MD    belladonna-opium 30 mg Rectal Q8H PRN Lyn Monique MD    docusate sodium 100 mg Oral BID Lyn Monique MD    fluticasone 2 spray Nasal HS Lyn Monique MD    lactated ringers 75 mL/hr Intravenous Continuous Lyn Monique MD Last Rate: 75 mL/hr (10/09/19 1323)   ondansetron 4 mg Intravenous Q6H PRN Lyn Monique MD    oxyCODONE-acetaminophen 1 tablet Oral Q4H PRN Lyn Monique MD    sodium chloride 1 spray Each Nare PRN Lyn Monique MD    tamsulosin 0 4 mg Oral Daily With Sumeet Mary MD      Continuous Infusions:  lactated ringers 75 mL/hr Last Rate: 75 mL/hr (10/09/19 1323)     PRN Meds: aluminum-magnesium hydroxide-simethicone    belladonna-opium    ondansetron    oxyCODONE-acetaminophen    sodium chloride   Current patient class:  Outpatient Surgery  The patient is currently on Hospital Day: 2 at [address] [place] [address]        The patient was admitted to the hospital  on N/A at N/A for the following diagnosis:  Benign prostatic hyperplasia with lower urinary tract symptoms [N40 1]     After review of the relevant documentation, labs, vital signs and test results, the patient is most appropriate for OBSERVATION STATUS  Rationale is as follows: The patient is a 81 yrs   Male who presented to the ED at 10/8/2019  6:09 AM with a chief complaint of No chief complaint on file  The patients vitals on arrival were ED Triage Vitals   Temperature Pulse Respirations Blood Pressure SpO2   10/08/19 0657 10/08/19 0657 10/08/19 0657 10/08/19 0657 10/08/19 0657   97 8 °F (36 6 °C) 70 18 126/73 98 %      Temp Source Heart Rate Source Patient Position - Orthostatic VS BP Location FiO2 (%)   10/08/19 0657 10/08/19 0657 10/08/19 2036 10/08/19 2036 --   Tympanic Monitor Sitting Right arm       Pain Score       10/08/19 1147       (S) No Pain           Past Medical History:   Diagnosis Date    Cancer [facility]) 2011    melanoma of skin of left shoulder    Colon polyp     Dental abscess     started  4 weeks ago  on RX    Enlarged prostate     Urinary bladder disorder      Past Surgical History:   Procedure Laterality Date    COLONOSCOPY      HERNIA REPAIR      MASS EXCISION      melanoma of left shoulder     [place] TRANSURETHRAL ELEC-SURG PROSTATECTOM N/A 10/8/2019    Procedure: CYSTOSCOPY, TRANSURETHRAL RESECTION OF PROSTATE (TURP);   Surgeon: Jerica Crabtree MD;  Location: [address] [place] [address];  Service: Urology    TONSILLECTOMY             Consults have been placed to:   None    Vitals:    10/09/19 0319 10/09/19 0752 10/09/19 0905 10/09/19 1557   BP: 105/66 127/81  125/83   BP Location:       Pulse: 67 62  77   Resp: 20   18   Temp: 97 8 °F (36 6 °C) 98 1 °F (36 7 °C)  98 2 °F (36 8 °C)   TempSrc: Oral      SpO2: 95% 98% 98% 97%   Weight:       Height:           Most recent labs:    Recent Labs     10/09/19  0539 10/09/19  0540   WBC 7 93  --    HGB 11 4*  --    HCT 35 4*  --      --    K  --  3 8   CALCIUM  --  8 6   BUN  --  14   CREATININE  --  0 92       Scheduled Meds:  Current Facility-Administered Medications:  acetaminophen 650 mg Oral Q6H [address] Victorino Holt MD    aluminum-magnesium hydroxide-simethicone 30 mL Oral Q6H PRN Victorino Holt MD    belladonna-opium 30 mg Rectal Q8H PRN Victorino Holt MD    docusate sodium 100 mg Oral BID Victorino Holt MD    fluticasone 2 spray Nasal HS Victorino Holt MD    lactated ringers 75 mL/hr Intravenous Continuous Victorino Holt MD Last Rate: 75 mL/hr (10/09/19 1323)   ondansetron 4 mg Intravenous Q6H PRN Victorino Holt MD    oxyCODONE-acetaminophen 1 tablet Oral Q4H PRN Victorino Holt MD    sodium chloride 1 spray Each Nare PRN Victorino Holt MD    tamsulosin 0 4 mg Oral Daily With Jonathan Flowers MD      Continuous Infusions:  lactated ringers 75 mL/hr Last Rate: 75 mL/hr (10/09/19 1323)     PRN Meds: aluminum-magnesium hydroxide-simethicone    belladonna-opium    ondansetron    oxyCODONE-acetaminophen    sodium chloride

## 2019-10-09 NOTE — DISCHARGE SUMMARY
Discharge Summary - Cassius Jarquin 81 y o  male MRN: [id number]    Unit/Bed#: [address] Encounter: [id number]    Admission Date: 10/8/2019     Discharge Date: 10/09/19    Attending:Bharathi Montes MD     Consultants:     Admitting Diagnosis: Benign prostatic hyperplasia with lower urinary tract symptoms [N40 1]    Principle/ Secondary Diagnosis:  Past Medical History:   Diagnosis Date    Cancer [facility]) 2011    melanoma of skin of left shoulder    Colon polyp     Dental abscess     started  4 weeks ago  on RX    Enlarged prostate     Urinary bladder disorder      Past Surgical History:   Procedure Laterality Date    COLONOSCOPY      HERNIA REPAIR      MASS EXCISION      melanoma of left shoulder     [place] TRANSURETHRAL ELEC-SURG PROSTATECTOM N/A 10/8/2019    Procedure: CYSTOSCOPY, TRANSURETHRAL RESECTION OF PROSTATE (TURP); Surgeon: Jada Gayle MD;  Location: [facility];  Service: Urology    TONSILLECTOMY         Procedures Performed: No orders of the defined types were placed in this encounter      ME TRANSURETHRAL ELEC-SURG PROSTATECTOM [00318] (CYSTOSCOPY, TRANSURETHRAL RESECTION OF PROSTATE (TURP))  CYSTOSCOPY, TRANSURETHRAL RESECTION OF PROSTATE (TURP) (N/A Abdomen)  Procedure(s):  CYSTOSCOPY, TRANSURETHRAL RESECTION OF PROSTATE (TURP)    Laboratory data at discharge:   Recent Results (from the past 36 hour(s))   ABO/Rh    Collection Time: 10/08/19  6:55 AM   Result Value Ref Range    ABO Grouping O     Rh Factor Positive    CBC and Platelet    Collection Time: 10/09/19  5:39 AM   Result Value Ref Range    WBC 7 93 4 31 - 10 16 Thousand/uL    RBC 3 51 (L) 3 88 - 5 62 Million/uL    Hemoglobin 11 4 (L) 12 0 - 17 0 g/dL    Hematocrit 35 4 (L) 36 5 - 49 3 %     (H) 82 - 98 fL    MCH 32 5 26 8 - 34 3 pg    MCHC 32 2 31 4 - 37 4 g/dL    RDW 13 4 11 6 - 15 1 %    Platelets 891 977 - 451 Thousands/uL    MPV 10 0 8 9 - 12 7 fL   Basic Metabolic Panel    Collection Time: 10/09/19  5:40 AM   Result Value Ref Range    Sodium 139 136 - 145 mmol/L    Potassium 3 8 3 5 - 5 3 mmol/L    Chloride 105 100 - 108 mmol/L    CO2 27 21 - 32 mmol/L    ANION GAP 7 4 - 13 mmol/L    BUN 14 5 - 25 mg/dL    Creatinine 0 92 0 60 - 1 30 mg/dL    Glucose 107 65 - 140 mg/dL    Calcium 8 6 8 3 - 10 1 mg/dL    eGFR 86 ml/min/1 73sq m        Results from last 7 days   Lab Units 10/09/19  0539   WBC Thousand/uL 7 93   HEMOGLOBIN g/dL 11 4*   HEMATOCRIT % 35 4*   PLATELETS Thousands/uL 182     Results from last 7 days   Lab Units 10/09/19  0540   POTASSIUM mmol/L 3 8   CHLORIDE mmol/L 105   CO2 mmol/L 27   BUN mg/dL 14   CREATININE mg/dL 0 92   CALCIUM mg/dL 8 6             Discharge instructions/Information to patient and family:   See after visit summary for information provided to patient and family  Discharge Medications:  See after visit summary for reconciled discharge medications provided to patient and family  Hospital Course: 80 y/o male with urinary retention and obstructive BPH noted on cystoscopy  Underwent cystoscopy 10/08/19 and brown removed 10/09/19  PT had bladder filled prior to brown removal   Voided 200ml and then bladder scanned for 197ml  Pt then voided an additional 200ml  Nursing noted that urine color was clear  Condition at Discharge: Good     Provisions for Follow-Up Care:  See after visit summary for information related to follow-up care and any pertinent home health orders  Disposition: Home    Planned Readmission: No    Discharge Statement   I spent 20 minutes discharging the patient  This time was spent on the day of discharge  I had direct contact with the patient on the day of discharge  Additional documentation is required if more than 30 minutes were spent on discharge       Portions of the record may have been created with voice recognition software   Occasional wrong word or "sound alike" substitutions may have occurred due to the inherent limitations of voice recognition software   Read the chart carefully and recognize, using context, where substitutions have occurred

## 2019-10-09 NOTE — UTILIZATION REVIEW
Assessment/Plan:       Diagnoses and all orders for this visit:    Acute conjunctivitis of both eyes, unspecified acute conjunctivitis type            Subjective:      Patient ID: Galilea Haque is a 66 y o  female  Eye Pain    Both eyes are affected  This is a new problem  The current episode started yesterday  The problem occurs constantly  The problem has been waxing and waning  There was no injury mechanism  The pain is mild  She wears contacts  Associated symptoms include blurred vision, an eye discharge, eye redness, a foreign body sensation, itching and photophobia  Pertinent negatives include no double vision, fever, nausea, recent URI or vomiting  She has tried nothing for the symptoms  The following portions of the patient's history were reviewed and updated as appropriate:   She has a past medical history of Colitis  ,  does not have any pertinent problems on file  ,   has a past surgical history that includes Breast surgery  ,  family history includes Breast cancer in her maternal aunt, maternal grandmother, mother, paternal aunt, and paternal grandmother; Stroke in her maternal grandfather  ,   reports that she has been smoking  She has been smoking about 0 25 packs per day  She has quit using smokeless tobacco  She reports that she drinks alcohol  She reports that she does not use drugs  ,  is allergic to iodinated diagnostic agents and penicillins     Current Outpatient Medications   Medication Sig Dispense Refill    albuterol (PROVENTIL HFA,VENTOLIN HFA) 90 mcg/act inhaler Inhale 2 puffs 4 (four) times a day 8 g 0    benzonatate (TESSALON PERLES) 100 mg capsule Take 1 capsule (100 mg total) by mouth 3 (three) times a day as needed for cough 30 capsule 0    brompheniramine-pseudoephedrine-DM 30-2-10 MG/5ML syrup Take 5 mL by mouth 4 (four) times a day as needed for cough (Patient not taking: Reported on 7/14/2019) 118 mL 0    cholecalciferol (VITAMIN D3) 1,000 units tablet Take 1 tablet (1,000 Initial Clinical Review    Elective OUTPATIENT surgical procedure  Age/Sex: 81 y o  male  Surgery Date: 10/8/19  Procedure: TURP  Anesthesia: GENERAL  Operative Findings: Sever BPH mostly median lobe  Admission Orders: Date/Time/Statement: OUTPATIENT NO CHARGE BED 10/8/19  Vital Signs: /81   Pulse 62   Temp 98 1 °F (36 7 °C)   Resp 20   Ht 6' 2" (1 88 m)   Wt 91 6 kg (201 lb 15 1 oz)   SpO2 98%   BMI 25 93 kg/m²   Diet: CARB CONTROLLED  Mobility: STRICT BEDREST  DVT Prophylaxis: VENODYNES  Medications/Pain Control:   acetaminophen 650 mg Oral Q6H [facility]   aluminum-magnesium hydroxide-simethicone 30 mL Oral Q6H PRN   belladonna-opium 30 mg Rectal Q8H PRN   docusate sodium 100 mg Oral BID   fluticasone 2 spray Nasal HS   lactated ringers 75 mL/hr Intravenous Continuous   ondansetron 4 mg Intravenous Q6H PRN   oxyCODONE-acetaminophen 1 tablet Oral Q4H PRN   sodium chloride 1 spray Each Nare PRN   tamsulosin 0 4 mg Oral Daily With [facility] Utilization Review Department  Phone: [phone]; Fax [phone]  [email or web address]  ATTENTION: Please call with any questions or concerns to [phone]  and carefully listen to the prompts so that you are directed to the right person  Send all requests for admission clinical reviews, approved or denied determinations and any other requests to fax [phone]   All voicemails are confidential  Units total) by mouth 2 (two) times a day 60 tablet 0    doxycycline hyclate (VIBRAMYCIN) 100 mg capsule Take 1 capsule (100 mg total) by mouth 2 (two) times a day for 7 days 14 capsule 0     No current facility-administered medications for this visit  Review of Systems   Constitutional: Negative for activity change, appetite change, chills and fever  HENT: Negative for congestion, ear pain, postnasal drip, rhinorrhea and sneezing  Eyes: Positive for blurred vision, photophobia, pain, discharge, redness and itching  Negative for double vision  Respiratory: Negative for cough  Gastrointestinal: Negative for nausea and vomiting  Skin: Negative for rash  Neurological: Negative for dizziness, light-headedness and headaches  Objective:  Vitals:    08/19/19 1544   Weight: 45 2 kg (99 lb 9 6 oz)   Height: 5' 2 5" (1 588 m)     Body mass index is 17 93 kg/m²  Physical Exam   Constitutional: She is oriented to person, place, and time  She appears well-developed and well-nourished  HENT:   Head: Normocephalic  Eyes: EOM are normal  Right eye exhibits discharge  Left eye exhibits no discharge  Right conjunctiva is injected  Left conjunctiva is injected  Neurological: She is alert and oriented to person, place, and time  Skin: Skin is warm and dry  No rash noted  Psychiatric: She has a normal mood and affect  Her behavior is normal  Judgment and thought content normal    Nursing note and vitals reviewed

## 2019-10-09 NOTE — SOCIAL WORK
Per chart review and rounds with nursing pt is reported to be independent with no apparent discharge needs at this time

## 2019-10-09 NOTE — PLAN OF CARE
Problem: PAIN - ADULT  Goal: Verbalizes/displays adequate comfort level or baseline comfort level  Description  Interventions:  - Encourage patient to monitor pain and request assistance  - Assess pain using appropriate pain scale  - Administer analgesics based on type and severity of pain and evaluate response  - Implement non-pharmacological measures as appropriate and evaluate response  - Consider cultural and social influences on pain and pain management  - Notify physician/advanced practitioner if interventions unsuccessful or patient reports new pain  10/9/2019 1850 by Rusty Turner RN  Outcome: Completed  10/9/2019 1234 by Rusty Turner RN  Outcome: Progressing     Problem: INFECTION - ADULT  Goal: Absence or prevention of progression during hospitalization  Description  INTERVENTIONS:  - Assess and monitor for signs and symptoms of infection  - Monitor lab/diagnostic results  - Monitor all insertion sites, i e  indwelling lines, tubes, and drains  - Monitor endotracheal if appropriate and nasal secretions for changes in amount and color  - [place] appropriate cooling/warming therapies per order  - Administer medications as ordered  - Instruct and encourage patient and family to use good hand hygiene technique  - Identify and instruct in appropriate isolation precautions for identified infection/condition  10/9/2019 1850 by Rusty Turner RN  Outcome: Completed  10/9/2019 1234 by Rusty Turner RN  Outcome: Progressing     Problem: SAFETY ADULT  Goal: Patient will remain free of falls  Description  INTERVENTIONS:  - Assess patient frequently for physical needs  -  Identify cognitive and physical deficits and behaviors that affect risk of falls    -  [place] fall precautions as indicated by assessment   - Educate patient/family on patient safety including physical limitations  - Instruct patient to call for assistance with activity based on assessment  - Modify environment to reduce risk of injury  - Consider OT/PT consult to assist with strengthening/mobility  10/9/2019 1850 by Doug Lynn RN  Outcome: Completed  10/9/2019 1234 by Doug Lynn RN  Outcome: Progressing     Problem: DISCHARGE PLANNING  Goal: Discharge to home or other facility with appropriate resources  Description  INTERVENTIONS:  - Identify barriers to discharge w/patient and caregiver  - Arrange for needed discharge resources and transportation as appropriate  - Identify discharge learning needs (meds, wound care, etc )  - Arrange for interpretive services to assist at discharge as needed  - Refer to Case Management Department for coordinating discharge planning if the patient needs post-hospital services based on physician/advanced practitioner order or complex needs related to functional status, cognitive ability, or social support system  10/9/2019 1850 by Doug Lynn RN  Outcome: Completed  10/9/2019 1234 by Doug Lynn RN  Outcome: Progressing     Problem: Knowledge Deficit  Goal: Patient/family/caregiver demonstrates understanding of disease process, treatment plan, medications, and discharge instructions  Description  Complete learning assessment and assess knowledge base    Interventions:  - Provide teaching at level of understanding  - Provide teaching via preferred learning methods  10/9/2019 1850 by Doug Lynn RN  Outcome: Completed  10/9/2019 1234 by Doug Lynn RN  Outcome: Progressing

## 2019-10-09 NOTE — DISCHARGE INSTRUCTIONS
Transurethral Prostatectomy   WHAT YOU NEED TO KNOW:   A transurethral prostatectomy is surgery that is done to remove part or all of your prostate gland  This surgery is also called transurethral resection of the prostate (TURP)  DISCHARGE INSTRUCTIONS:   Medicines:   · Pain medicine: You may be given a prescription medicine to decrease pain  Do not wait until the pain is severe before you take this medicine  · Antibiotics: This medicine is given to fight or prevent an infection caused by bacteria  Always take your antibiotics exactly as ordered by your healthcare provider  Do not stop taking your medicine unless directed by your healthcare provider  Never save antibiotics or take leftover antibiotics that were given to you for another illness  · Take your medicine as directed  Contact your healthcare provider if you think your medicine is not helping or if you have side effects  Tell him or her if you are allergic to any medicine  Keep a list of the medicines, vitamins, and herbs you take  Include the amounts, and when and why you take them  Bring the list or the pill bottles to follow-up visits  Carry your medicine list with you in case of an emergency  Follow up with your healthcare provider or urologist as directed: You may need to return to make sure you do not have an infection, or to have your Griffin catheter removed  Write down your questions so you remember to ask them during your visits  Griffin catheter care: A Griffin catheter is a tube put into your bladder to drain your urine into a bag  Keep the bag of urine well below your waist  Lifting the urine bag higher will make the urine flow back into your bladder, which can cause an infection  Do not pull on the catheter  This may cause pain and bleeding, and the catheter may come out  Do not let the catheter tubing kink, because this will block the flow of urine   Ask for more information about how to care for yourself when you have a Griffin catheter in place  Bladder control:  After surgery, you may leak urine and have trouble controlling when you urinate  Ask for more information about the following ways to help decrease urine leakage:  · Avoid caffeine:  Caffeine can cause problems with bladder control and increase your need to urinate  · Do pelvic floor muscle exercises:  Pelvic floor muscle exercises may help improve your bladder control, if you leak urine  These exercises are done by tightening and relaxing your pelvic muscles  Ask how to do pelvic floor muscle exercises, and how often to do them  · Increase your liquids:  Drink larger amounts of liquid throughout the day  · Wear a pad or adult diapers: These may help to absorb leaking urine and decrease the odor  Activity guidelines:  Ask when it is okay for you to return to work and activities, or to have sex  Contact your healthcare provider or urologist if:   · You have a fever  · You have new or more blood in your urine  · You have trouble starting to urinate, or have a weak stream of urine when you urinate  · You feel like you have a full bladder, even after you urinate  You may also leak urine  · You often wake up during the night to urinate  You may also feel the need to urinate right away  · You feel pain and burning when you urinate  · You feel pain or pressure in your lower abdomen  · Your urine looks cloudy, and smells bad  · You have trouble getting an erection or ejaculating  · You have questions or concerns about your condition or care  Seek care immediately or call 911 if:   · You urinate little or not at all  · You have severe abdominal or back pain  · You are dizzy or confused  · You have abdominal pain, nausea, and vomiting  · Your heartbeat is slower than usual   © 2017 [facility] Information is for End User's use only and may not be sold, redistributed or otherwise used for commercial purposes  All illustrations and images included in CareNotes® are the copyrighted property of A D A M , Inc  or Ramesh Conley  The above information is an  only  It is not intended as medical advice for individual conditions or treatments  Talk to your doctor, nurse or pharmacist before following any medical regimen to see if it is safe and effective for you

## 2019-10-10 ENCOUNTER — TRANSCRIBE ORDERS (OUTPATIENT)
Dept: ADMINISTRATIVE | Facility: HOSPITAL | Age: 69
End: 2019-10-10

## 2019-10-10 DIAGNOSIS — N40.1 ENLARGED PROSTATE WITH URINARY OBSTRUCTION: Primary | ICD-10-CM

## 2019-10-10 DIAGNOSIS — N13.8 ENLARGED PROSTATE WITH URINARY OBSTRUCTION: Primary | ICD-10-CM

## 2019-10-10 DIAGNOSIS — N17.9 ACUTE RENAL FAILURE, UNSPECIFIED ACUTE RENAL FAILURE TYPE (HCC): ICD-10-CM

## 2019-10-10 DIAGNOSIS — N04.1 NEPHROTIC SYNDROME WITH FOCAL GLOMERULOSCLEROSIS: ICD-10-CM

## 2019-10-10 DIAGNOSIS — R33.9 RETENTION OF URINE, UNSPECIFIED: ICD-10-CM

## 2019-10-17 ENCOUNTER — HOSPITAL ENCOUNTER (OUTPATIENT)
Dept: RADIOLOGY | Facility: HOSPITAL | Age: 69
Discharge: HOME | End: 2019-10-17
Attending: UROLOGY
Payer: MEDICARE

## 2019-10-17 DIAGNOSIS — R33.9 RETENTION OF URINE, UNSPECIFIED: ICD-10-CM

## 2019-10-17 DIAGNOSIS — N13.8 ENLARGED PROSTATE WITH URINARY OBSTRUCTION: ICD-10-CM

## 2019-10-17 DIAGNOSIS — N40.1 ENLARGED PROSTATE WITH URINARY OBSTRUCTION: ICD-10-CM

## 2019-10-17 DIAGNOSIS — N17.9 ACUTE RENAL FAILURE, UNSPECIFIED ACUTE RENAL FAILURE TYPE (HCC): ICD-10-CM

## 2019-10-17 PROCEDURE — 76770 US EXAM ABDO BACK WALL COMP: CPT

## 2019-10-25 ENCOUNTER — OFFICE VISIT (OUTPATIENT)
Dept: FAMILY MEDICINE CLINIC | Facility: CLINIC | Age: 69
End: 2019-10-25
Payer: MEDICARE

## 2019-10-25 VITALS
HEIGHT: 74 IN | DIASTOLIC BLOOD PRESSURE: 78 MMHG | WEIGHT: 207 LBS | TEMPERATURE: 100.4 F | BODY MASS INDEX: 26.56 KG/M2 | HEART RATE: 88 BPM | SYSTOLIC BLOOD PRESSURE: 110 MMHG | RESPIRATION RATE: 16 BRPM

## 2019-10-25 DIAGNOSIS — Z85.820 HISTORY OF MALIGNANT MELANOMA OF SKIN: ICD-10-CM

## 2019-10-25 DIAGNOSIS — Z90.79 S/P TURP: ICD-10-CM

## 2019-10-25 DIAGNOSIS — Z97.8 CHRONIC INDWELLING FOLEY CATHETER: ICD-10-CM

## 2019-10-25 DIAGNOSIS — N40.0 ENLARGED PROSTATE: ICD-10-CM

## 2019-10-25 DIAGNOSIS — N39.0 ACUTE UTI: Primary | ICD-10-CM

## 2019-10-25 DIAGNOSIS — R33.9 URINARY RETENTION: ICD-10-CM

## 2019-10-25 PROBLEM — R10.31 INGUINAL PAIN, RIGHT: Status: RESOLVED | Noted: 2018-04-06 | Resolved: 2019-10-25

## 2019-10-25 PROBLEM — B35.4 TINEA CORPORIS: Status: RESOLVED | Noted: 2019-06-24 | Resolved: 2019-10-25

## 2019-10-25 PROBLEM — N17.9 ACUTE KIDNEY INJURY (HCC): Status: RESOLVED | Noted: 2019-09-10 | Resolved: 2019-10-25

## 2019-10-25 PROBLEM — R31.9 HEMATURIA: Status: RESOLVED | Noted: 2019-09-12 | Resolved: 2019-10-25

## 2019-10-25 PROBLEM — N13.30 HYDRONEPHROSIS: Status: RESOLVED | Noted: 2019-09-11 | Resolved: 2019-10-25

## 2019-10-25 PROBLEM — R35.0 URINARY FREQUENCY: Status: RESOLVED | Noted: 2019-09-04 | Resolved: 2019-10-25

## 2019-10-25 PROBLEM — E87.6 HYPOKALEMIA: Status: RESOLVED | Noted: 2019-09-10 | Resolved: 2019-10-25

## 2019-10-25 PROBLEM — M79.89 LEG SWELLING: Status: RESOLVED | Noted: 2019-09-04 | Resolved: 2019-10-25

## 2019-10-25 PROBLEM — R33.8 ACUTE URINARY RETENTION: Status: RESOLVED | Noted: 2019-09-10 | Resolved: 2019-10-25

## 2019-10-25 PROBLEM — R19.00 ABDOMINAL LUMP: Status: RESOLVED | Noted: 2019-09-04 | Resolved: 2019-10-25

## 2019-10-25 LAB
SL AMB  POCT GLUCOSE, UA: ABNORMAL
SL AMB LEUKOCYTE ESTERASE,UA: ABNORMAL
SL AMB POCT BILIRUBIN,UA: ABNORMAL
SL AMB POCT BLOOD,UA: 250
SL AMB POCT CLARITY,UA: ABNORMAL
SL AMB POCT COLOR,UA: YELLOW
SL AMB POCT KETONES,UA: ABNORMAL
SL AMB POCT NITRITE,UA: ABNORMAL
SL AMB POCT PH,UA: 6
SL AMB POCT SPECIFIC GRAVITY,UA: 1.01
SL AMB POCT URINE PROTEIN: ABNORMAL
SL AMB POCT UROBILINOGEN: ABNORMAL

## 2019-10-25 PROCEDURE — 81003 URINALYSIS AUTO W/O SCOPE: CPT | Performed by: FAMILY MEDICINE

## 2019-10-25 PROCEDURE — 87186 SC STD MICRODIL/AGAR DIL: CPT | Performed by: FAMILY MEDICINE

## 2019-10-25 PROCEDURE — 87077 CULTURE AEROBIC IDENTIFY: CPT | Performed by: FAMILY MEDICINE

## 2019-10-25 PROCEDURE — 87086 URINE CULTURE/COLONY COUNT: CPT | Performed by: FAMILY MEDICINE

## 2019-10-25 PROCEDURE — 99214 OFFICE O/P EST MOD 30 MIN: CPT | Performed by: FAMILY MEDICINE

## 2019-10-25 RX ORDER — CIPROFLOXACIN 500 MG/1
500 TABLET, FILM COATED ORAL 2 TIMES DAILY
Qty: 14 TABLET | Refills: 0 | Status: SHIPPED | OUTPATIENT
Start: 2019-10-25 | End: 2019-11-01

## 2019-10-25 NOTE — PROGRESS NOTES
Assessment/Plan:    No problem-specific Assessment & Plan notes found for this encounter  Acute uti with brown catheter and urinary retention  Quinolone risks advised  u c/s pending  Cc labs to Dr Garth Hoyt  Risks of colonization advised  Hx MM stable  Urinary retention, keep uro f/u for voiding trial     Diagnoses and all orders for this visit:    Acute UTI  -     POCT urine dip auto non-scope  -     Cancel: Urine culture  -     ciprofloxacin (CIPRO) 500 mg tablet; Take 1 tablet (500 mg total) by mouth 2 (two) times a day for 7 days  -     Urine culture    Enlarged prostate    S/P TURP    History of malignant melanoma of skin    Chronic indwelling Brown catheter    Urinary retention        Return if symptoms worsen or fail to improve  Subjective:      Patient ID: Farzaneh Wolfe is a 81 y o  male  Chief Complaint   Patient presents with    Urinary Tract Infection     prcma       HPI  S/p TURP on 10/8/19  Failed voiding trial at home, slow stream, bladder scan 8oz, catheter reinserted  Pt opted to keep brown in for another 2w  Has f/u with Dr Garth Hoyt    This am left flank pain  Feverish  Cloudier urine x 3d  Tries to keep equipment clean  Some nausea  No vomit    The following portions of the patient's history were reviewed and updated as appropriate: allergies, current medications, past family history, past medical history, past social history, past surgical history and problem list     Review of Systems   Gastrointestinal: Negative for abdominal distention and abdominal pain  Neurological: Negative for dizziness           Current Outpatient Medications   Medication Sig Dispense Refill    fluticasone (FLONASE) 50 mcg/act nasal spray 2 puffs into each nostril daily at bedtime       tamsulosin (FLOMAX) 0 4 mg Take 0 4 mg by mouth daily with dinner      amoxicillin (AMOXIL) 500 mg capsule take 2 capsules by mouth immediately then 1 capsule every 8 hours until finished  0    ciprofloxacin (CIPRO) 500 mg tablet Take 1 tablet (500 mg total) by mouth 2 (two) times a day for 7 days 14 tablet 0     No current facility-administered medications for this visit  Objective:    /78   Pulse 88   Temp 100 4 °F (38 °C)   Resp 16   Ht 6' 2" (1 88 m)   Wt 93 9 kg (207 lb)   BMI 26 58 kg/m²        Physical Exam   Constitutional: He appears well-developed  No distress  HENT:   Head: Normocephalic  Mouth/Throat: No oropharyngeal exudate  Eyes: Conjunctivae are normal  No scleral icterus  Neck: Neck supple  Cardiovascular: Normal rate, regular rhythm and intact distal pulses  Pulmonary/Chest: Effort normal and breath sounds normal  No respiratory distress  Abdominal: Soft  Bowel sounds are normal  He exhibits no distension  There is no guarding  No cvat   Musculoskeletal: He exhibits no edema or deformity  Neurological: He is alert  Skin: Skin is warm and dry  No pallor  Psychiatric: His behavior is normal  Thought content normal    Nursing note and vitals reviewed               Kallie Membreno DO

## 2019-10-27 LAB — BACTERIA UR CULT: ABNORMAL

## 2019-10-31 ENCOUNTER — HOSPITAL ENCOUNTER (OUTPATIENT)
Dept: RADIOLOGY | Facility: HOSPITAL | Age: 69
Discharge: HOME | End: 2019-10-31
Attending: UROLOGY
Payer: MEDICARE

## 2019-10-31 DIAGNOSIS — N17.9 ACUTE RENAL FAILURE, UNSPECIFIED ACUTE RENAL FAILURE TYPE (HCC): ICD-10-CM

## 2019-10-31 DIAGNOSIS — N04.1 NEPHROTIC SYNDROME WITH FOCAL GLOMERULOSCLEROSIS: ICD-10-CM

## 2019-10-31 DIAGNOSIS — R33.9 RETENTION OF URINE, UNSPECIFIED: ICD-10-CM

## 2019-10-31 PROCEDURE — 76770 US EXAM ABDO BACK WALL COMP: CPT

## 2019-12-01 PROBLEM — N39.0 ACUTE UTI: Status: RESOLVED | Noted: 2019-10-25 | Resolved: 2019-12-01

## 2019-12-03 ENCOUNTER — OFFICE VISIT (OUTPATIENT)
Dept: FAMILY MEDICINE CLINIC | Facility: CLINIC | Age: 69
End: 2019-12-03
Payer: MEDICARE

## 2019-12-03 VITALS
SYSTOLIC BLOOD PRESSURE: 132 MMHG | HEART RATE: 89 BPM | WEIGHT: 210 LBS | HEIGHT: 74 IN | BODY MASS INDEX: 26.95 KG/M2 | OXYGEN SATURATION: 96 % | RESPIRATION RATE: 16 BRPM | DIASTOLIC BLOOD PRESSURE: 70 MMHG | TEMPERATURE: 99.2 F

## 2019-12-03 DIAGNOSIS — Z12.11 COLON CANCER SCREENING: ICD-10-CM

## 2019-12-03 DIAGNOSIS — J30.9 ALLERGIC RHINITIS, UNSPECIFIED SEASONALITY, UNSPECIFIED TRIGGER: Primary | ICD-10-CM

## 2019-12-03 DIAGNOSIS — Z90.79 S/P TURP: ICD-10-CM

## 2019-12-03 DIAGNOSIS — N40.0 ENLARGED PROSTATE WITHOUT LOWER URINARY TRACT SYMPTOMS (LUTS): ICD-10-CM

## 2019-12-03 PROBLEM — R33.9 URINARY RETENTION: Status: RESOLVED | Noted: 2019-10-25 | Resolved: 2019-12-03

## 2019-12-03 PROBLEM — Z97.8 CHRONIC INDWELLING FOLEY CATHETER: Status: RESOLVED | Noted: 2019-10-25 | Resolved: 2019-12-03

## 2019-12-03 PROCEDURE — 99214 OFFICE O/P EST MOD 30 MIN: CPT | Performed by: FAMILY MEDICINE

## 2019-12-03 RX ORDER — TAMSULOSIN HYDROCHLORIDE 0.4 MG/1
0.4 CAPSULE ORAL
Qty: 90 CAPSULE | Refills: 1 | Status: SHIPPED | OUTPATIENT
Start: 2019-12-03 | End: 2020-02-13 | Stop reason: SDUPTHER

## 2019-12-03 RX ORDER — FLUTICASONE PROPIONATE 50 MCG
2 SPRAY, SUSPENSION (ML) NASAL
Qty: 3 BOTTLE | Refills: 3 | Status: SHIPPED | OUTPATIENT
Start: 2019-12-03 | End: 2022-04-29 | Stop reason: SDUPTHER

## 2019-12-03 NOTE — PATIENT INSTRUCTIONS
Hypertension is chronic blood pressure above 140/90 when rested and accurately measured on a good day

## 2019-12-03 NOTE — PROGRESS NOTES
Assessment/Plan:    No problem-specific Assessment & Plan notes found for this encounter  Advised TLC  JAXON  Exercise when able  F/u for htn if remains elevated, criteria advised  bph stable on flomax that I rx  Cont urologic monitoring  AR stable on flonase     Diagnoses and all orders for this visit:    Allergic rhinitis, unspecified seasonality, unspecified trigger  -     fluticasone (FLONASE) 50 mcg/act nasal spray; 2 sprays into each nostril daily at bedtime    S/P TURP    Colon cancer screening  -     Ambulatory referral to Gastroenterology; Future    Enlarged prostate without lower urinary tract symptoms (luts)  -     tamsulosin (FLOMAX) 0 4 mg; Take 1 capsule (0 4 mg total) by mouth daily with dinner          Return if symptoms worsen or fail to improve  Subjective:      Patient ID: Reese Goddard is a 81 y o  male  Chief Complaint   Patient presents with    Follow-up     has been having high B/P jlopezcma        HPI  [place] out  Low PVR per pt  Urologist Dr Dru West being watched for retention  Makes effort to urinate regularly    Noted bp high at store  [employer]  137/something    2c coffee/d  Eats salty food  Adds salt  Goes to gym often  Some wt gain  Not been running    Brother with htn    The following portions of the patient's history were reviewed and updated as appropriate: allergies, current medications, past family history, past medical history, past social history, past surgical history and problem list     Review of Systems   Respiratory: Negative for shortness of breath  Cardiovascular: Negative for leg swelling  Genitourinary: Negative for difficulty urinating, dysuria and frequency           Current Outpatient Medications   Medication Sig Dispense Refill    fluticasone (FLONASE) 50 mcg/act nasal spray 2 sprays into each nostril daily at bedtime 3 Bottle 3    tamsulosin (FLOMAX) 0 4 mg Take 1 capsule (0 4 mg total) by mouth daily with dinner 90 capsule 1     No current facility-administered medications for this visit  Objective:    /70   Pulse 89   Temp 99 2 °F (37 3 °C)   Resp 16   Ht 6' 2" (1 88 m)   Wt 95 3 kg (210 lb)   SpO2 96%   BMI 26 96 kg/m²        Physical Exam   Constitutional: He appears well-developed  No distress  HENT:   Head: Normocephalic  Right Ear: External ear normal    Left Ear: External ear normal    Mouth/Throat: No oropharyngeal exudate  Eyes: Conjunctivae are normal  No scleral icterus  Neck: Neck supple  Cardiovascular: Normal rate, regular rhythm, normal heart sounds and intact distal pulses  No murmur heard  Pulmonary/Chest: Effort normal and breath sounds normal  No respiratory distress  He has no wheezes  Abdominal: Soft  Bowel sounds are normal  There is no tenderness  There is no guarding  Musculoskeletal: He exhibits no edema or deformity  Neurological: He is alert  He exhibits normal muscle tone  Skin: Skin is warm and dry  No pallor  Psychiatric: His behavior is normal  Thought content normal    Nursing note and vitals reviewed               Xavier Sellers DO

## 2019-12-31 ENCOUNTER — OFFICE VISIT (OUTPATIENT)
Dept: FAMILY MEDICINE CLINIC | Facility: CLINIC | Age: 69
End: 2019-12-31
Payer: MEDICARE

## 2019-12-31 VITALS
HEART RATE: 86 BPM | HEIGHT: 74 IN | OXYGEN SATURATION: 95 % | BODY MASS INDEX: 27.46 KG/M2 | DIASTOLIC BLOOD PRESSURE: 90 MMHG | SYSTOLIC BLOOD PRESSURE: 130 MMHG | TEMPERATURE: 99 F | WEIGHT: 214 LBS | RESPIRATION RATE: 16 BRPM

## 2019-12-31 DIAGNOSIS — J01.00 ACUTE NON-RECURRENT MAXILLARY SINUSITIS: Primary | ICD-10-CM

## 2019-12-31 DIAGNOSIS — N40.0 ENLARGED PROSTATE WITHOUT LOWER URINARY TRACT SYMPTOMS (LUTS): ICD-10-CM

## 2019-12-31 DIAGNOSIS — J30.9 ALLERGIC RHINITIS, UNSPECIFIED SEASONALITY, UNSPECIFIED TRIGGER: ICD-10-CM

## 2019-12-31 PROCEDURE — 99214 OFFICE O/P EST MOD 30 MIN: CPT | Performed by: FAMILY MEDICINE

## 2019-12-31 RX ORDER — AMOXICILLIN AND CLAVULANATE POTASSIUM 875; 125 MG/1; MG/1
1 TABLET, FILM COATED ORAL 2 TIMES DAILY
Qty: 20 TABLET | Refills: 0 | Status: SHIPPED | OUTPATIENT
Start: 2019-12-31 | End: 2020-01-10

## 2019-12-31 NOTE — PROGRESS NOTES
Assessment/Plan:    No problem-specific Assessment & Plan notes found for this encounter  sinsusitis new  mucinex DM    bph stable  Ask urology about otc  Cont flomax    AR stable, cont flonase     Diagnoses and all orders for this visit:    Acute non-recurrent maxillary sinusitis  -     amoxicillin-clavulanate (AUGMENTIN) 875-125 mg per tablet; Take 1 tablet by mouth 2 (two) times a day for 10 days    Enlarged prostate without lower urinary tract symptoms (luts)    Allergic rhinitis, unspecified seasonality, unspecified trigger        Return if symptoms worsen or fail to improve  Subjective:      Patient ID: Endy Spencer is a 81 y o  male  Chief Complaint   Patient presents with    Sinus Problem     for 10 days  vfrma    Tiredness    Cough       HPI  Urinating well  Congested  Last pvr 3 ounces at urologist  He keeps track  No dysuria or hematuria  Advised of possible otc meds and effect on bph/bladder outlet    Stuffy  10d  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  Sneeze  Cough  Better then worse  Sinus dc yellow  Cough worsened  Felt bad yesterday  Used some otc cough meds    The following portions of the patient's history were reviewed and updated as appropriate: allergies, current medications, past family history, past medical history, past social history, past surgical history and problem list     Review of Systems   Respiratory: Negative for shortness of breath  Neurological: Negative for dizziness  Current Outpatient Medications   Medication Sig Dispense Refill    fluticasone (FLONASE) 50 mcg/act nasal spray 2 sprays into each nostril daily at bedtime 3 Bottle 3    tamsulosin (FLOMAX) 0 4 mg Take 1 capsule (0 4 mg total) by mouth daily with dinner 90 capsule 1    amoxicillin-clavulanate (AUGMENTIN) 875-125 mg per tablet Take 1 tablet by mouth 2 (two) times a day for 10 days 20 tablet 0     No current facility-administered medications for this visit          Objective:    /90   Pulse 86   Temp 99 °F (37 2 °C)   Resp 16   Ht 6' 2" (1 88 m)   Wt 97 1 kg (214 lb)   SpO2 95%   BMI 27 48 kg/m²        Physical Exam   Constitutional: He appears well-developed  No distress  HENT:   Head: Normocephalic  Right Ear: External ear normal    Left Ear: External ear normal    Mouth/Throat: No oropharyngeal exudate  Sinuses tender to percussion, nasal turbinates visualized and appear red and swollen     Eyes: Conjunctivae are normal  No scleral icterus  Neck: Neck supple  No tracheal deviation present  Cardiovascular: Normal rate, regular rhythm, normal heart sounds and intact distal pulses  Pulmonary/Chest: Effort normal and breath sounds normal  No respiratory distress  He has no wheezes  He has no rales  Abdominal: Soft  Bowel sounds are normal  He exhibits no distension  There is no tenderness  Musculoskeletal: He exhibits no edema or deformity  Lymphadenopathy:     He has no cervical adenopathy  Neurological: He is alert  Skin: Skin is warm and dry  No pallor  Psychiatric: His behavior is normal  Thought content normal    Nursing note and vitals reviewed               Andreia Haji DO

## 2020-02-13 DIAGNOSIS — N40.0 ENLARGED PROSTATE WITHOUT LOWER URINARY TRACT SYMPTOMS (LUTS): ICD-10-CM

## 2020-02-13 RX ORDER — TAMSULOSIN HYDROCHLORIDE 0.4 MG/1
0.4 CAPSULE ORAL
Qty: 90 CAPSULE | Refills: 0 | Status: SHIPPED | OUTPATIENT
Start: 2020-02-13 | End: 2020-07-27 | Stop reason: SDUPTHER

## 2020-03-24 ENCOUNTER — TELEMEDICINE (OUTPATIENT)
Dept: FAMILY MEDICINE CLINIC | Facility: CLINIC | Age: 70
End: 2020-03-24
Payer: MEDICARE

## 2020-03-24 DIAGNOSIS — J30.9 ALLERGIC RHINITIS, UNSPECIFIED SEASONALITY, UNSPECIFIED TRIGGER: ICD-10-CM

## 2020-03-24 DIAGNOSIS — N40.0 ENLARGED PROSTATE WITHOUT LOWER URINARY TRACT SYMPTOMS (LUTS): ICD-10-CM

## 2020-03-24 DIAGNOSIS — J01.00 ACUTE NON-RECURRENT MAXILLARY SINUSITIS: Primary | ICD-10-CM

## 2020-03-24 PROCEDURE — 99213 OFFICE O/P EST LOW 20 MIN: CPT | Performed by: FAMILY MEDICINE

## 2020-03-24 RX ORDER — AMOXICILLIN AND CLAVULANATE POTASSIUM 875; 125 MG/1; MG/1
1 TABLET, FILM COATED ORAL 2 TIMES DAILY
Qty: 20 TABLET | Refills: 0 | Status: SHIPPED | OUTPATIENT
Start: 2020-03-24 | End: 2020-04-03

## 2020-03-24 NOTE — PROGRESS NOTES
Virtual Regular Visit    Problem List Items Addressed This Visit        Active Problems    Acute non-recurrent maxillary sinusitis - Primary    Relevant Medications    amoxicillin-clavulanate (AUGMENTIN) 875-125 mg per tablet    Allergic rhinitis    Enlarged prostate without lower urinary tract symptoms (luts)               Reason for visit is uri    Encounter provider Shaun Gerardo DO    Provider located at [address]  [address] [facility]  [address]  [place] [address]      Recent Visits  No visits were found meeting these conditions  Showing recent visits within past 7 days and meeting all other requirements     Future Appointments  No visits were found meeting these conditions  Showing future appointments within next 150 days and meeting all other requirements        After connecting through [employer], the patient was identified by name and date of birth  Mukesh Spivey was informed that this is a telemedicine visit and that the visit is being conducted through [employer] [place] which may not be secure and therefore, might not be HIPAA-compliant  My office door was closed  No one else was in the room  He acknowledged consent and understanding of privacy and security of the video platform  The patient has agreed to participate and understands they can discontinue the visit at any time      Subjective  Mukesh Spivey is a 78 y o  male   Sinus sx  3d mucus  Thick yellow  Left nostril  Chest congestion at hs  Worse in am  No fever  No sob  No mucinex or robitussin  Lives alone  No c/d/n/v  No sore throat but did before  Post nasal drip  No wheezing  No sob  No teeth pain    Past Medical History:   Diagnosis Date    Cancer ([facility] [address] ) 2011    melanoma of skin of left shoulder    Colon polyp     Dental abscess     started  4 weeks ago  on RX    Enlarged prostate     Urinary bladder disorder        Past Surgical History:   Procedure Laterality Date    COLONOSCOPY      HERNIA REPAIR      MASS EXCISION      melanoma of left shoulder     DC TRANSURETHRAL ELEC-SURG PROSTATECTOM N/A 10/8/2019    Procedure: CYSTOSCOPY, TRANSURETHRAL RESECTION OF PROSTATE (TURP); Surgeon: Bay Barnes MD;  [facility];  Service: Urology    TONSILLECTOMY         Current Outpatient Medications   Medication Sig Dispense Refill    amoxicillin-clavulanate (AUGMENTIN) 875-125 mg per tablet Take 1 tablet by mouth 2 (two) times a day for 10 days 20 tablet 0    fluticasone (FLONASE) 50 mcg/act nasal spray 2 sprays into each nostril daily at bedtime 3 Bottle 3    tamsulosin (FLOMAX) 0 4 mg Take 1 capsule (0 4 mg total) by mouth daily with dinner 90 capsule 0     No current facility-administered medications for this visit  Allergies   Allergen Reactions    Aspirin GI Intolerance     Other reaction(s): GI upset  Reaction Date: [id number]; Other reaction(s): gi complications  Reaction Date: [id number];        Review of Systems   Respiratory: Positive for cough  Cardiovascular: Negative for chest pain  Physical Exam   Constitutional: He appears well-nourished  No distress  Pulmonary/Chest: Effort normal  No stridor  No respiratory distress  Abdominal: He exhibits no distension  Skin: He is not diaphoretic  No pallor  1  Acute non-recurrent maxillary sinusitis  -     amoxicillin-clavulanate (AUGMENTIN) 875-125 mg per tablet; Take 1 tablet by mouth 2 (two) times a day for 10 days    2  Allergic rhinitis, unspecified seasonality, unspecified trigger    3   Enlarged prostate without lower urinary tract symptoms (luts)      Sinusitis new  Watch sudafed for BPH effect  mucinex DM  F/u if no better  Bph/AR stable    [id number]

## 2020-06-11 DIAGNOSIS — Z20.822 COVID-19 RULED OUT BY LABORATORY TESTING: ICD-10-CM

## 2020-06-11 PROCEDURE — U0003 INFECTIOUS AGENT DETECTION BY NUCLEIC ACID (DNA OR RNA); SEVERE ACUTE RESPIRATORY SYNDROME CORONAVIRUS 2 (SARS-COV-2) (CORONAVIRUS DISEASE [COVID-19]), AMPLIFIED PROBE TECHNIQUE, MAKING USE OF HIGH THROUGHPUT TECHNOLOGIES AS DESCRIBED BY CMS-2020-01-R: HCPCS | Performed by: PHYSICIAN ASSISTANT

## 2020-06-12 LAB — SARS-COV-2 RNA SPEC QL NAA+PROBE: NOT DETECTED

## 2020-06-15 ENCOUNTER — ANESTHESIA (OUTPATIENT)
Dept: PERIOP | Facility: AMBULARY SURGERY CENTER | Age: 70
End: 2020-06-15
Payer: MEDICARE

## 2020-06-15 ENCOUNTER — ANESTHESIA EVENT (OUTPATIENT)
Dept: PERIOP | Facility: AMBULARY SURGERY CENTER | Age: 70
End: 2020-06-15
Payer: MEDICARE

## 2020-06-15 ENCOUNTER — HOSPITAL ENCOUNTER (OUTPATIENT)
Facility: AMBULARY SURGERY CENTER | Age: 70
Setting detail: OUTPATIENT SURGERY
Discharge: HOME | End: 2020-06-15
Attending: OPHTHALMOLOGY | Admitting: OPHTHALMOLOGY
Payer: MEDICARE

## 2020-06-15 VITALS
OXYGEN SATURATION: 97 % | HEART RATE: 78 BPM | BODY MASS INDEX: 27.46 KG/M2 | SYSTOLIC BLOOD PRESSURE: 127 MMHG | TEMPERATURE: 97.8 F | RESPIRATION RATE: 16 BRPM | DIASTOLIC BLOOD PRESSURE: 83 MMHG | HEIGHT: 74 IN | WEIGHT: 214 LBS

## 2020-06-15 DIAGNOSIS — H25.011 CORTICAL AGE-RELATED CATARACT OF RIGHT EYE: ICD-10-CM

## 2020-06-15 DIAGNOSIS — Z20.822 COVID-19 RULED OUT BY LABORATORY TESTING: Primary | ICD-10-CM

## 2020-06-15 PROCEDURE — V2632 POST CHMBR INTRAOCULAR LENS: HCPCS | Performed by: OPHTHALMOLOGY

## 2020-06-15 DEVICE — ACRYSOF(R) IQ ASPHERIC NATURAL IOL, SINGLE-PIECE ACRYLIC FOLDABLE PCL, UV WITH BLUE LIGHTFILTER, 13.0MM LENGTH, 6.0MM ANTERIORASYMMETRIC BICONVEX OPTIC, PLANAR HAPTICS.
Type: IMPLANTABLE DEVICE | Site: EYE | Status: FUNCTIONAL
Brand: ACRYSOF®

## 2020-06-15 RX ORDER — KETOROLAC TROMETHAMINE 5 MG/ML
1 SOLUTION OPHTHALMIC
Status: ACTIVE | OUTPATIENT
Start: 2020-06-15 | End: 2020-06-15

## 2020-06-15 RX ORDER — TETRACAINE HYDROCHLORIDE 5 MG/ML
SOLUTION OPHTHALMIC AS NEEDED
Status: DISCONTINUED | OUTPATIENT
Start: 2020-06-15 | End: 2020-06-15 | Stop reason: HOSPADM

## 2020-06-15 RX ORDER — MIDAZOLAM HYDROCHLORIDE 2 MG/2ML
INJECTION, SOLUTION INTRAMUSCULAR; INTRAVENOUS AS NEEDED
Status: DISCONTINUED | OUTPATIENT
Start: 2020-06-15 | End: 2020-06-15 | Stop reason: SURG

## 2020-06-15 RX ORDER — SODIUM CHLORIDE, SODIUM LACTATE, POTASSIUM CHLORIDE, CALCIUM CHLORIDE 600; 310; 30; 20 MG/100ML; MG/100ML; MG/100ML; MG/100ML
125 INJECTION, SOLUTION INTRAVENOUS CONTINUOUS
Status: DISCONTINUED | OUTPATIENT
Start: 2020-06-15 | End: 2020-06-15 | Stop reason: HOSPADM

## 2020-06-15 RX ORDER — TETRACAINE HYDROCHLORIDE 5 MG/ML
1 SOLUTION OPHTHALMIC ONCE
Status: COMPLETED | OUTPATIENT
Start: 2020-06-15 | End: 2020-06-15

## 2020-06-15 RX ORDER — PHENYLEPHRINE HCL 2.5 %
1 DROPS OPHTHALMIC (EYE)
Status: ACTIVE | OUTPATIENT
Start: 2020-06-15 | End: 2020-06-15

## 2020-06-15 RX ORDER — GATIFLOXACIN 5 MG/ML
SOLUTION/ DROPS OPHTHALMIC AS NEEDED
Status: DISCONTINUED | OUTPATIENT
Start: 2020-06-15 | End: 2020-06-15 | Stop reason: HOSPADM

## 2020-06-15 RX ORDER — LIDOCAINE HYDROCHLORIDE 20 MG/ML
1 JELLY TOPICAL
Status: COMPLETED | OUTPATIENT
Start: 2020-06-15 | End: 2020-06-15

## 2020-06-15 RX ORDER — BALANCED SALT SOLUTION 6.4; .75; .48; .3; 3.9; 1.7 MG/ML; MG/ML; MG/ML; MG/ML; MG/ML; MG/ML
SOLUTION OPHTHALMIC AS NEEDED
Status: DISCONTINUED | OUTPATIENT
Start: 2020-06-15 | End: 2020-06-15 | Stop reason: HOSPADM

## 2020-06-15 RX ORDER — CIPROFLOXACIN HYDROCHLORIDE 3.5 MG/ML
1 SOLUTION/ DROPS TOPICAL 4 TIMES DAILY
Qty: 5 ML | Refills: 0
Start: 2020-06-15 | End: 2020-11-02

## 2020-06-15 RX ORDER — CYCLOPENTOLATE HYDROCHLORIDE 10 MG/ML
1 SOLUTION/ DROPS OPHTHALMIC
Status: ACTIVE | OUTPATIENT
Start: 2020-06-15 | End: 2020-06-15

## 2020-06-15 RX ADMIN — CYCLOPENTOLATE HYDROCHLORIDE 1 DROP: 10 SOLUTION/ DROPS OPHTHALMIC at 14:00

## 2020-06-15 RX ADMIN — PHENYLEPHRINE HYDROCHLORIDE 1 DROP: 25 SOLUTION/ DROPS OPHTHALMIC at 14:33

## 2020-06-15 RX ADMIN — KETOROLAC TROMETHAMINE 1 DROP: 5 SOLUTION OPHTHALMIC at 14:33

## 2020-06-15 RX ADMIN — KETOROLAC TROMETHAMINE 1 DROP: 5 SOLUTION OPHTHALMIC at 14:15

## 2020-06-15 RX ADMIN — KETOROLAC TROMETHAMINE 1 DROP: 5 SOLUTION OPHTHALMIC at 14:00

## 2020-06-15 RX ADMIN — LIDOCAINE HYDROCHLORIDE 1 APPLICATION: 20 JELLY TOPICAL at 14:33

## 2020-06-15 RX ADMIN — TETRACAINE HYDROCHLORIDE 1 DROP: 5 SOLUTION OPHTHALMIC at 14:00

## 2020-06-15 RX ADMIN — PHENYLEPHRINE HYDROCHLORIDE 1 DROP: 25 SOLUTION/ DROPS OPHTHALMIC at 14:00

## 2020-06-15 RX ADMIN — LIDOCAINE HYDROCHLORIDE 1 APPLICATION: 20 JELLY TOPICAL at 14:15

## 2020-06-15 RX ADMIN — PHENYLEPHRINE HYDROCHLORIDE 1 DROP: 25 SOLUTION/ DROPS OPHTHALMIC at 14:15

## 2020-06-15 RX ADMIN — MIDAZOLAM HYDROCHLORIDE 2 MG: 1 INJECTION, SOLUTION INTRAMUSCULAR; INTRAVENOUS at 14:45

## 2020-06-15 RX ADMIN — CYCLOPENTOLATE HYDROCHLORIDE 1 DROP: 10 SOLUTION/ DROPS OPHTHALMIC at 14:33

## 2020-06-15 RX ADMIN — LIDOCAINE HYDROCHLORIDE 1 APPLICATION: 20 JELLY TOPICAL at 14:00

## 2020-06-15 RX ADMIN — CYCLOPENTOLATE HYDROCHLORIDE 1 DROP: 10 SOLUTION/ DROPS OPHTHALMIC at 14:15

## 2020-06-25 ENCOUNTER — TRANSCRIBE ORDERS (OUTPATIENT)
Dept: LAB | Facility: CLINIC | Age: 70
End: 2020-06-25

## 2020-06-25 DIAGNOSIS — Z01.818 OTHER SPECIFIED PRE-OPERATIVE EXAMINATION: Primary | ICD-10-CM

## 2020-06-25 PROCEDURE — 1124F ACP DISCUSS-NO DSCNMKR DOCD: CPT | Performed by: PATHOLOGY

## 2020-06-29 NOTE — H&P
History & Physical - [facility]  Anatoliyjusten Adame 78 y o  male MRN: [id number]  Unit/Bed#:  Encounter: [id number]    ASSESSMENT/PLAN:    BPH  Urinary retention for 3,800mL and subsequent TURP 10/08/19  PVR rising again after surgery and cystoscopy 06/24/20 indicated left lateral lobe regrowth or reshaping with scar and false passage  No sensation of a full bladder  · Redo TURP procedure to help with his elevated and rising PVRs  · Continue tamsulosin 0 4mg     Covid  Covid 19 pandemic  · Order Covid testing prior to surgery       HISTORY OF PRESENT ILLNESS:  77 y/o with a history of 3,800ml retention who then underwent TURP 10/08/19  Underwent successful voiding trial, but at his follow up visits his PVR continues to rise  Office cystoscopy indicated left lateral lobe regrowth or reshaping of prostate with scar and false passage  Pt current presents for repeat TURP to help remove any obstruction that is contributing to his incomplete bladder emptying and to decrease the risk of a brown being placed into a false passage in the future  PAST MEDICAL HISTORY:  Past Medical History:   Diagnosis Date    Cancer [facility]) 2011    melanoma of skin of left shoulder    Colon polyp     Dental abscess     started  4 weeks ago  on RX    Enlarged prostate     Urinary bladder disorder        PAST SURGICAL HISTORY:  Past Surgical History:   Procedure Laterality Date    COLONOSCOPY      HERNIA REPAIR      MASS EXCISION      melanoma of left shoulder     ME TRANSURETHRAL ELEC-SURG PROSTATECTOM N/A 10/8/2019    Procedure: CYSTOSCOPY, TRANSURETHRAL RESECTION OF PROSTATE (TURP); Surgeon: Mirian Larsen MD;  Location: [address] [place] [address];  Service: Urology    ME XCAPSL CTRC RMVL INSJ IO LENS PROSTH W/O ECP Right 6/15/2020    Procedure: EXTRACTION EXTRACAPSULAR CATARACT PHACO INTRAOCULAR LENS (IOL);   Surgeon: Kari Damon MD;  [facility];  Service: Ophthalmology    TONSILLECTOMY         ALLERGIES:  Allergies Allergen Reactions    Aspirin GI Intolerance     Other reaction(s): GI upset  Reaction Date: [id number]; Other reaction(s): gi complications  Reaction Date: [id number];        SOCIAL HISTORY:  Social History     Substance and Sexual Activity   Alcohol Use Never    Frequency: Never     Social History     Substance and Sexual Activity   Drug Use Never     Social History     Tobacco Use   Smoking Status Former Smoker    Packs/day: 1 00    Types: Cigarettes    Last attempt to quit: 8/1/2016    Years since quitting: 3 9   Smokeless Tobacco Never Used       FAMILY HISTORY:  Family History   Problem Relation Age of Onset    Diabetes Mother     Heart failure Father     Hypertension Father     No Known Problems Sister     Diabetes Brother        MEDICATIONS:  No current facility-administered medications for this encounter  Current Outpatient Medications:     Bromfenac Sodium (BromSite) 0 075 % SOLN, Administer 1 drop to the right eye 2 (two) times a day, Disp: , Rfl: 0    ciprofloxacin (CILOXAN) 0 3 % ophthalmic solution, Administer 1 drop to the right eye 4 (four) times a day, Disp: 5 mL, Rfl: 0    fluticasone (FLONASE) 50 mcg/act nasal spray, 2 sprays into each nostril daily at bedtime, Disp: 3 Bottle, Rfl: 3    tamsulosin (FLOMAX) 0 4 mg, Take 1 capsule (0 4 mg total) by mouth daily with dinner, Disp: 90 capsule, Rfl: 0    Review of Systems    PHYSICAL EXAM:  Physical Exam   Constitutional: He appears well-developed  HENT:   Head: Normocephalic  Pulmonary/Chest: Effort normal    Abdominal: Soft  Neurological: He is alert  Skin: Skin is warm  Psychiatric: He has a normal mood and affect         LAB RESULTS:  Lab Results   Component Value Date    WBC 7 93 10/09/2019    HGB 11 4 (L) 10/09/2019    HCT 35 4 (L) 10/09/2019     (H) 10/09/2019     10/09/2019     Lab Results   Component Value Date    SODIUM 139 10/09/2019    K 3 8 10/09/2019     10/09/2019    CO2 27 10/09/2019 BUN 14 10/09/2019    CREATININE 0 92 10/09/2019    GLUC 107 10/09/2019    CALCIUM 8 6 10/09/2019     Lab Results   Component Value Date    CALCIUM 8 6 10/09/2019     Lab Results   Component Value Date    PSA 3 1 09/06/2019         Ruben Aquino PA-C  06/29/20    Portions of the record may have been created with voice recognition software   Occasional wrong word or "sound alike" substitutions may have occurred due to the inherent limitations of voice recognition software   Read the chart carefully and recognize, using context, where substitutions have occurred

## 2020-07-06 ENCOUNTER — APPOINTMENT (OUTPATIENT)
Dept: PREADMISSION TESTING | Facility: HOSPITAL | Age: 70
End: 2020-07-06
Payer: MEDICARE

## 2020-07-06 ENCOUNTER — APPOINTMENT (OUTPATIENT)
Dept: LAB | Facility: HOSPITAL | Age: 70
End: 2020-07-06
Attending: UROLOGY
Payer: MEDICARE

## 2020-07-06 ENCOUNTER — TRANSCRIBE ORDERS (OUTPATIENT)
Dept: ADMINISTRATIVE | Facility: HOSPITAL | Age: 70
End: 2020-07-06

## 2020-07-06 ENCOUNTER — OFFICE VISIT (OUTPATIENT)
Dept: LAB | Facility: HOSPITAL | Age: 70
End: 2020-07-06
Attending: UROLOGY
Payer: MEDICARE

## 2020-07-06 DIAGNOSIS — Z01.818 PREOP TESTING: ICD-10-CM

## 2020-07-06 DIAGNOSIS — Z01.818 PREOP TESTING: Primary | ICD-10-CM

## 2020-07-06 LAB
ABO GROUP BLD: NORMAL
ANION GAP SERPL CALCULATED.3IONS-SCNC: 6 MMOL/L (ref 4–13)
BACTERIA UR QL AUTO: ABNORMAL /HPF
BASOPHILS # BLD AUTO: 0.02 THOUSANDS/ΜL (ref 0–0.1)
BASOPHILS NFR BLD AUTO: 0 % (ref 0–1)
BILIRUB UR QL STRIP: NEGATIVE
BLD GP AB SCN SERPL QL: NEGATIVE
BUN SERPL-MCNC: 14 MG/DL (ref 5–25)
CALCIUM SERPL-MCNC: 9.1 MG/DL (ref 8.3–10.1)
CHLORIDE SERPL-SCNC: 102 MMOL/L (ref 100–108)
CLARITY UR: CLEAR
CO2 SERPL-SCNC: 30 MMOL/L (ref 21–32)
COLOR UR: YELLOW
CREAT SERPL-MCNC: 0.81 MG/DL (ref 0.6–1.3)
EOSINOPHIL # BLD AUTO: 0.31 THOUSAND/ΜL (ref 0–0.61)
EOSINOPHIL NFR BLD AUTO: 6 % (ref 0–6)
ERYTHROCYTE [DISTWIDTH] IN BLOOD BY AUTOMATED COUNT: 13 % (ref 11.6–15.1)
GFR SERPL CREATININE-BSD FRML MDRD: 91 ML/MIN/1.73SQ M
GLUCOSE SERPL-MCNC: 105 MG/DL (ref 65–140)
GLUCOSE UR STRIP-MCNC: NEGATIVE MG/DL
HCT VFR BLD AUTO: 43.3 % (ref 36.5–49.3)
HGB BLD-MCNC: 14.6 G/DL (ref 12–17)
HGB UR QL STRIP.AUTO: ABNORMAL
IMM GRANULOCYTES # BLD AUTO: 0.01 THOUSAND/UL (ref 0–0.2)
IMM GRANULOCYTES NFR BLD AUTO: 0 % (ref 0–2)
KETONES UR STRIP-MCNC: NEGATIVE MG/DL
LEUKOCYTE ESTERASE UR QL STRIP: ABNORMAL
LYMPHOCYTES # BLD AUTO: 1.64 THOUSANDS/ΜL (ref 0.6–4.47)
LYMPHOCYTES NFR BLD AUTO: 31 % (ref 14–44)
MCH RBC QN AUTO: 32.5 PG (ref 26.8–34.3)
MCHC RBC AUTO-ENTMCNC: 33.7 G/DL (ref 31.4–37.4)
MCV RBC AUTO: 96 FL (ref 82–98)
MONOCYTES # BLD AUTO: 0.6 THOUSAND/ΜL (ref 0.17–1.22)
MONOCYTES NFR BLD AUTO: 11 % (ref 4–12)
NEUTROPHILS # BLD AUTO: 2.71 THOUSANDS/ΜL (ref 1.85–7.62)
NEUTS SEG NFR BLD AUTO: 52 % (ref 43–75)
NITRITE UR QL STRIP: NEGATIVE
NON-SQ EPI CELLS URNS QL MICRO: ABNORMAL /HPF
NRBC BLD AUTO-RTO: 0 /100 WBCS
PH UR STRIP.AUTO: 7 [PH]
PLATELET # BLD AUTO: 208 THOUSANDS/UL (ref 149–390)
PMV BLD AUTO: 9.8 FL (ref 8.9–12.7)
POTASSIUM SERPL-SCNC: 3.9 MMOL/L (ref 3.5–5.3)
PROT UR STRIP-MCNC: NEGATIVE MG/DL
RBC # BLD AUTO: 4.49 MILLION/UL (ref 3.88–5.62)
RBC #/AREA URNS AUTO: ABNORMAL /HPF
RH BLD: POSITIVE
SODIUM SERPL-SCNC: 138 MMOL/L (ref 136–145)
SP GR UR STRIP.AUTO: 1.01 (ref 1–1.03)
SPECIMEN EXPIRATION DATE: NORMAL
UROBILINOGEN UR QL STRIP.AUTO: 0.2 E.U./DL
WBC # BLD AUTO: 5.29 THOUSAND/UL (ref 4.31–10.16)
WBC #/AREA URNS AUTO: ABNORMAL /HPF

## 2020-07-06 PROCEDURE — 86901 BLOOD TYPING SEROLOGIC RH(D): CPT

## 2020-07-06 PROCEDURE — 36415 COLL VENOUS BLD VENIPUNCTURE: CPT | Performed by: UROLOGY

## 2020-07-06 PROCEDURE — 87086 URINE CULTURE/COLONY COUNT: CPT

## 2020-07-06 PROCEDURE — 93005 ELECTROCARDIOGRAM TRACING: CPT

## 2020-07-06 PROCEDURE — 86900 BLOOD TYPING SEROLOGIC ABO: CPT

## 2020-07-06 PROCEDURE — 81001 URINALYSIS AUTO W/SCOPE: CPT | Performed by: UROLOGY

## 2020-07-06 PROCEDURE — 80048 BASIC METABOLIC PNL TOTAL CA: CPT

## 2020-07-06 PROCEDURE — 86850 RBC ANTIBODY SCREEN: CPT

## 2020-07-06 PROCEDURE — 85025 COMPLETE CBC W/AUTO DIFF WBC: CPT | Performed by: UROLOGY

## 2020-07-06 RX ORDER — PREDNISOLONE ACETATE 10 MG/ML
1 SUSPENSION/ DROPS OPHTHALMIC 4 TIMES DAILY
COMMUNITY
End: 2020-11-02

## 2020-07-06 NOTE — PRE-PROCEDURE INSTRUCTIONS
My Surgical Experience    The following information was developed to assist you to prepare for your operation  What do I need to do before coming to the hospital?   Arrange for a responsible person to drive you to and from the hospital    Arrange care for your children at home  Children are not allowed in the recovery areas of the hospital   Plan to wear clothing that is easy to put on and take off  If you are having shoulder surgery, wear a shirt that buttons or zippers in the front  Bathing  o Shower the evening before and the morning of your surgery with an antibacterial soap  Please refer to the Pre Op Showering Instructions for Surgery Patients Sheet   o Remove nail polish and all body piercing jewelry  o Do not shave any body part for at least 24 hours before surgery-this includes face, arms, legs and upper body  Food  o Nothing to eat or drink after midnight the night before your surgery  This includes candy and chewing gum  o Exception: If your surgery is after 12:00pm (noon), you may have clear liquids such as 7-Up®, ginger ale, apple or cranberry juice, Jell-O®, water, or clear broth until 8:00 am  o Do not drink milk or juice with pulp on the morning before surgery  o Do not drink alcohol 24 hours before surgery  Medicine  o Follow instructions you received from your surgeon about which medicines you may take on the day of surgery  o If instructed to take medicine on the morning of surgery, take pills with just a small sip of water  Call your prescribing doctor for specific infroamtion on what to do if you take insulin    What should I bring to the hospital?    Bring:  [place] or a walker, if you have them, for foot or knee surgery   A list of the daily medicines, vitamins, minerals, herbals and nutritional supplements you take   Include the dosages of medicines and the time you take them each day   Glasses, dentures or hearing aids   Minimal clothing; you will be wearing hospital sleepwear   Photo ID; required to verify your identity   If you have a Living Will or Power of , bring a copy of the documents   If you have an ostomy, bring an extra pouch and any supplies you use    Do not bring   Medicines or inhalers   Money, valuables or jewelry    What other information should I know about the day of surgery?  Notify your surgeons if you develop a cold, sore throat, cough, fever, rash or any other illness   Report to the Ambulatory Surgical/Same Day Surgery Unit   You will be instructed to stop at Registration only if you have not been pre-registered   Inform your  fi they do not stay that they will be asked by the staff to leave a phone number where they can be reached   Be available to be reached before surgery  In the event the operating room schedule changes, you may be asked to come in earlier or later than expected    *It is important to tell your doctor and others involved in your health care if you are taking or have been taking any non-prescription drugs, vitamins, minerals, herbals or other nutritional supplements  Any of these may interact with some food or medicines and cause a reaction      Pre-Surgery Instructions:   Medication Instructions    Bromfenac Sodium (BromSite) 0 075 % SOLN Instructed patient per Anesthesia Guidelines   ciprofloxacin (CILOXAN) 0 3 % ophthalmic solution Instructed patient per Anesthesia Guidelines   fluticasone (FLONASE) 50 mcg/act nasal spray Instructed patient per Anesthesia Guidelines   prednisoLONE acetate (PRED FORTE) 1 % ophthalmic suspension Instructed patient per Anesthesia Guidelines   tamsulosin (FLOMAX) 0 4 mg Instructed patient per Anesthesia Guidelines

## 2020-07-07 LAB — BACTERIA UR CULT: NORMAL

## 2020-07-08 DIAGNOSIS — Z01.818 OTHER SPECIFIED PRE-OPERATIVE EXAMINATION: ICD-10-CM

## 2020-07-08 LAB
ATRIAL RATE: 61 BPM
P AXIS: 60 DEGREES
PR INTERVAL: 212 MS
QRS AXIS: 72 DEGREES
QRSD INTERVAL: 108 MS
QT INTERVAL: 434 MS
QTC INTERVAL: 436 MS
T WAVE AXIS: 30 DEGREES
VENTRICULAR RATE: 61 BPM

## 2020-07-08 PROCEDURE — U0003 INFECTIOUS AGENT DETECTION BY NUCLEIC ACID (DNA OR RNA); SEVERE ACUTE RESPIRATORY SYNDROME CORONAVIRUS 2 (SARS-COV-2) (CORONAVIRUS DISEASE [COVID-19]), AMPLIFIED PROBE TECHNIQUE, MAKING USE OF HIGH THROUGHPUT TECHNOLOGIES AS DESCRIBED BY CMS-2020-01-R: HCPCS

## 2020-07-08 PROCEDURE — 93010 ELECTROCARDIOGRAM REPORT: CPT | Performed by: INTERNAL MEDICINE

## 2020-07-10 LAB
INPATIENT: NORMAL
SARS-COV-2 RNA SPEC QL NAA+PROBE: NOT DETECTED

## 2020-07-13 ENCOUNTER — ANESTHESIA EVENT (OUTPATIENT)
Dept: PERIOP | Facility: HOSPITAL | Age: 70
End: 2020-07-13
Payer: MEDICARE

## 2020-07-14 ENCOUNTER — ANESTHESIA (OUTPATIENT)
Dept: PERIOP | Facility: HOSPITAL | Age: 70
End: 2020-07-14
Payer: MEDICARE

## 2020-07-14 ENCOUNTER — HOSPITAL ENCOUNTER (OUTPATIENT)
Facility: HOSPITAL | Age: 70
Setting detail: OUTPATIENT SURGERY
Discharge: HOME | End: 2020-07-15
Attending: UROLOGY | Admitting: UROLOGY
Payer: MEDICARE

## 2020-07-14 DIAGNOSIS — N40.1 BENIGN PROSTATIC HYPERPLASIA WITH LOWER URINARY TRACT SYMPTOMS: ICD-10-CM

## 2020-07-14 DIAGNOSIS — Z90.79 S/P TURP: Primary | ICD-10-CM

## 2020-07-14 PROCEDURE — 88305 TISSUE EXAM BY PATHOLOGIST: CPT | Performed by: PATHOLOGY

## 2020-07-14 RX ORDER — MIDAZOLAM HYDROCHLORIDE 2 MG/2ML
INJECTION, SOLUTION INTRAMUSCULAR; INTRAVENOUS AS NEEDED
Status: DISCONTINUED | OUTPATIENT
Start: 2020-07-14 | End: 2020-07-14 | Stop reason: SURG

## 2020-07-14 RX ORDER — DOCUSATE SODIUM 100 MG/1
100 CAPSULE, LIQUID FILLED ORAL 2 TIMES DAILY
Status: DISCONTINUED | OUTPATIENT
Start: 2020-07-14 | End: 2020-07-15 | Stop reason: HOSPADM

## 2020-07-14 RX ORDER — DEXAMETHASONE SODIUM PHOSPHATE 10 MG/ML
INJECTION, SOLUTION INTRAMUSCULAR; INTRAVENOUS AS NEEDED
Status: DISCONTINUED | OUTPATIENT
Start: 2020-07-14 | End: 2020-07-14 | Stop reason: SURG

## 2020-07-14 RX ORDER — SODIUM CHLORIDE, SODIUM LACTATE, POTASSIUM CHLORIDE, CALCIUM CHLORIDE 600; 310; 30; 20 MG/100ML; MG/100ML; MG/100ML; MG/100ML
125 INJECTION, SOLUTION INTRAVENOUS CONTINUOUS
Status: DISCONTINUED | OUTPATIENT
Start: 2020-07-14 | End: 2020-07-15 | Stop reason: HOSPADM

## 2020-07-14 RX ORDER — SENNOSIDES 8.6 MG
1 TABLET ORAL DAILY
Status: DISCONTINUED | OUTPATIENT
Start: 2020-07-14 | End: 2020-07-15 | Stop reason: HOSPADM

## 2020-07-14 RX ORDER — PROPOFOL 10 MG/ML
INJECTION, EMULSION INTRAVENOUS AS NEEDED
Status: DISCONTINUED | OUTPATIENT
Start: 2020-07-14 | End: 2020-07-14 | Stop reason: SURG

## 2020-07-14 RX ORDER — CEFAZOLIN SODIUM 1 G/50ML
1000 SOLUTION INTRAVENOUS EVERY 8 HOURS
Status: DISCONTINUED | OUTPATIENT
Start: 2020-07-14 | End: 2020-07-14

## 2020-07-14 RX ORDER — CEFAZOLIN SODIUM 2 G/50ML
2000 SOLUTION INTRAVENOUS ONCE
Status: DISCONTINUED | OUTPATIENT
Start: 2020-07-14 | End: 2020-07-14

## 2020-07-14 RX ORDER — ACETAMINOPHEN 325 MG/1
650 TABLET ORAL EVERY 6 HOURS PRN
Status: DISCONTINUED | OUTPATIENT
Start: 2020-07-14 | End: 2020-07-15 | Stop reason: HOSPADM

## 2020-07-14 RX ORDER — ONDANSETRON 2 MG/ML
4 INJECTION INTRAMUSCULAR; INTRAVENOUS EVERY 6 HOURS PRN
Status: DISCONTINUED | OUTPATIENT
Start: 2020-07-14 | End: 2020-07-15 | Stop reason: HOSPADM

## 2020-07-14 RX ORDER — FENTANYL CITRATE 50 UG/ML
INJECTION, SOLUTION INTRAMUSCULAR; INTRAVENOUS AS NEEDED
Status: DISCONTINUED | OUTPATIENT
Start: 2020-07-14 | End: 2020-07-14 | Stop reason: SURG

## 2020-07-14 RX ORDER — LIDOCAINE HYDROCHLORIDE 20 MG/ML
INJECTION, SOLUTION EPIDURAL; INFILTRATION; INTRACAUDAL; PERINEURAL AS NEEDED
Status: DISCONTINUED | OUTPATIENT
Start: 2020-07-14 | End: 2020-07-14 | Stop reason: SURG

## 2020-07-14 RX ORDER — GLYCINE 1.5 G/100ML
SOLUTION IRRIGATION AS NEEDED
Status: DISCONTINUED | OUTPATIENT
Start: 2020-07-14 | End: 2020-07-14 | Stop reason: HOSPADM

## 2020-07-14 RX ORDER — CEFAZOLIN SODIUM 2 G/50ML
SOLUTION INTRAVENOUS AS NEEDED
Status: DISCONTINUED | OUTPATIENT
Start: 2020-07-14 | End: 2020-07-14 | Stop reason: SURG

## 2020-07-14 RX ORDER — ONDANSETRON 2 MG/ML
INJECTION INTRAMUSCULAR; INTRAVENOUS AS NEEDED
Status: DISCONTINUED | OUTPATIENT
Start: 2020-07-14 | End: 2020-07-14 | Stop reason: SURG

## 2020-07-14 RX ORDER — CEFAZOLIN SODIUM 1 G/50ML
1000 SOLUTION INTRAVENOUS EVERY 8 HOURS
Status: COMPLETED | OUTPATIENT
Start: 2020-07-14 | End: 2020-07-15

## 2020-07-14 RX ORDER — MAGNESIUM HYDROXIDE 1200 MG/15ML
LIQUID ORAL AS NEEDED
Status: DISCONTINUED | OUTPATIENT
Start: 2020-07-14 | End: 2020-07-14 | Stop reason: HOSPADM

## 2020-07-14 RX ORDER — CALCIUM CARBONATE 200(500)MG
1000 TABLET,CHEWABLE ORAL DAILY PRN
Status: DISCONTINUED | OUTPATIENT
Start: 2020-07-14 | End: 2020-07-15 | Stop reason: HOSPADM

## 2020-07-14 RX ORDER — FENTANYL CITRATE/PF 50 MCG/ML
25 SYRINGE (ML) INJECTION
Status: DISCONTINUED | OUTPATIENT
Start: 2020-07-14 | End: 2020-07-14 | Stop reason: HOSPADM

## 2020-07-14 RX ORDER — ONDANSETRON 2 MG/ML
4 INJECTION INTRAMUSCULAR; INTRAVENOUS ONCE AS NEEDED
Status: DISCONTINUED | OUTPATIENT
Start: 2020-07-14 | End: 2020-07-14 | Stop reason: HOSPADM

## 2020-07-14 RX ADMIN — MIDAZOLAM HYDROCHLORIDE 2 MG: 1 INJECTION, SOLUTION INTRAMUSCULAR; INTRAVENOUS at 07:39

## 2020-07-14 RX ADMIN — DEXAMETHASONE SODIUM PHOSPHATE 4 MG: 10 INJECTION, SOLUTION INTRAMUSCULAR; INTRAVENOUS at 07:53

## 2020-07-14 RX ADMIN — SODIUM CHLORIDE, SODIUM LACTATE, POTASSIUM CHLORIDE, AND CALCIUM CHLORIDE 125 ML/HR: .6; .31; .03; .02 INJECTION, SOLUTION INTRAVENOUS at 11:05

## 2020-07-14 RX ADMIN — DOCUSATE SODIUM 100 MG: 100 CAPSULE, LIQUID FILLED ORAL at 17:46

## 2020-07-14 RX ADMIN — CEFAZOLIN SODIUM 1000 MG: 1 SOLUTION INTRAVENOUS at 14:31

## 2020-07-14 RX ADMIN — STANDARDIZED SENNA CONCENTRATE 8.6 MG: 8.6 TABLET ORAL at 12:46

## 2020-07-14 RX ADMIN — SODIUM CHLORIDE, SODIUM LACTATE, POTASSIUM CHLORIDE, AND CALCIUM CHLORIDE: .6; .31; .03; .02 INJECTION, SOLUTION INTRAVENOUS at 07:38

## 2020-07-14 RX ADMIN — SODIUM CHLORIDE, SODIUM LACTATE, POTASSIUM CHLORIDE, AND CALCIUM CHLORIDE 125 ML/HR: .6; .31; .03; .02 INJECTION, SOLUTION INTRAVENOUS at 06:49

## 2020-07-14 RX ADMIN — SODIUM CHLORIDE, SODIUM LACTATE, POTASSIUM CHLORIDE, AND CALCIUM CHLORIDE 125 ML/HR: .6; .31; .03; .02 INJECTION, SOLUTION INTRAVENOUS at 19:54

## 2020-07-14 RX ADMIN — PROPOFOL 200 MG: 10 INJECTION, EMULSION INTRAVENOUS at 07:47

## 2020-07-14 RX ADMIN — CEFAZOLIN SODIUM 2000 MG: 2 SOLUTION INTRAVENOUS at 07:39

## 2020-07-14 RX ADMIN — CEFAZOLIN SODIUM 1000 MG: 1 SOLUTION INTRAVENOUS at 23:06

## 2020-07-14 RX ADMIN — FENTANYL CITRATE 50 MCG: 50 INJECTION, SOLUTION INTRAMUSCULAR; INTRAVENOUS at 07:46

## 2020-07-14 RX ADMIN — LIDOCAINE HYDROCHLORIDE 50 MG: 20 INJECTION, SOLUTION EPIDURAL; INFILTRATION; INTRACAUDAL; PERINEURAL at 07:46

## 2020-07-14 RX ADMIN — FENTANYL CITRATE 50 MCG: 50 INJECTION, SOLUTION INTRAMUSCULAR; INTRAVENOUS at 08:03

## 2020-07-14 RX ADMIN — ONDANSETRON 4 MG: 2 INJECTION INTRAMUSCULAR; INTRAVENOUS at 07:53

## 2020-07-14 RX ADMIN — DOCUSATE SODIUM 100 MG: 100 CAPSULE, LIQUID FILLED ORAL at 11:05

## 2020-07-14 NOTE — PLAN OF CARE
Problem: INFECTION - ADULT  Goal: Absence or prevention of progression during hospitalization  Description  INTERVENTIONS:  - Assess and monitor for signs and symptoms of infection  - Monitor lab/diagnostic results  - Monitor all insertion sites, i e  IV site, catheter   - [place] appropriate cooling/warming therapies per order  - Administer medications as ordered  - Instruct and encourage patient and family to use good hand hygiene technique  - Identify and instruct in appropriate isolation precautions for identified infection/condition   Outcome: Progressing     Problem: DISCHARGE PLANNING  Goal: Discharge to home or other facility with appropriate resources  Description  INTERVENTIONS:  - Identify barriers to discharge w/patient and caregiver  - Arrange for needed discharge resources and transportation as appropriate  - Identify discharge learning needs (meds, wound care, etc )  - Arrange for interpretive services to assist at discharge as needed  - Refer to Case Management Department for coordinating discharge planning if the patient needs post-hospital services based on physician/advanced practitioner order or complex needs related to functional status, cognitive ability, or social support system  Outcome: Progressing     Problem: GENITOURINARY - ADULT  Goal: Maintains or returns to baseline urinary function  Description  INTERVENTIONS:  - Assess urinary function  - Encourage oral fluids to ensure adequate hydration if ordered  - Administer IV fluids as ordered to ensure adequate hydration  - Administer ordered medications as needed  - Offer frequent toileting  - Follow urinary retention protocol if ordered  Outcome: Progressing  Goal: Urinary catheter remains patent  Description  INTERVENTIONS:  - Assess patency of urinary catheter  - Follow guidelines for intermittent irrigation of non-functioning urinary catheter   Outcome: Progressing

## 2020-07-14 NOTE — ANESTHESIA PREPROCEDURE EVALUATION
Review of Systems/Medical History  Patient summary reviewed  Chart reviewed      Cardiovascular  Negative cardio ROS    Pulmonary  Negative pulmonary ROS Smoker ex-smoker  ,        GI/Hepatic  Negative GI/hepatic ROS          Prostatic disorder, benign prostatic hyperplasia       Endo/Other  Negative endo/other ROS      GYN  Negative gynecology ROS          Hematology  Negative hematology ROS      Musculoskeletal  Negative musculoskeletal ROS        Neurology  Negative neurology ROS      Psychology   Negative psychology ROS              Physical Exam    Airway    Mallampati score: II  TM Distance: >3 FB  Neck ROM: full     Dental   No notable dental hx     Cardiovascular  Comment: Negative ROS, Rhythm: regular, Rate: normal, Cardiovascular exam normal    Pulmonary  Pulmonary exam normal Breath sounds clear to auscultation,     Other Findings        Anesthesia Plan  ASA Score- 2     Anesthesia Type- general with ASA Monitors  Additional Monitors:   Airway Plan: LMA  Plan Factors-    Induction- intravenous  Postoperative Plan- Plan for postoperative opioid use  Informed Consent- Anesthetic plan and risks discussed with patient  I personally reviewed this patient with the CRNA  Discussed and agreed on the Anesthesia Plan with the CRNA  Yolette Nieves

## 2020-07-14 NOTE — OP NOTE
OPERATIVE REPORT- Dr Opal Culp  PATIENT NAME: Gus Bangura    :  1950  MRN: [id number]  Pt Location: [facility] OR ROOM 04    SURGERY DATE: 2020  For  Surgeon: Bertin Cooper MD    Pre-op Diagnosis:  1  Residual prostate obstruction with prior TURP    Post-op Diagnosis:  1  Same    Procedure:  1  Cystoscopy with transurethral resection of residual tissue    Specimen(s):   ID Type Source Tests Collected by Time Destination   1 : send chips in formalin Tissue Prostate TISSUE EXAM Bertin Cooper MD 2020 [id number]        Estimated Blood Loss: Minimal    Complications: None    Drains:  1  [facility] three way Griffin    Anesthesia type:   General    Indications for surgery:  Patient with symptomatic voiding despite prior TURP  Was better immediately following the original resection  Getting worse  Cystoscopy shows prostate has either regrown or collapsed in on the left side and anteriorly  Findings:  1  Prostate obstruction both anteriorly and from the left lateral lobe    Procedure and Technique:   After obtaining consent and identifying the patient, antibiotics were given as ordered and the patient was brought to the room  All appropriate leads and monitors were placed and the patient was appropriately positioned on the table  Anesthesia was administered and the patient was sterilely prepped and draped  A timeout was performed where the patient name, , procedure, antibiotics, allergies, etc   were discussed  All in the room were satisfied before the start of the operative procedure  What follows are the operative findings and events  Cystoscopy was undertaken  There was obstruction as noted above  A yellow loop was used to resect the obstructing tissue  All the chips were removed  The ureters were uninvolved  Ejaculatory ducts and sphincter were preserved  Bleeding was controlled  A 22 Azeri catheter was placed and continuous irrigation was established    The procedure was terminated and the patient was awakened without incident and transferred to the PACU in satisfactory condition  Plan:  1  Patient will be monitored overnight in the hospital and the catheter will be removed in the morning if all goes well  If he is able to void appropriately he will be sent home  SIGNATURE: Chris Ritchie MD  DATE: July 14, 2020  TIME: 8:41 AM    Portions of the record may have been created with voice recognition software   Occasional wrong word or "sound alike" substitutions may have occurred due to the inherent limitations of voice recognition software   Read the chart carefully and recognize, using context, where substitutions have occurred

## 2020-07-14 NOTE — ANESTHESIA POSTPROCEDURE EVALUATION
Post-Op Assessment Note    CV Status:  Stable  Pain Score: 0    Pain management: adequate     Mental Status:  Sleepy and arousable   Hydration Status:  Euvolemic and stable   PONV Controlled:  Controlled   Airway Patency:  Patent   Post Op Vitals Reviewed: Yes      Staff: Anesthesiologist, CRNA   Comments: Report given to recovering RN, VSS< Pt resting comfortably          /78 (07/14/20 0833)    Temp 97 8 °F (36 6 °C) (07/14/20 0833)    Pulse 62 (07/14/20 0833)   Resp 12 (07/14/20 0833)    SpO2 98 % (07/14/20 0833)

## 2020-07-14 NOTE — INTERVAL H&P NOTE
Note reviewed and pt seen and examined with PA  Agree with assessment and plan  Slight temp elevation  Will watch  Not delay surgery

## 2020-07-15 VITALS
SYSTOLIC BLOOD PRESSURE: 141 MMHG | OXYGEN SATURATION: 97 % | HEART RATE: 75 BPM | WEIGHT: 216.38 LBS | RESPIRATION RATE: 19 BRPM | DIASTOLIC BLOOD PRESSURE: 90 MMHG | HEIGHT: 74 IN | TEMPERATURE: 97.8 F | BODY MASS INDEX: 27.77 KG/M2

## 2020-07-15 RX ORDER — CEFADROXIL 500 MG/1
500 CAPSULE ORAL 2 TIMES DAILY
Qty: 6 CAPSULE | Refills: 0 | Status: SHIPPED | OUTPATIENT
Start: 2020-07-15 | End: 2020-07-18

## 2020-07-15 RX ADMIN — DOCUSATE SODIUM 100 MG: 100 CAPSULE, LIQUID FILLED ORAL at 10:38

## 2020-07-15 RX ADMIN — STANDARDIZED SENNA CONCENTRATE 8.6 MG: 8.6 TABLET ORAL at 13:29

## 2020-07-15 RX ADMIN — SODIUM CHLORIDE, SODIUM LACTATE, POTASSIUM CHLORIDE, AND CALCIUM CHLORIDE 125 ML/HR: .6; .31; .03; .02 INJECTION, SOLUTION INTRAVENOUS at 04:33

## 2020-07-15 RX ADMIN — CEFAZOLIN SODIUM 1000 MG: 1 SOLUTION INTRAVENOUS at 06:37

## 2020-07-15 RX ADMIN — SODIUM CHLORIDE, SODIUM LACTATE, POTASSIUM CHLORIDE, AND CALCIUM CHLORIDE 125 ML/HR: .6; .31; .03; .02 INJECTION, SOLUTION INTRAVENOUS at 13:13

## 2020-07-15 NOTE — DISCHARGE INSTRUCTIONS
Transurethral Prostatectomy   WHAT YOU NEED TO KNOW:   A transurethral prostatectomy is surgery that is done to remove part or all of your prostate gland  This surgery is also called transurethral resection of the prostate (TURP)  DISCHARGE INSTRUCTIONS:   Medicines:   · Pain medicine: You may be given a prescription medicine to decrease pain  Do not wait until the pain is severe before you take this medicine  · Antibiotics: This medicine is given to fight or prevent an infection caused by bacteria  Always take your antibiotics exactly as ordered by your healthcare provider  Do not stop taking your medicine unless directed by your healthcare provider  Never save antibiotics or take leftover antibiotics that were given to you for another illness  · Take your medicine as directed  Contact your healthcare provider if you think your medicine is not helping or if you have side effects  Tell him or her if you are allergic to any medicine  Keep a list of the medicines, vitamins, and herbs you take  Include the amounts, and when and why you take them  Bring the list or the pill bottles to follow-up visits  Carry your medicine list with you in case of an emergency  Follow up with your healthcare provider or urologist as directed: You may need to return to make sure you do not have an infection, or to have your Griffin catheter removed  Write down your questions so you remember to ask them during your visits  Griffin catheter care: A Griffin catheter is a tube put into your bladder to drain your urine into a bag  Keep the bag of urine well below your waist  Lifting the urine bag higher will make the urine flow back into your bladder, which can cause an infection  Do not pull on the catheter  This may cause pain and bleeding, and the catheter may come out  Do not let the catheter tubing kink, because this will block the flow of urine   Ask for more information about how to care for yourself when you have a Griffin catheter in place  Bladder control:  After surgery, you may leak urine and have trouble controlling when you urinate  Ask for more information about the following ways to help decrease urine leakage:  · Avoid caffeine:  Caffeine can cause problems with bladder control and increase your need to urinate  · Do pelvic floor muscle exercises:  Pelvic floor muscle exercises may help improve your bladder control, if you leak urine  These exercises are done by tightening and relaxing your pelvic muscles  Ask how to do pelvic floor muscle exercises, and how often to do them  · Increased liquids:  Drink larger amounts of liquid throughout the day  · Wear a pad or adult diapers: These may help to absorb leaking urine and decrease the odor  Activity guidelines:  Ask when it is okay for you to return to work and activities, or to have sex  Contact your healthcare provider or urologist if:   · You have a fever  · You have new or more blood in your urine  · You have trouble starting to urinate, or have a weak stream of urine when you urinate  · You feel like you have a full bladder, even after you urinate  You may also leak urine  · You often wake up during the night to urinate  You may also feel the need to urinate right away  · You feel pain and burning when you urinate  · You feel pain or pressure in your lower abdomen  · Your urine looks cloudy, and smells bad  · You have trouble getting an erection or ejaculating  · You have questions or concerns about your condition or care  Seek care immediately or call 911 if:   · You urinate little or not at all  · You have severe abdominal or back pain  · You are dizzy or confused  · You have abdominal pain, nausea, and vomiting  · Your heartbeat is slower than usual   © 2017 [employer] Information is for End User's use only and may not be sold, redistributed or otherwise used for commercial purposes   All illustrations and images included in CareNotes® are the copyrighted property of A D A M , Inc  or Ramesh Conley  The above information is an  only  It is not intended as medical advice for individual conditions or treatments  Talk to your doctor, nurse or pharmacist before following any medical regimen to see if it is safe and effective for you

## 2020-07-15 NOTE — SOCIAL WORK
CM met with pt to discuss DCP  Pt declined needing any home care services  Griffin discontinued and per pt is voiding  Pt's family member to drive pt home at d/c   CM dept will remain available for any discharge needs

## 2020-07-15 NOTE — PLAN OF CARE
Problem: INFECTION - ADULT  Goal: Absence or prevention of progression during hospitalization  Description  INTERVENTIONS:  - Assess and monitor for signs and symptoms of infection  - Monitor lab/diagnostic results  - Monitor all insertion sites, i e  IV site, catheter   - [place] appropriate cooling/warming therapies per order  - Administer medications as ordered  - Instruct and encourage patient and family to use good hand hygiene technique  - Identify and instruct in appropriate isolation precautions for identified infection/condition   7/15/2020 1936 by Chrystal Hale RN  Outcome: Completed  7/15/2020 1555 by Chrystal Hale RN  Outcome: Progressing     Problem: DISCHARGE PLANNING  Goal: Discharge to home or other facility with appropriate resources  Description  INTERVENTIONS:  - Identify barriers to discharge w/patient and caregiver  - Arrange for needed discharge resources and transportation as appropriate  - Identify discharge learning needs (meds, wound care, etc )  - Arrange for interpretive services to assist at discharge as needed  - Refer to Case Management Department for coordinating discharge planning if the patient needs post-hospital services based on physician/advanced practitioner order or complex needs related to functional status, cognitive ability, or social support system  7/15/2020 1936 by Chrystal Hale RN  Outcome: Completed  7/15/2020 1555 by Chrystal Hale RN  Outcome: Progressing     Problem: GENITOURINARY - ADULT  Goal: Maintains or returns to baseline urinary function  Description  INTERVENTIONS:  - Assess urinary function  - Encourage oral fluids to ensure adequate hydration if ordered  - Administer IV fluids as ordered to ensure adequate hydration  - Administer ordered medications as needed  - Offer frequent toileting  - Follow urinary retention protocol if ordered  7/15/2020 1936 by Chrystal Hale RN  Outcome: Completed  7/15/2020 1555 by Chrystal Hale RN  Outcome: Progressing  Goal: Urinary catheter remains patent  Description  INTERVENTIONS:  - Assess patency of urinary catheter  - Follow guidelines for intermittent irrigation of non-functioning urinary catheter   7/15/2020 1936 by Mariajose Peguero RN  Outcome: Completed  7/15/2020 1555 by Mariajose Peguero RN  Outcome: Progressing

## 2020-07-15 NOTE — DISCHARGE SUMMARY
Discharge Summary - Yola Barlow 78 y o  male MRN: [id number]    Unit/Bed#: [address] [place] [address] Encounter: [id number]    Admission Date: 7/14/2020     Discharge Date: 07/15/2020    Attending:Bharathi Coleman MD     Consultants:     Admitting Diagnosis: Benign prostatic hyperplasia with lower urinary tract symptoms [N40 1]    Principle/ Secondary Diagnosis:  Past Medical History:   Diagnosis Date    Cancer [facility]) 2011    melanoma of skin of left shoulder  basal  cell of face7/2/20    Colon polyp     Dental abscess     started  4 weeks ago  on RX    Enlarged prostate     Urinary bladder disorder      Past Surgical History:   Procedure Laterality Date    CATARACT EXTRACTION      COLONOSCOPY      HERNIA REPAIR      MASS EXCISION      melanoma of left shoulder     UT TRANSURETHRAL ELEC-SURG PROSTATECTOM N/A 10/8/2019    Procedure: CYSTOSCOPY, TRANSURETHRAL RESECTION OF PROSTATE (TURP); Surgeon: Lorraine Goldman MD;  Location: [address] [place] [address];  Service: Urology    UT XCAPSL CTRC RMVL INSJ IO LENS PROSTH W/O ECP Right 6/15/2020    Procedure: EXTRACTION EXTRACAPSULAR CATARACT PHACO INTRAOCULAR LENS (IOL); Surgeon: Johnie Gimenez MD;  Location: [facility] MAIN OR;  Service: Ophthalmology    TONSILLECTOMY         Procedures Performed: No orders of the defined types were placed in this encounter  UT REMV RESID OBSTRUC PROSTATE,>1 YR [32364] (CYSTOSCOPY, TRANSURETHRAL RESECTION OF RESIDUAL OR REGROWTH OF OBSTRUCTIVE PROSTATE TISSUE)  CYSTOSCOPY, TRANSURETHRAL RESECTION OF RESIDUAL OR REGROWTH OF OBSTRUCTIVE PROSTATE TISSUE (N/A Abdomen)  Procedure(s):  CYSTOSCOPY, TRANSURETHRAL RESECTION OF RESIDUAL OR REGROWTH OF OBSTRUCTIVE PROSTATE TISSUE    Laboratory data at discharge:   No results found for this or any previous visit (from the past 36 hour(s))                 Invalid input(s): LABGLOM          Discharge instructions/Information to patient and family:   See after visit summary for information provided to patient and family  Discharge Medications:  See after visit summary for reconciled discharge medications provided to patient and family  Hospital Course:  72-year-old gentleman with a history of obstructive BPH and urinary retention underwent a previous TURP  Patient continued to have a rise in residuals as an outpatient and repeat cystoscopy showed recurrence blockage  Patient underwent repeat TURP 07/14/2020  The patient had a Griffin catheter removed the following day and was able to void 300 cc and his PVR was approximately 147 mL  His PVR is an improvement from his prior baseline  The urine was mostly clear/yellow with no significant bleeding present at all  Patient is feeling well and denies any chest pain, shortness of breath, suprapubic pain or discomfort, or calf pain    Condition at Discharge: Good     Provisions for Follow-Up Care:  See after visit summary for information related to follow-up care and any pertinent home health orders  Disposition: Home    Planned Readmission: No    Discharge Statement   I spent 22 minutes discharging the patient  This time was spent on the day of discharge  I had direct contact with the patient on the day of discharge  Additional documentation is required if more than 30 minutes were spent on discharge  Portions of the record may have been created with voice recognition software   Occasional wrong word or "sound alike" substitutions may have occurred due to the inherent limitations of voice recognition software   Read the chart carefully and recognize, using context, where substitutions have occurred

## 2020-07-15 NOTE — PLAN OF CARE
Problem: INFECTION - ADULT  Goal: Absence or prevention of progression during hospitalization  Description  INTERVENTIONS:  - Assess and monitor for signs and symptoms of infection  - Monitor lab/diagnostic results  - Monitor all insertion sites, i e  IV site, catheter   - [place] appropriate cooling/warming therapies per order  - Administer medications as ordered  - Instruct and encourage patient and family to use good hand hygiene technique  - Identify and instruct in appropriate isolation precautions for identified infection/condition   Outcome: Progressing     Problem: DISCHARGE PLANNING  Goal: Discharge to home or other facility with appropriate resources  Description  INTERVENTIONS:  - Identify barriers to discharge w/patient and caregiver  - Arrange for needed discharge resources and transportation as appropriate  - Identify discharge learning needs (meds, wound care, etc )  - Arrange for interpretive services to assist at discharge as needed  - Refer to Case Management Department for coordinating discharge planning if the patient needs post-hospital services based on physician/advanced practitioner order or complex needs related to functional status, cognitive ability, or social support system  Outcome: Progressing     Problem: GENITOURINARY - ADULT  Goal: Maintains or returns to baseline urinary function  Description  INTERVENTIONS:  - Assess urinary function  - Encourage oral fluids to ensure adequate hydration if ordered  - Administer IV fluids as ordered to ensure adequate hydration  - Administer ordered medications as needed  - Offer frequent toileting  - Follow urinary retention protocol if ordered  Outcome: Progressing  Goal: Urinary catheter remains patent  Description  INTERVENTIONS:  - Assess patency of urinary catheter  - Follow guidelines for intermittent irrigation of non-functioning urinary catheter   Outcome: Progressing

## 2020-07-27 DIAGNOSIS — N40.0 ENLARGED PROSTATE WITHOUT LOWER URINARY TRACT SYMPTOMS (LUTS): ICD-10-CM

## 2020-07-27 RX ORDER — TAMSULOSIN HYDROCHLORIDE 0.4 MG/1
0.4 CAPSULE ORAL
Qty: 90 CAPSULE | Refills: 0 | Status: SHIPPED | OUTPATIENT
Start: 2020-07-27 | End: 2020-11-02 | Stop reason: SDUPTHER

## 2020-10-20 ENCOUNTER — OFFICE VISIT (OUTPATIENT)
Dept: FAMILY MEDICINE CLINIC | Facility: CLINIC | Age: 70
End: 2020-10-20
Payer: COMMERCIAL

## 2020-10-20 ENCOUNTER — APPOINTMENT (OUTPATIENT)
Dept: RADIOLOGY | Facility: CLINIC | Age: 70
End: 2020-10-20
Payer: COMMERCIAL

## 2020-10-20 VITALS
HEART RATE: 76 BPM | SYSTOLIC BLOOD PRESSURE: 122 MMHG | DIASTOLIC BLOOD PRESSURE: 84 MMHG | RESPIRATION RATE: 16 BRPM | BODY MASS INDEX: 28.23 KG/M2 | TEMPERATURE: 97.3 F | HEIGHT: 74 IN | WEIGHT: 220 LBS

## 2020-10-20 DIAGNOSIS — R07.81 RIB PAIN: ICD-10-CM

## 2020-10-20 DIAGNOSIS — M79.671 PAIN IN BOTH FEET: ICD-10-CM

## 2020-10-20 DIAGNOSIS — M79.672 PAIN IN BOTH FEET: ICD-10-CM
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
## 2020-10-20 PROBLEM — J01.00 ACUTE NON-RECURRENT MAXILLARY SINUSITIS: Status: RESOLVED | Noted: 2019-12-31 | Resolved: 2020-10-20

## 2020-10-20 PROBLEM — N40.0 HYPERTROPHY OF PROSTATE WITHOUT URINARY OBSTRUCTION AND OTHER LOWER URINARY TRACT SYMPTOMS (LUTS): Status: ACTIVE | Noted: 2020-10-20

## 2020-10-20 PROBLEM — R19.5 FECES CONTENTS ABNORMAL: Status: ACTIVE | Noted: 2020-10-20

## 2020-10-20 PROCEDURE — 71111 X-RAY EXAM RIBS/CHEST4/> VWS: CPT

## 2020-10-20 PROCEDURE — 99214 OFFICE O/P EST MOD 30 MIN: CPT | Performed by: FAMILY MEDICINE

## 2020-10-20 PROCEDURE — 73630 X-RAY EXAM OF FOOT: CPT

## 2020-10-20 RX ORDER — METHYLPREDNISOLONE 4 MG/1
TABLET ORAL
Qty: 21 TABLET | Refills: 0 | Status: SHIPPED | OUTPATIENT
Start: 2020-10-20 | End: 2020-10-26

## 2020-10-20 RX ORDER — CYCLOBENZAPRINE HCL 10 MG
10 TABLET ORAL
Qty: 30 TABLET | Refills: 0 | Status: SHIPPED | OUTPATIENT
Start: 2020-10-20 | End: 2020-11-02

## 2020-10-27 ENCOUNTER — APPOINTMENT (EMERGENCY)
Dept: CT IMAGING | Facility: HOSPITAL | Age: 70
End: 2020-10-27
Payer: COMMERCIAL

## 2020-10-27 ENCOUNTER — HOSPITAL ENCOUNTER (EMERGENCY)
Facility: HOSPITAL | Age: 70
Discharge: HOME | End: 2020-10-27
Attending: EMERGENCY MEDICINE
Payer: COMMERCIAL

## 2020-10-27 VITALS
OXYGEN SATURATION: 96 % | TEMPERATURE: 98.2 F | HEART RATE: 85 BPM | DIASTOLIC BLOOD PRESSURE: 96 MMHG | SYSTOLIC BLOOD PRESSURE: 123 MMHG | RESPIRATION RATE: 16 BRPM | WEIGHT: 220 LBS | BODY MASS INDEX: 28.23 KG/M2 | HEIGHT: 74 IN

## 2020-10-27 DIAGNOSIS — S32.040A CLOSED COMPRESSION FRACTURE OF L4 LUMBAR VERTEBRA, INITIAL ENCOUNTER (HCC): ICD-10-CM
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
## 2020-10-27 PROCEDURE — 72131 CT LUMBAR SPINE W/O DYE: CPT

## 2020-10-27 PROCEDURE — G1004 CDSM NDSC: HCPCS

## 2020-10-27 PROCEDURE — 97760 ORTHOTIC MGMT&TRAING 1ST ENC: CPT

## 2020-10-27 PROCEDURE — 99284 EMERGENCY DEPT VISIT MOD MDM: CPT

## 2020-10-27 PROCEDURE — 72128 CT CHEST SPINE W/O DYE: CPT

## 2020-10-27 PROCEDURE — 97162 PT EVAL MOD COMPLEX 30 MIN: CPT

## 2020-10-27 PROCEDURE — 99284 EMERGENCY DEPT VISIT MOD MDM: CPT | Performed by: PHYSICIAN ASSISTANT

## 2020-10-27 RX ORDER — LIDOCAINE 50 MG/G
2 PATCH TOPICAL ONCE
Status: DISCONTINUED | OUTPATIENT
Start: 2020-10-27 | End: 2020-10-27 | Stop reason: HOSPADM

## 2020-10-27 RX ORDER — ACETAMINOPHEN 325 MG/1
975 TABLET ORAL ONCE
Status: COMPLETED | OUTPATIENT
Start: 2020-10-27 | End: 2020-10-27

## 2020-10-27 RX ADMIN — ACETAMINOPHEN 975 MG: 325 TABLET, FILM COATED ORAL at 09:15

## 2020-10-27 RX ADMIN — LIDOCAINE 5% 2 PATCH: 700 PATCH TOPICAL at 09:14

## 2020-11-02 ENCOUNTER — OFFICE VISIT (OUTPATIENT)
Dept: OBGYN CLINIC | Facility: CLINIC | Age: 70
End: 2020-11-02
Payer: MEDICARE

## 2020-11-02 VITALS
DIASTOLIC BLOOD PRESSURE: 87 MMHG | SYSTOLIC BLOOD PRESSURE: 137 MMHG | RESPIRATION RATE: 20 BRPM | HEART RATE: 79 BPM | HEIGHT: 74 IN | WEIGHT: 222.4 LBS | BODY MASS INDEX: 28.54 KG/M2 | TEMPERATURE: 97.2 F

## 2020-11-02 DIAGNOSIS — N40.0 ENLARGED PROSTATE WITHOUT LOWER URINARY TRACT SYMPTOMS (LUTS): ICD-10-CM

## 2020-11-02 DIAGNOSIS — S92.302A CLOSED FRACTURE OF NECK OF METATARSAL BONE OF LEFT FOOT, INITIAL ENCOUNTER: ICD-10-CM

## 2020-11-02 DIAGNOSIS — M79.672 PAIN IN LEFT FOOT: ICD-10-CM

## 2020-11-02 DIAGNOSIS — M54.50 ACUTE LOW BACK PAIN WITHOUT SCIATICA, UNSPECIFIED BACK PAIN LATERALITY: Primary | ICD-10-CM

## 2020-11-02 DIAGNOSIS — S32.040A COMPRESSION FRACTURE OF L4 VERTEBRA, INITIAL ENCOUNTER (HCC): ICD-10-CM

## 2020-11-02 PROCEDURE — 99213 OFFICE O/P EST LOW 20 MIN: CPT | Performed by: ORTHOPAEDIC SURGERY

## 2020-11-02 RX ORDER — TAMSULOSIN HYDROCHLORIDE 0.4 MG/1
0.4 CAPSULE ORAL
Qty: 90 CAPSULE | Refills: 0 | Status: SHIPPED | OUTPATIENT
Start: 2020-11-02 | End: 2020-11-03 | Stop reason: SDUPTHER

## 2020-11-03 DIAGNOSIS — N40.0 ENLARGED PROSTATE WITHOUT LOWER URINARY TRACT SYMPTOMS (LUTS): ICD-10-CM

## 2020-11-03 RX ORDER — TAMSULOSIN HYDROCHLORIDE 0.4 MG/1
0.4 CAPSULE ORAL
Qty: 90 CAPSULE | Refills: 0 | Status: SHIPPED | OUTPATIENT
Start: 2020-11-03 | End: 2020-12-07 | Stop reason: SDUPTHER

## 2020-11-30 ENCOUNTER — OFFICE VISIT (OUTPATIENT)
Dept: OBGYN CLINIC | Facility: CLINIC | Age: 70
End: 2020-11-30
Payer: MEDICARE

## 2020-11-30 ENCOUNTER — APPOINTMENT (OUTPATIENT)
Dept: RADIOLOGY | Facility: CLINIC | Age: 70
End: 2020-11-30
Payer: COMMERCIAL

## 2020-11-30 VITALS
DIASTOLIC BLOOD PRESSURE: 81 MMHG | HEIGHT: 76 IN | WEIGHT: 227.2 LBS | BODY MASS INDEX: 27.67 KG/M2 | HEART RATE: 68 BPM | RESPIRATION RATE: 18 BRPM | SYSTOLIC BLOOD PRESSURE: 120 MMHG

## 2020-11-30 DIAGNOSIS — S92.302A CLOSED FRACTURE OF NECK OF METATARSAL BONE OF LEFT FOOT, INITIAL ENCOUNTER: ICD-10-CM

## 2020-11-30 DIAGNOSIS — M79.671 PAIN IN RIGHT FOOT: ICD-10-CM

## 2020-11-30 DIAGNOSIS — R60.0 BILATERAL LOWER EXTREMITY EDEMA: ICD-10-CM

## 2020-11-30 DIAGNOSIS — S32.040A COMPRESSION FRACTURE OF L4 VERTEBRA, INITIAL ENCOUNTER (HCC): Primary | ICD-10-CM

## 2020-11-30 DIAGNOSIS — S32.040A COMPRESSION FRACTURE OF L4 VERTEBRA, INITIAL ENCOUNTER (HCC): ICD-10-CM

## 2020-11-30 PROCEDURE — 73630 X-RAY EXAM OF FOOT: CPT

## 2020-11-30 PROCEDURE — 72100 X-RAY EXAM L-S SPINE 2/3 VWS: CPT

## 2020-11-30 PROCEDURE — 99213 OFFICE O/P EST LOW 20 MIN: CPT | Performed by: ORTHOPAEDIC SURGERY

## 2020-12-05 PROBLEM — Z00.00 MEDICARE ANNUAL WELLNESS VISIT, INITIAL: Status: RESOLVED | Noted: 2019-09-04 | Resolved: 2020-12-05

## 2020-12-05 PROBLEM — N32.89 CALCIFICATION OF BLADDER: Status: RESOLVED | Noted: 2017-04-28 | Resolved: 2020-12-05

## 2020-12-07 ENCOUNTER — OFFICE VISIT (OUTPATIENT)
Dept: FAMILY MEDICINE CLINIC | Facility: CLINIC | Age: 70
End: 2020-12-07
Payer: MEDICARE

## 2020-12-07 VITALS
WEIGHT: 226 LBS | TEMPERATURE: 98.5 F | SYSTOLIC BLOOD PRESSURE: 130 MMHG | RESPIRATION RATE: 16 BRPM | BODY MASS INDEX: 27.52 KG/M2 | DIASTOLIC BLOOD PRESSURE: 80 MMHG | OXYGEN SATURATION: 95 % | HEART RATE: 64 BPM | HEIGHT: 76 IN

## 2020-12-07 DIAGNOSIS — N40.0 ENLARGED PROSTATE WITHOUT LOWER URINARY TRACT SYMPTOMS (LUTS): ICD-10-CM

## 2020-12-07 DIAGNOSIS — N40.0 BENIGN PROSTATIC HYPERPLASIA WITHOUT LOWER URINARY TRACT SYMPTOMS: ICD-10-CM

## 2020-12-07 DIAGNOSIS — R60.0 LEG EDEMA, LEFT: Primary | ICD-10-CM

## 2020-12-07 DIAGNOSIS — J30.9 ALLERGIC RHINITIS, UNSPECIFIED SEASONALITY, UNSPECIFIED TRIGGER: ICD-10-CM

## 2020-12-07 DIAGNOSIS — Z12.11 COLON CANCER SCREENING: ICD-10-CM
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
## 2020-12-07 PROCEDURE — 99214 OFFICE O/P EST MOD 30 MIN: CPT | Performed by: FAMILY MEDICINE

## 2020-12-07 RX ORDER — TAMSULOSIN HYDROCHLORIDE 0.4 MG/1
0.4 CAPSULE ORAL
Qty: 90 CAPSULE | Refills: 1 | Status: SHIPPED | OUTPATIENT
Start: 2020-12-07 | End: 2021-01-11 | Stop reason: SDUPTHER

## 2020-12-11 ENCOUNTER — HOSPITAL ENCOUNTER (OUTPATIENT)
Dept: VASCULAR ULTRASOUND | Facility: HOSPITAL | Age: 70
Discharge: HOME | End: 2020-12-11
Payer: MEDICARE

## 2020-12-11 DIAGNOSIS — R60.0 LEG EDEMA, LEFT: ICD-10-CM

## 2020-12-11 PROCEDURE — 93970 EXTREMITY STUDY: CPT

## 2020-12-11 PROCEDURE — 93970 EXTREMITY STUDY: CPT | Performed by: SURGERY

## 2021-01-11 ENCOUNTER — OFFICE VISIT (OUTPATIENT)
Dept: OBGYN CLINIC | Facility: CLINIC | Age: 71
End: 2021-01-11
Payer: MEDICARE

## 2021-01-11 ENCOUNTER — APPOINTMENT (OUTPATIENT)
Dept: RADIOLOGY | Facility: CLINIC | Age: 71
End: 2021-01-11
Payer: MEDICARE

## 2021-01-11 VITALS
SYSTOLIC BLOOD PRESSURE: 113 MMHG | DIASTOLIC BLOOD PRESSURE: 74 MMHG | WEIGHT: 226 LBS | HEIGHT: 74 IN | BODY MASS INDEX: 29 KG/M2 | HEART RATE: 59 BPM

## 2021-01-11 DIAGNOSIS — M79.672 PAIN IN LEFT FOOT: ICD-10-CM

## 2021-01-11 DIAGNOSIS — G89.29 CHRONIC RIGHT-SIDED LOW BACK PAIN WITH RIGHT-SIDED SCIATICA: ICD-10-CM

## 2021-01-11 DIAGNOSIS — N40.0 ENLARGED PROSTATE WITHOUT LOWER URINARY TRACT SYMPTOMS (LUTS): ICD-10-CM

## 2021-01-11 DIAGNOSIS — M54.41 CHRONIC RIGHT-SIDED LOW BACK PAIN WITH RIGHT-SIDED SCIATICA: ICD-10-CM

## 2021-01-11 DIAGNOSIS — S32.040D COMPRESSION FRACTURE OF L4 VERTEBRA WITH ROUTINE HEALING, SUBSEQUENT ENCOUNTER: Primary | ICD-10-CM

## 2021-01-11 DIAGNOSIS — M79.671 PAIN IN RIGHT FOOT: ICD-10-CM

## 2021-01-11 PROCEDURE — 99213 OFFICE O/P EST LOW 20 MIN: CPT | Performed by: ORTHOPAEDIC SURGERY

## 2021-01-11 PROCEDURE — 73630 X-RAY EXAM OF FOOT: CPT

## 2021-01-11 NOTE — PROGRESS NOTES
Patient Name:  Sally Rivera  MRN:  [id number]    Assessment & Plan     1  Compression fracture of L4 vertebra with routine healing, subsequent encounter    2  Pain in left foot  -     XR foot 3+ vw left; Future; Expected date: 01/11/2021    3  Pain in right foot    4  Chronic right-sided low back pain with right-sided sciatica        Patient notes improved bilateral foot pain but has continued low back pain which has been worsening on his right side and radiating to his buttock  He recently stubbed his left small toe but new x-rays show no new fracture  I have ordered comprehensive spine physical therapy to work on core strengthening and stretching for his back  I have also ordered an MRI for further evaluation of his fracture  I will see him back in the office after his MRI for discussion of the results  History of the Present Illness   Sally Rivera is a 78 y o  adult with improved bilateral foot pain and low back pain  He does report worsening right-sided low back pain which radiates to his buttock region  It is worse with activity including stepping on the right side  He denies any numbness or tingling down his legs  He denies any bowel or bladder incontinence  Though his feet have been doing well, he reports stubbing his left small toe last night and now has ecchymosis and swelling in the foot  He denies any other new issues  Review of Systems     Review of Systems   Constitutional: Negative for appetite change and unexpected weight change  HENT: Negative for congestion and trouble swallowing  Eyes: Negative for visual disturbance  Respiratory: Negative for cough and shortness of breath  Cardiovascular: Negative for chest pain and palpitations  Gastrointestinal: Negative for nausea and vomiting  Endocrine: Negative for cold intolerance and heat intolerance  Musculoskeletal: Negative for gait problem and myalgias  Skin: Negative for rash     Neurological: Negative for numbness  Physical Exam     /74   Pulse 59   Ht 6' 2" (1 88 m)   Wt 103 kg (226 lb)   BMI 29 02 kg/m²     There is no midline tenderness present  There is right-sided paraspinal tenderness extending into the buttock region  There is mild pain with forward flexion and gentle extension   Sensation intact to light touch L2 through S1 dermatomes bilaterally  5/5 strength bilateral iliopsoas, quadriceps, tibialis anterior, extensor hallucis longus, and gastrocsoleus  There is bilateral hamstring tightness noted  Negative clonus bilaterally  Negative Babinski bilaterally  Straight leg raise test is equivocal on the right  The patient is well perfused distally  Data Review     I have personally reviewed pertinent films in PACS, and my interpretation follows  X-rays of the left foot reviewed today and reveal healing 5th metatarsal neck fracture  No new fracture dislocation noted      Social History     Tobacco Use    Smoking status: Former Smoker     Packs/day: 1 00     Types: Cigarettes     Quit date: 2016     Years since quittin 4    Smokeless tobacco: Never Used   Substance Use Topics    Alcohol use: Never     Frequency: Never    Drug use: Never           Procedures    Beverly Wakefield DO

## 2021-01-12 RX ORDER — TAMSULOSIN HYDROCHLORIDE 0.4 MG/1
0.4 CAPSULE ORAL
Qty: 90 CAPSULE | Refills: 1 | Status: SHIPPED | OUTPATIENT
Start: 2021-01-12 | End: 2021-07-16 | Stop reason: SDUPTHER

## 2021-01-20 ENCOUNTER — HOSPITAL ENCOUNTER (OUTPATIENT)
Dept: MRI IMAGING | Facility: HOSPITAL | Age: 71
Discharge: HOME | End: 2021-01-20
Attending: ORTHOPAEDIC SURGERY
Payer: COMMERCIAL

## 2021-01-20 DIAGNOSIS — S32.040D COMPRESSION FRACTURE OF L4 VERTEBRA WITH ROUTINE HEALING, SUBSEQUENT ENCOUNTER: ICD-10-CM

## 2021-01-20 DIAGNOSIS — M54.41 CHRONIC RIGHT-SIDED LOW BACK PAIN WITH RIGHT-SIDED SCIATICA: ICD-10-CM

## 2021-01-20 DIAGNOSIS — G89.29 CHRONIC RIGHT-SIDED LOW BACK PAIN WITH RIGHT-SIDED SCIATICA: ICD-10-CM

## 2021-01-20 PROCEDURE — G1004 CDSM NDSC: HCPCS

## 2021-01-20 PROCEDURE — 72148 MRI LUMBAR SPINE W/O DYE: CPT

## 2021-01-25 ENCOUNTER — OFFICE VISIT (OUTPATIENT)
Dept: OBGYN CLINIC | Facility: CLINIC | Age: 71
End: 2021-01-25
Payer: MEDICARE

## 2021-01-25 VITALS
DIASTOLIC BLOOD PRESSURE: 83 MMHG | HEART RATE: 73 BPM | BODY MASS INDEX: 28.36 KG/M2 | WEIGHT: 221 LBS | SYSTOLIC BLOOD PRESSURE: 134 MMHG | HEIGHT: 74 IN

## 2021-01-25 DIAGNOSIS — M51.36 DDD (DEGENERATIVE DISC DISEASE), LUMBAR: ICD-10-CM

## 2021-01-25 DIAGNOSIS — S32.040D COMPRESSION FRACTURE OF L4 VERTEBRA WITH ROUTINE HEALING, SUBSEQUENT ENCOUNTER: Primary | ICD-10-CM

## 2021-01-25 PROCEDURE — 99213 OFFICE O/P EST LOW 20 MIN: CPT | Performed by: ORTHOPAEDIC SURGERY

## 2021-01-25 NOTE — PROGRESS NOTES
Patient Name:  Sonja Kunz  MRN:  [id number]    Assessment & Plan     1  Compression fracture of L4 vertebra with routine healing, subsequent encounter    2  DDD (degenerative disc disease), lumbar        Patient's back pain is symptomatically improving and he has no neurologic symptoms  We went over his MRI in the office today  We discussed comprehensive spine physical therapy which was ordered at his last visit  The patient will schedule an appointment with physical therapy to work on core strengthening range of motion  He should also refrain from any heavy lifting at this time  He may resume activities at his gym but focus on light weights and avoiding compromising positions involving bending or twisting especially with heavy loads  I will see him back in 6 weeks for repeat evaluation  If his symptoms persist or worsen we will consider referral to Pain Management for evaluation  History of the Present Illness   Sonja Kunz is a 78 y o  adult with improved lower back pain  Patient states he has not recently had any radiating lower back pain  He presents to the office today for follow-up of MRI  He denies numbness, tingling, and loss of bowel or bladder control  Review of Systems     Review of Systems   Constitutional: Negative for appetite change and unexpected weight change  HENT: Negative for congestion and trouble swallowing  Eyes: Negative for visual disturbance  Respiratory: Negative for cough and shortness of breath  Cardiovascular: Negative for chest pain and palpitations  Gastrointestinal: Negative for nausea and vomiting  Endocrine: Negative for cold intolerance and heat intolerance  Musculoskeletal: Negative for gait problem and myalgias  Skin: Negative for rash  Neurological: Negative for numbness  Physical Exam     /83   Pulse 73   Ht 6' 2" (1 88 m)   Wt 100 kg (221 lb)   BMI 28 37 kg/m²     There is no midline tenderness present    There is no paraspinal tenderness  Sensation intact to light touch L2 through S1 dermatomes bilaterally  5/5 strength bilateral iliopsoas, quadriceps, tibialis anterior, extensor hallucis longus, and gastrocsoleus  Negative clonus bilaterally  Negative Babinski bilaterally  Straight leg raise test is negative  The patient is well perfused distally  Data Review     I have personally reviewed pertinent films in PACS, and my interpretation follows  MRI of the lumbar spine performed on 2021 demonstrates chronic compression fractures at L1, L5, and T12 with subacute fracture noted at L4  Multi level degenerative changes are present with associated multilevel foraminal and spinal stenosis      Social History     Tobacco Use    Smoking status: Former Smoker     Packs/day: 1 00     Types: Cigarettes     Quit date: 2016     Years since quittin 4    Smokeless tobacco: Never Used   Substance Use Topics    Alcohol use: Never     Frequency: Never    Drug use: Never           Procedures    Scribe Attestation    I,:  Jaclyn Batista am acting as a scribe while in the presence of the attending physician :       I,:  Jorge Cole DO personally performed the services described in this documentation    as scribed in my presence :

## 2021-02-26 ENCOUNTER — TELEPHONE (OUTPATIENT)
Dept: FAMILY MEDICINE CLINIC | Facility: CLINIC | Age: 71
End: 2021-02-26

## 2021-03-02 NOTE — TELEPHONE ENCOUNTER
He is not ready to schedule at this time  He has some things going on    He had an apt and had to cancel for today

## 2021-03-08 ENCOUNTER — OFFICE VISIT (OUTPATIENT)
Dept: OBGYN CLINIC | Facility: CLINIC | Age: 71
End: 2021-03-08
Payer: COMMERCIAL

## 2021-03-08 VITALS
WEIGHT: 216 LBS | HEART RATE: 80 BPM | SYSTOLIC BLOOD PRESSURE: 115 MMHG | BODY MASS INDEX: 27.72 KG/M2 | RESPIRATION RATE: 18 BRPM | HEIGHT: 74 IN | DIASTOLIC BLOOD PRESSURE: 77 MMHG

## 2021-03-08 DIAGNOSIS — S32.040D COMPRESSION FRACTURE OF L4 VERTEBRA WITH ROUTINE HEALING, SUBSEQUENT ENCOUNTER: Primary | ICD-10-CM

## 2021-03-08 DIAGNOSIS — M51.36 DDD (DEGENERATIVE DISC DISEASE), LUMBAR: ICD-10-CM

## 2021-03-08 PROCEDURE — 99213 OFFICE O/P EST LOW 20 MIN: CPT | Performed by: ORTHOPAEDIC SURGERY
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
## 2021-04-15 ENCOUNTER — APPOINTMENT (EMERGENCY)
Dept: RADIOLOGY | Facility: HOSPITAL | Age: 71
End: 2021-04-15
Payer: MEDICARE

## 2021-04-15 ENCOUNTER — HOSPITAL ENCOUNTER (EMERGENCY)
Facility: HOSPITAL | Age: 71
Discharge: HOME | End: 2021-04-15
Attending: EMERGENCY MEDICINE | Admitting: EMERGENCY MEDICINE
Payer: MEDICARE

## 2021-04-15 ENCOUNTER — OFFICE VISIT (OUTPATIENT)
Dept: FAMILY MEDICINE CLINIC | Facility: CLINIC | Age: 71
End: 2021-04-15
Payer: MEDICARE

## 2021-04-15 VITALS
HEART RATE: 61 BPM | HEIGHT: 74 IN | RESPIRATION RATE: 18 BRPM | BODY MASS INDEX: 28.23 KG/M2 | WEIGHT: 220 LBS | SYSTOLIC BLOOD PRESSURE: 159 MMHG | OXYGEN SATURATION: 95 % | TEMPERATURE: 98.5 F | DIASTOLIC BLOOD PRESSURE: 89 MMHG

## 2021-04-15 VITALS
BODY MASS INDEX: 28.23 KG/M2 | OXYGEN SATURATION: 95 % | DIASTOLIC BLOOD PRESSURE: 80 MMHG | SYSTOLIC BLOOD PRESSURE: 130 MMHG | WEIGHT: 220 LBS | HEIGHT: 74 IN | RESPIRATION RATE: 16 BRPM | TEMPERATURE: 98.5 F | HEART RATE: 81 BPM

## 2021-04-15 DIAGNOSIS — Z13.6 SCREENING FOR CARDIOVASCULAR, RESPIRATORY, AND GENITOURINARY DISEASES: ICD-10-CM

## 2021-04-15 DIAGNOSIS — Z85.820 HISTORY OF MALIGNANT MELANOMA OF SKIN: ICD-10-CM

## 2021-04-15 DIAGNOSIS — Z13.83 SCREENING FOR CARDIOVASCULAR, RESPIRATORY, AND GENITOURINARY DISEASES: ICD-10-CM

## 2021-04-15 DIAGNOSIS — R60.0 BILATERAL LOWER EXTREMITY EDEMA: Primary | ICD-10-CM

## 2021-04-15 DIAGNOSIS — N40.0 BENIGN PROSTATIC HYPERPLASIA WITHOUT LOWER URINARY TRACT SYMPTOMS: ICD-10-CM

## 2021-04-15 DIAGNOSIS — Z13.89 SCREENING FOR CARDIOVASCULAR, RESPIRATORY, AND GENITOURINARY DISEASES: ICD-10-CM

## 2021-04-15 DIAGNOSIS — R60.0 LEG EDEMA, LEFT: ICD-10-CM

## 2021-04-15 DIAGNOSIS — Z13.1 SCREENING FOR DIABETES MELLITUS (DM): ICD-10-CM

## 2021-04-15 DIAGNOSIS — Z00.00 MEDICARE ANNUAL WELLNESS VISIT, SUBSEQUENT: Primary | ICD-10-CM

## 2021-04-15 DIAGNOSIS — Z12.5 PROSTATE CANCER SCREENING: ICD-10-CM

## 2021-04-15 PROBLEM — R19.5 FECES CONTENTS ABNORMAL: Status: RESOLVED | Noted: 2020-10-20 | Resolved: 2021-04-15

## 2021-04-15 LAB
ALBUMIN SERPL BCP-MCNC: 3.9 G/DL (ref 3.5–5)
ALP SERPL-CCNC: 94 U/L (ref 46–116)
ALT SERPL W P-5'-P-CCNC: 38 U/L (ref 12–78)
ANION GAP SERPL CALCULATED.3IONS-SCNC: 7 MMOL/L (ref 4–13)
AST SERPL W P-5'-P-CCNC: 22 U/L (ref 5–45)
ATRIAL RATE: 55 BPM
BASOPHILS # BLD AUTO: 0.03 THOUSANDS/ΜL (ref 0–0.1)
BASOPHILS NFR BLD AUTO: 1 % (ref 0–1)
BILIRUB SERPL-MCNC: 1.18 MG/DL (ref 0.2–1)
BUN SERPL-MCNC: 12 MG/DL (ref 5–25)
CALCIUM SERPL-MCNC: 8.9 MG/DL (ref 8.3–10.1)
CHLORIDE SERPL-SCNC: 102 MMOL/L (ref 100–108)
CO2 SERPL-SCNC: 28 MMOL/L (ref 21–32)
CREAT SERPL-MCNC: 0.8 MG/DL (ref 0.6–1.3)
EOSINOPHIL # BLD AUTO: 0.28 THOUSAND/ΜL (ref 0–0.61)
EOSINOPHIL NFR BLD AUTO: 7 % (ref 0–6)
ERYTHROCYTE [DISTWIDTH] IN BLOOD BY AUTOMATED COUNT: 13.3 % (ref 11.6–15.1)
GLUCOSE SERPL-MCNC: 101 MG/DL (ref 65–140)
HCT VFR BLD AUTO: 42.3 % (ref 36.5–46.1)
HGB BLD-MCNC: 14.1 G/DL (ref 12–15.4)
IMM GRANULOCYTES # BLD AUTO: 0.01 THOUSAND/UL (ref 0–0.2)
IMM GRANULOCYTES NFR BLD AUTO: 0 % (ref 0–2)
LYMPHOCYTES # BLD AUTO: 1.39 THOUSANDS/ΜL (ref 0.6–4.47)
LYMPHOCYTES NFR BLD AUTO: 32 % (ref 14–44)
MCH RBC QN AUTO: 32.4 PG (ref 26.8–34.3)
MCHC RBC AUTO-ENTMCNC: 33.3 G/DL (ref 31.4–37.4)
MCV RBC AUTO: 97 FL (ref 82–98)
MONOCYTES # BLD AUTO: 0.49 THOUSAND/ΜL (ref 0.17–1.22)
MONOCYTES NFR BLD AUTO: 11 % (ref 4–12)
NEUTROPHILS # BLD AUTO: 2.14 THOUSANDS/ΜL (ref 1.85–7.62)
NEUTS SEG NFR BLD AUTO: 49 % (ref 43–75)
NRBC BLD AUTO-RTO: 0 /100 WBCS
NT-PROBNP SERPL-MCNC: 31 PG/ML
P AXIS: 63 DEGREES
PLATELET # BLD AUTO: 197 THOUSANDS/UL (ref 149–390)
PMV BLD AUTO: 9.6 FL (ref 8.9–12.7)
POTASSIUM SERPL-SCNC: 3.9 MMOL/L (ref 3.5–5.3)
PR INTERVAL: 218 MS
PROT SERPL-MCNC: 7 G/DL (ref 6.4–8.2)
QRS AXIS: 83 DEGREES
QRSD INTERVAL: 108 MS
QT INTERVAL: 448 MS
QTC INTERVAL: 428 MS
RBC # BLD AUTO: 4.35 MILLION/UL (ref 3.88–5.12)
SODIUM SERPL-SCNC: 137 MMOL/L (ref 136–145)
T WAVE AXIS: 45 DEGREES
VENTRICULAR RATE: 55 BPM
WBC # BLD AUTO: 4.34 THOUSAND/UL (ref 4.31–10.16)

## 2021-04-15 PROCEDURE — 80053 COMPREHEN METABOLIC PANEL: CPT | Performed by: EMERGENCY MEDICINE

## 2021-04-15 PROCEDURE — 93010 ELECTROCARDIOGRAM REPORT: CPT | Performed by: INTERNAL MEDICINE

## 2021-04-15 PROCEDURE — G0439 PPPS, SUBSEQ VISIT: HCPCS | Performed by: FAMILY MEDICINE

## 2021-04-15 PROCEDURE — 85025 COMPLETE CBC W/AUTO DIFF WBC: CPT | Performed by: EMERGENCY MEDICINE

## 2021-04-15 PROCEDURE — 93005 ELECTROCARDIOGRAM TRACING: CPT

## 2021-04-15 PROCEDURE — 99284 EMERGENCY DEPT VISIT MOD MDM: CPT

## 2021-04-15 PROCEDURE — 99285 EMERGENCY DEPT VISIT HI MDM: CPT | Performed by: EMERGENCY MEDICINE

## 2021-04-15 PROCEDURE — 93971 EXTREMITY STUDY: CPT | Performed by: SURGERY

## 2021-04-15 PROCEDURE — 36415 COLL VENOUS BLD VENIPUNCTURE: CPT | Performed by: EMERGENCY MEDICINE

## 2021-04-15 PROCEDURE — 83880 ASSAY OF NATRIURETIC PEPTIDE: CPT | Performed by: EMERGENCY MEDICINE

## 2021-04-15 PROCEDURE — 93971 EXTREMITY STUDY: CPT

## 2021-04-15 PROCEDURE — 99214 OFFICE O/P EST MOD 30 MIN: CPT | Performed by: FAMILY MEDICINE

## 2021-04-15 PROCEDURE — 1123F ACP DISCUSS/DSCN MKR DOCD: CPT | Performed by: FAMILY MEDICINE

## 2021-04-15 PROCEDURE — 71045 X-RAY EXAM CHEST 1 VIEW: CPT

## 2021-04-15 NOTE — PROGRESS NOTES
Assessment/Plan:    No problem-specific Assessment & Plan notes found for this encounter  As I was unable to arrange a stat duplex study r/o DVT any sooner than 14 days through central scheduling, I have suggested that he go to the ER for evaluation and possible duplex study in that setting  Advised that if there is no DVT then he may have an issue with lymphedema for which wearing compression stockings may help vs seeing a vascular surgeon for evaluation and possible lymphedema therapy     Diagnoses and all orders for this visit:    Medicare annual wellness visit, subsequent    Leg edema, left  -     VAS lower limb venous duplex study, unilateral/limited; Future    Benign prostatic hyperplasia without lower urinary tract symptoms    History of malignant melanoma of skin    Screening for diabetes mellitus (DM)  -     Comprehensive metabolic panel; Future    Screening for cardiovascular, respiratory, and genitourinary diseases  -     Lipid Panel with Direct LDL reflex; Future    Prostate cancer screening  -     PSA, Total Screen; Future        No follow-ups on file  Subjective:      Patient ID: Yara Owens is a 73 y o  adult  Chief Complaint   Patient presents with    Foot Swelling     wmcma    Medicare Wellness Visit       HPI  Left lower leg swelling  Thinks its related to back injuries, tends to occur when he in injured  On/off since Jan 2021  Can last few weeks at a time but now persistent last few weeks  Despite compression stockings    Back issues after injury  Sleeps sitting up for comfort  Chronic back pain per pt    No hx of DVT/PE in past  No fever  Hx of melanoma    The following portions of the patient's history were reviewed and updated as appropriate: allergies, current medications, past family history, past medical history, past social history, past surgical history and problem list     Review of Systems   Constitutional: Negative for fever     Respiratory: Negative for shortness of breath  Cardiovascular: Positive for leg swelling  Current Outpatient Medications   Medication Sig Dispense Refill    fluticasone (FLONASE) 50 mcg/act nasal spray 2 sprays into each nostril daily at bedtime (Patient taking differently: 2 sprays into each nostril daily as needed ) 3 Bottle 3    tamsulosin (Flomax) 0 4 mg Take 1 capsule (0 4 mg total) by mouth daily with dinner 90 capsule 1     No current facility-administered medications for this visit  Objective:    /80   Pulse 81   Temp 98 5 °F (36 9 °C)   Resp 16   Ht 6' 2" (1 88 m)   Wt 99 8 kg (220 lb)   SpO2 95%   BMI 28 25 kg/m²        Physical Exam  Vitals signs and nursing note reviewed  Constitutional:       General: He is not in acute distress  Appearance: He is well-developed  He is not ill-appearing  HENT:      Head: Normocephalic  Eyes:      General: No scleral icterus  Conjunctiva/sclera: Conjunctivae normal    Neck:      Musculoskeletal: Neck supple  Cardiovascular:      Rate and Rhythm: Normal rate and regular rhythm  Heart sounds: No murmur  Pulmonary:      Effort: Pulmonary effort is normal  No respiratory distress  Breath sounds: No wheezing or rales  Abdominal:      Palpations: Abdomen is soft  Musculoskeletal:         General: No deformity  Left lower leg: Edema present  Comments: Neg homans  No pallor or warmth or coolness   Skin:     General: Skin is warm and dry  Coloration: Skin is not pale  Neurological:      Mental Status: He is alert  Motor: No weakness  Psychiatric:         Mood and Affect: Mood normal          Behavior: Behavior normal          Thought Content:  Thought content normal          No inguinal adenopathy b/l       Vernavenkatesh Lopez, DO
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
## 2021-04-15 NOTE — PATIENT INSTRUCTIONS
Medicare Preventive Visit Patient Instructions  Thank you for completing your Welcome to Medicare Visit or Medicare Annual Wellness Visit today  Your next wellness visit will be due in one year (4/16/2022)  The screening/preventive services that you may require over the next 5-10 years are detailed below  Some tests may not apply to you based off risk factors and/or age  Screening tests ordered at today's visit but not completed yet may show as past due  Also, please note that scanned in results may not display below  Preventive Screenings:  Service Recommendations Previous Testing/Comments   Colorectal Cancer Screening  · Colonoscopy    · Fecal Occult Blood Test (FOBT)/Fecal Immunochemical Test (FIT)  · Fecal DNA/Cologuard Test  · Flexible Sigmoidoscopy Age: 54-65 years old   Colonoscopy: every 10 years (May be performed more frequently if at higher risk)  OR  FOBT/FIT: every 1 year  OR  Cologuard: every 3 years  OR  Sigmoidoscopy: every 5 years  Screening may be recommended earlier than age 48 if at higher risk for colorectal cancer  Also, an individualized decision between you and your healthcare provider will decide whether screening between the ages of 74-80 would be appropriate   Colonoscopy: 10/30/2006  FOBT/FIT: Not on file  Cologuard: Not on file  Sigmoidoscopy: Not on file    Risks and Benefits Discussed     Prostate Cancer Screening Individualized decision between patient and health care provider in men between ages of 53-78   Medicare will cover every 12 months beginning on the day after your 50th birthday PSA: 3 1 ng/mL     Risks and Benefits Discussed     Hepatitis C Screening Once for adults born between 1945 and 1965  More frequently in patients at high risk for Hepatitis C Hep C Antibody: Not on file        Diabetes Screening 1-2 times per year if you're at risk for diabetes or have pre-diabetes Fasting glucose: No results in last 5 years   A1C: No results in last 5 years    Screening Current Cholesterol Screening Once every 5 years if you don't have a lipid disorder  May order more often based on risk factors  Lipid panel: 09/06/2019    Screening Current      Other Preventive Screenings Covered by Medicare:  1  Abdominal Aortic Aneurysm (AAA) Screening: covered once if your at risk  You're considered to be at risk if you have a family history of AAA or a male between the age of 73-68 who smoking at least 100 cigarettes in your lifetime  2  Lung Cancer Screening: covers low dose CT scan once per year if you meet all of the following conditions: (1) Age 50-69; (2) No signs or symptoms of lung cancer; (3) Current smoker or have quit smoking within the last 15 years; (4) You have a tobacco smoking history of at least 30 pack years (packs per day x number of years you smoked); (5) You get a written order from a healthcare provider  3  Glaucoma Screening: covered annually if you're considered high risk: (1) You have diabetes OR (2) Family history of glaucoma OR (3)  aged 48 and older OR (3)  American aged 72 and older  3  Osteoporosis Screening: covered every 2 years if you meet one of the following conditions: (1) Have a vertebral abnormality; (2) On glucocorticoid therapy for more than 3 months; (3) Have primary hyperparathyroidism; (4) On osteoporosis medications and need to assess response to drug therapy  5  HIV Screening: covered annually if you're between the age of 12-76  Also covered annually if you are younger than 13 and older than 72 with risk factors for HIV infection  For pregnant patients, it is covered up to 3 times per pregnancy      Immunizations:  Immunization Recommendations   Influenza Vaccine Annual influenza vaccination during flu season is recommended for all persons aged >= 6 months who do not have contraindications   Pneumococcal Vaccine (Prevnar and Pneumovax)  * Prevnar = PCV13  * Pneumovax = PPSV23 Adults 25-60 years old: 1-3 doses may be recommended based on certain risk factors  Adults 72 years old: Prevnar (PCV13) vaccine recommended followed by Pneumovax (PPSV23) vaccine  If already received PPSV23 since turning 65, then PCV13 recommended at least one year after PPSV23 dose  Hepatitis B Vaccine 3 dose series if at intermediate or high risk (ex: diabetes, end stage renal disease, liver disease)   Tetanus (Td) Vaccine - COST NOT COVERED BY MEDICARE PART B Following completion of primary series, a booster dose should be given every 10 years to maintain immunity against tetanus  Td may also be given as tetanus wound prophylaxis  Tdap Vaccine - COST NOT COVERED BY MEDICARE PART B Recommended at least once for all adults  For pregnant patients, recommended with each pregnancy  Shingles Vaccine (Shingrix) - COST NOT COVERED BY MEDICARE PART B  2 shot series recommended in those aged 48 and above     Health Maintenance Due:      Topic Date Due    Hepatitis C Screening  Never done    Colorectal Cancer Screening  10/30/2016     Immunizations Due:      Topic Date Due    COVID-19 Vaccine (1) Never done    Pneumococcal Vaccine: 65+ Years (1 of 1 - PPSV23) Never done     Advance Directives   What are advance directives? Advance directives are legal documents that state your wishes and plans for medical care  These plans are made ahead of time in case you lose your ability to make decisions for yourself  Advance directives can apply to any medical decision, such as the treatments you want, and if you want to donate organs  What are the types of advance directives? There are many types of advance directives, and each state has rules about how to use them  You may choose a combination of any of the following:  · Living will: This is a written record of the treatment you want  You can also choose which treatments you do not want, which to limit, and which to stop at a certain time  This includes surgery, medicine, IV fluid, and tube feedings     · Durable power of  for [facility]): This is a written record that states who you want to make healthcare choices for you when you are unable to make them for yourself  This person, called a proxy, is usually a family member or a friend  You may choose more than 1 proxy  · Do not resuscitate (DNR) order:  A DNR order is used in case your heart stops beating or you stop breathing  It is a request not to have certain forms of treatment, such as CPR  A DNR order may be included in other types of advance directives  · Medical directive: This covers the care that you want if you are in a coma, near death, or unable to make decisions for yourself  You can list the treatments you want for each condition  Treatment may include pain medicine, surgery, blood transfusions, dialysis, IV or tube feedings, and a ventilator (breathing machine)  · Values history: This document has questions about your views, beliefs, and how you feel and think about life  This information can help others choose the care that you would choose  Why are advance directives important? An advance directive helps you control your care  Although spoken wishes may be used, it is better to have your wishes written down  Spoken wishes can be misunderstood, or not followed  Treatments may be given even if you do not want them  An advance directive may make it easier for your family to make difficult choices about your care  Weight Management   Why it is important to manage your weight:  Being overweight increases your risk of health conditions such as heart disease, high blood pressure, type 2 diabetes, and certain types of cancer  It can also increase your risk for osteoarthritis, sleep apnea, and other respiratory problems  Aim for a slow, steady weight loss  Even a small amount of weight loss can lower your risk of health problems  How to lose weight safely:  A safe and healthy way to lose weight is to eat fewer calories and get regular exercise  You can lose up about 1 pound a week by decreasing the number of calories you eat by 500 calories each day  Healthy meal plan for weight management:  A healthy meal plan includes a variety of foods, contains fewer calories, and helps you stay healthy  A healthy meal plan includes the following:  · Eat whole-grain foods more often  A healthy meal plan should contain fiber  Fiber is the part of grains, fruits, and vegetables that is not broken down by your body  Whole-grain foods are healthy and provide extra fiber in your diet  Some examples of whole-grain foods are whole-wheat breads and pastas, oatmeal, brown rice, and bulgur  · Eat a variety of vegetables every day  Include dark, leafy greens such as spinach, kale, magalis greens, and mustard greens  Eat yellow and orange vegetables such as carrots, sweet potatoes, and winter squash  · Eat a variety of fruits every day  Choose fresh or canned fruit (canned in its own juice or light syrup) instead of juice  Fruit juice has very little or no fiber  · Eat low-fat dairy foods  Drink fat-free (skim) milk or 1% milk  Eat fat-free yogurt and low-fat cottage cheese  Try low-fat cheeses such as mozzarella and other reduced-fat cheeses  · Choose meat and other protein foods that are low in fat  Choose beans or other legumes such as split peas or lentils  Choose fish, skinless poultry (chicken or turkey), or lean cuts of red meat (beef or pork)  Before you cook meat or poultry, cut off any visible fat  · Use less fat and oil  Try baking foods instead of frying them  Add less fat, such as margarine, sour cream, regular salad dressing and mayonnaise to foods  Eat fewer high-fat foods  Some examples of high-fat foods include french fries, doughnuts, ice cream, and cakes  · Eat fewer sweets  Limit foods and drinks that are high in sugar  This includes candy, cookies, regular soda, and sweetened drinks    Exercise:  Exercise at least 30 minutes per day on most days of the week  Some examples of exercise include walking, biking, dancing, and swimming  You can also fit in more physical activity by taking the stairs instead of the elevator or parking farther away from stores  Ask your healthcare provider about the best exercise plan for you  © Copyright [employer] 2018 Information is for End User's use only and may not be sold, redistributed or otherwise used for commercial purposes  All illustrations and images included in CareNotes® are the copyrighted property of [employer]  or [facility] Preventive Visit Patient Instructions  Thank you for completing your Welcome to Medicare Visit or Medicare Annual Wellness Visit today  Your next wellness visit will be due in one year (4/16/2022)  The screening/preventive services that you may require over the next 5-10 years are detailed below  Some tests may not apply to you based off risk factors and/or age  Screening tests ordered at today's visit but not completed yet may show as past due  Also, please note that scanned in results may not display below  Preventive Screenings:  Service Recommendations Previous Testing/Comments   Colorectal Cancer Screening  · Colonoscopy    · Fecal Occult Blood Test (FOBT)/Fecal Immunochemical Test (FIT)  · Fecal DNA/Cologuard Test  · Flexible Sigmoidoscopy Age: 54-65 years old   Colonoscopy: every 10 years (May be performed more frequently if at higher risk)  OR  FOBT/FIT: every 1 year  OR  Cologuard: every 3 years  OR  Sigmoidoscopy: every 5 years  Screening may be recommended earlier than age 48 if at higher risk for colorectal cancer  Also, an individualized decision between you and your healthcare provider will decide whether screening between the ages of 74-80 would be appropriate   Colonoscopy: 10/30/2006  FOBT/FIT: Not on file  Cologuard: Not on file  Sigmoidoscopy: Not on file    Risks and Benefits Discussed     Prostate Cancer Screening Individualized decision between patient and health care provider in men between ages of 53-78   Medicare will cover every 12 months beginning on the day after your 50th birthday PSA: 3 1 ng/mL     Risks and Benefits Discussed     Hepatitis C Screening Once for adults born between 1945 and 1965  More frequently in patients at high risk for Hepatitis C Hep C Antibody: Not on file        Diabetes Screening 1-2 times per year if you're at risk for diabetes or have pre-diabetes Fasting glucose: No results in last 5 years   A1C: No results in last 5 years    Screening Current   Cholesterol Screening Once every 5 years if you don't have a lipid disorder  May order more often based on risk factors  Lipid panel: 09/06/2019    Screening Current      Other Preventive Screenings Covered by Medicare:  6  Abdominal Aortic Aneurysm (AAA) Screening: covered once if your at risk  You're considered to be at risk if you have a family history of AAA or a male between the age of 73-68 who smoking at least 100 cigarettes in your lifetime  7  Lung Cancer Screening: covers low dose CT scan once per year if you meet all of the following conditions: (1) Age 50-69; (2) No signs or symptoms of lung cancer; (3) Current smoker or have quit smoking within the last 15 years; (4) You have a tobacco smoking history of at least 30 pack years (packs per day x number of years you smoked); (5) You get a written order from a healthcare provider  8  Glaucoma Screening: covered annually if you're considered high risk: (1) You have diabetes OR (2) Family history of glaucoma OR (3)  aged 48 and older OR (3)  American aged 72 and older  5  Osteoporosis Screening: covered every 2 years if you meet one of the following conditions: (1) Have a vertebral abnormality; (2) On glucocorticoid therapy for more than 3 months; (3) Have primary hyperparathyroidism; (4) On osteoporosis medications and need to assess response to drug therapy    10  HIV Screening: covered annually if you're between the age of 15-65  Also covered annually if you are younger than 13 and older than 72 with risk factors for HIV infection  For pregnant patients, it is covered up to 3 times per pregnancy  Immunizations:  Immunization Recommendations   Influenza Vaccine Annual influenza vaccination during flu season is recommended for all persons aged >= 6 months who do not have contraindications   Pneumococcal Vaccine (Prevnar and Pneumovax)  * Prevnar = PCV13  * Pneumovax = PPSV23 Adults 25-60 years old: 1-3 doses may be recommended based on certain risk factors  Adults 72 years old: Prevnar (PCV13) vaccine recommended followed by Pneumovax (PPSV23) vaccine  If already received PPSV23 since turning 65, then PCV13 recommended at least one year after PPSV23 dose  Hepatitis B Vaccine 3 dose series if at intermediate or high risk (ex: diabetes, end stage renal disease, liver disease)   Tetanus (Td) Vaccine - COST NOT COVERED BY MEDICARE PART B Following completion of primary series, a booster dose should be given every 10 years to maintain immunity against tetanus  Td may also be given as tetanus wound prophylaxis  Tdap Vaccine - COST NOT COVERED BY MEDICARE PART B Recommended at least once for all adults  For pregnant patients, recommended with each pregnancy  Shingles Vaccine (Shingrix) - COST NOT COVERED BY MEDICARE PART B  2 shot series recommended in those aged 48 and above     Health Maintenance Due:      Topic Date Due    Hepatitis C Screening  Never done    Colorectal Cancer Screening  10/30/2016     Immunizations Due:      Topic Date Due    COVID-19 Vaccine (1) Never done    Pneumococcal Vaccine: 65+ Years (1 of 1 - PPSV23) Never done     Advance Directives   What are advance directives? Advance directives are legal documents that state your wishes and plans for medical care  These plans are made ahead of time in case you lose your ability to make decisions for yourself  Advance directives can apply to any medical decision, such as the treatments you want, and if you want to donate organs  What are the types of advance directives? There are many types of advance directives, and each state has rules about how to use them  You may choose a combination of any of the following:  · Living will: This is a written record of the treatment you want  You can also choose which treatments you do not want, which to limit, and which to stop at a certain time  This includes surgery, medicine, IV fluid, and tube feedings  · Durable power of  for healthcare [facility]): This is a written record that states who you want to make healthcare choices for you when you are unable to make them for yourself  This person, called a proxy, is usually a family member or a friend  You may choose more than 1 proxy  · Do not resuscitate (DNR) order:  A DNR order is used in case your heart stops beating or you stop breathing  It is a request not to have certain forms of treatment, such as CPR  A DNR order may be included in other types of advance directives  · Medical directive: This covers the care that you want if you are in a coma, near death, or unable to make decisions for yourself  You can list the treatments you want for each condition  Treatment may include pain medicine, surgery, blood transfusions, dialysis, IV or tube feedings, and a ventilator (breathing machine)  · Values history: This document has questions about your views, beliefs, and how you feel and think about life  This information can help others choose the care that you would choose  Why are advance directives important? An advance directive helps you control your care  Although spoken wishes may be used, it is better to have your wishes written down  Spoken wishes can be misunderstood, or not followed  Treatments may be given even if you do not want them   An advance directive may make it easier for your family to make difficult choices about your care  Weight Management   Why it is important to manage your weight:  Being overweight increases your risk of health conditions such as heart disease, high blood pressure, type 2 diabetes, and certain types of cancer  It can also increase your risk for osteoarthritis, sleep apnea, and other respiratory problems  Aim for a slow, steady weight loss  Even a small amount of weight loss can lower your risk of health problems  How to lose weight safely:  A safe and healthy way to lose weight is to eat fewer calories and get regular exercise  You can lose up about 1 pound a week by decreasing the number of calories you eat by 500 calories each day  Healthy meal plan for weight management:  A healthy meal plan includes a variety of foods, contains fewer calories, and helps you stay healthy  A healthy meal plan includes the following:  · Eat whole-grain foods more often  A healthy meal plan should contain fiber  Fiber is the part of grains, fruits, and vegetables that is not broken down by your body  Whole-grain foods are healthy and provide extra fiber in your diet  Some examples of whole-grain foods are whole-wheat breads and pastas, oatmeal, brown rice, and bulgur  · Eat a variety of vegetables every day  Include dark, leafy greens such as spinach, kale, magalis greens, and mustard greens  Eat yellow and orange vegetables such as carrots, sweet potatoes, and winter squash  · Eat a variety of fruits every day  Choose fresh or canned fruit (canned in its own juice or light syrup) instead of juice  Fruit juice has very little or no fiber  · Eat low-fat dairy foods  Drink fat-free (skim) milk or 1% milk  Eat fat-free yogurt and low-fat cottage cheese  Try low-fat cheeses such as mozzarella and other reduced-fat cheeses  · Choose meat and other protein foods that are low in fat  Choose beans or other legumes such as split peas or lentils   Choose fish, skinless poultry (chicken or turkey), or lean cuts of red meat (beef or pork)  Before you cook meat or poultry, cut off any visible fat  · Use less fat and oil  Try baking foods instead of frying them  Add less fat, such as margarine, sour cream, regular salad dressing and mayonnaise to foods  Eat fewer high-fat foods  Some examples of high-fat foods include french fries, doughnuts, ice cream, and cakes  · Eat fewer sweets  Limit foods and drinks that are high in sugar  This includes candy, cookies, regular soda, and sweetened drinks  Exercise:  Exercise at least 30 minutes per day on most days of the week  Some examples of exercise include walking, biking, dancing, and swimming  You can also fit in more physical activity by taking the stairs instead of the elevator or parking farther away from stores  Ask your healthcare provider about the best exercise plan for you  © Copyright [employer] 2018 Information is for End User's use only and may not be sold, redistributed or otherwise used for commercial purposes   All illustrations and images included in CareNotes® are the copyrighted property of [employer]  or [address] [place] [address]

## 2021-04-15 NOTE — PROGRESS NOTES
Assessment and Plan:     Problem List Items Addressed This Visit        Active Problems    Screening for cardiovascular, respiratory, and genitourinary diseases    Relevant Orders    Lipid Panel with Direct LDL reflex    Screening for diabetes mellitus (DM)    Relevant Orders    Comprehensive metabolic panel    Prostate cancer screening    Relevant Orders    PSA, Total Screen    History of malignant melanoma of skin    Benign prostatic hyperplasia without lower urinary tract symptoms    Leg edema, left    Relevant Orders    VAS lower limb venous duplex study, unilateral/limited    Medicare annual wellness visit, subsequent - Primary           Preventive health issues were discussed with patient, and age appropriate screening tests were ordered as noted in patient's After Visit Summary  Personalized health advice and appropriate referrals for health education or preventive services given if needed, as noted in patient's After Visit Summary  History of Present Illness:     Patient presents for Medicare Annual Wellness visit    Patient Care Team:  Gabriella Watts, DO as PCP - Silvano Torres MD     Problem List:     Patient Active Problem List   Diagnosis    Allergic rhinitis    Benign colon polyp    Colon, diverticulosis    Benign prostatic hyperplasia without lower urinary tract symptoms    Low HDL (under 40)    History of malignant melanoma of skin    Renal colic    Nerve entrapment syndrome, inguinal, right    Screening for cardiovascular, respiratory, and genitourinary diseases    Screening for diabetes mellitus (DM)    Prostate cancer screening    Colon cancer screening    S/P TURP    Pain in both feet    Rib pain    Leg edema, left    Medicare annual wellness visit, subsequent      Past Medical and Surgical History:     Past Medical History:   Diagnosis Date    Cancer ([facility] [address] ) 2011    melanoma of skin of left shoulder   basal  cell of face7/2/20    Colon polyp     Dental abscess     started  4 weeks ago  on RX    Enlarged prostate     Urinary bladder disorder      Past Surgical History:   Procedure Laterality Date    CATARACT EXTRACTION      COLONOSCOPY      HERNIA REPAIR      MASS EXCISION      melanoma of left shoulder     MO REMV RESID OBSTRUC PROSTATE,>1 YR N/A 2020    Procedure: CYSTOSCOPY, TRANSURETHRAL RESECTION OF RESIDUAL OR REGROWTH OF OBSTRUCTIVE PROSTATE TISSUE;  Surgeon: Kendra Simpson MD;  Location: [address] [place] [address];  Service: Urology    MO TRANSURETHRAL ELEC-SURG PROSTATECTOM N/A 10/8/2019    Procedure: CYSTOSCOPY, TRANSURETHRAL RESECTION OF PROSTATE (TURP); Surgeon: Kendra Simpson MD;  Location: [address] [place] [address];  Service: Urology    MO XCAPSL CTRC RMVL INSJ IO LENS PROSTH W/O ECP Right 6/15/2020    Procedure: EXTRACTION EXTRACAPSULAR CATARACT PHACO INTRAOCULAR LENS (IOL);   Surgeon: Cooper Hamlin MD;  Location: [facility] MAIN OR;  Service: Ophthalmology    TONSILLECTOMY        Family History:     Family History   Problem Relation Age of Onset    Diabetes Mother     Heart failure Father     Hypertension Father     No Known Problems Sister     Diabetes Brother       Social History:     E-Cigarette/Vaping    E-Cigarette Use Never User      E-Cigarette/Vaping Substances    Nicotine No     THC No     CBD No     Flavoring No     Other No     Unknown No      Social History     Socioeconomic History    Marital status: Unknown     Spouse name: None    Number of children: None    Years of education: None    Highest education level: None   Occupational History    None   Social Needs    Financial resource strain: None    Food insecurity     Worry: None     Inability: None    Transportation needs     Medical: None     Non-medical: None   Tobacco Use    Smoking status: Former Smoker     Packs/day: 1 00     Types: Cigarettes     Quit date: 2016     Years since quittin 7    Smokeless tobacco: Never Used   Substance and Sexual Activity    Alcohol use: Never     Frequency: Never    Drug use: Never    Sexual activity: Not Currently   Lifestyle    Physical activity     Days per week: None     Minutes per session: None    Stress: None   Relationships    Social connections     Talks on phone: None     Gets together: None     Attends [employer] service: None     Active member of club or organization: None     Attends meetings of clubs or organizations: None     Relationship status: None    Intimate partner violence     Fear of current or ex partner: None     Emotionally abused: None     Physically abused: None     Forced sexual activity: None   Other Topics Concern    None   Social History Narrative    None      Medications and Allergies:     Current Outpatient Medications   Medication Sig Dispense Refill    fluticasone (FLONASE) 50 mcg/act nasal spray 2 sprays into each nostril daily at bedtime (Patient taking differently: 2 sprays into each nostril daily as needed ) 3 Bottle 3    tamsulosin (Flomax) 0 4 mg Take 1 capsule (0 4 mg total) by mouth daily with dinner 90 capsule 1     No current facility-administered medications for this visit  Allergies   Allergen Reactions    Aspirin GI Intolerance     Other reaction(s): GI upset  Reaction Date: [id number]; Other reaction(s): gi complications  Reaction Date: 16Mar2006;       Immunizations:     Immunization History   Administered Date(s) Administered    INFLUENZA 10/01/2003    Tdap 09/27/2010      Health Maintenance:         Topic Date Due    Hepatitis C Screening  Never done    Colorectal Cancer Screening  10/30/2016         Topic Date Due    COVID-19 Vaccine (1) Never done    Pneumococcal Vaccine: 65+ Years (1 of 1 - PPSV23) Never done      Medicare Health Risk Assessment:     /80   Pulse 81   Temp 98 5 °F (36 9 °C)   Resp 16   Ht 6' 2" (1 88 m)   Wt 99 8 kg (220 lb)   SpO2 95%   BMI 28 25 kg/m²      Edwards Leak is here for his Subsequent Wellness visit   Last Medicare Wellness visit information reviewed, patient interviewed and updates made to the record today  Health Risk Assessment:   Patient rates overall health as excellent  Patient feels that their physical health rating is same  Patient is very satisfied with their life  Eyesight was rated as same  Hearing was rated as same  Patient feels that their emotional and mental health rating is same  Patients states they are never, rarely angry  Patient states they are never, rarely unusually tired/fatigued  Pain experienced in the last 7 days has been some  Patient's pain rating has been 3/10  Patient states that he has experienced no weight loss or gain in last 6 months  Depression Screening:   PHQ-2 Score: 0      Fall Risk Screening: In the past year, patient has experienced: no history of falling in past year      Home Safety:  Patient does not have trouble with stairs inside or outside of their home  Patient has working smoke alarms and has no working carbon monoxide detector  Home safety hazards include: none  Nutrition:   Current diet is Regular  Medications:   Patient is currently taking over-the-counter supplements  OTC medications include: see medication list  Patient is able to manage medications  Activities of Daily Living (ADLs)/Instrumental Activities of Daily Living (IADLs):   Walk and transfer into and out of bed and chair?: Yes  Dress and groom yourself?: Yes    Bathe or shower yourself?: Yes    Feed yourself?  Yes  Do your laundry/housekeeping?: Yes  Manage your money, pay your bills and track your expenses?: Yes  Make your own meals?: Yes    Do your own shopping?: Yes    Previous Hospitalizations:   Any hospitalizations or ED visits within the last 12 months?: Yes    How many hospitalizations have you had in the last year?: 1-2    Advance Care Planning:   Living will: No    Durable POA for healthcare: No    Advanced directive: Yes      Cognitive Screening:   Provider or family/friend/caregiver concerned regarding cognition?: No    PREVENTIVE SCREENINGS      Cardiovascular Screening:    General: Screening Current      Diabetes Screening:     General: Screening Current      Colorectal Cancer Screening:     General: Risks and Benefits Discussed      Prostate Cancer Screening:    General: Risks and Benefits Discussed      Osteoporosis Screening:    General: Screening Not Indicated      Abdominal Aortic Aneurysm (AAA) Screening:    Risk factors include: age between 73-69 yo and tobacco use        Lung Cancer Screening:     General: Screening Not Indicated      Hepatitis C Screening:      Hep C Screening Accepted: No     Screening, Brief Intervention, and Referral to Treatment (SBIRT)    Screening  Typical number of drinks in a day: 0  Typical number of drinks in a week: 0  Interpretation: Low risk drinking behavior  Single Item Drug Screening:  How often have you used an illegal drug (including marijuana) or a prescription medication for non-medical reasons in the past year? never    Single Item Drug Screen Score: 0  Interpretation: Negative screen for possible drug use disorder    Other Counseling Topics:   Car/seat belt/driving safety and regular weightbearing exercise         Sarina Gaytan, DO

## 2021-04-19 ENCOUNTER — HOSPITAL ENCOUNTER (EMERGENCY)
Facility: HOSPITAL | Age: 71
Discharge: HOME | End: 2021-04-19
Attending: EMERGENCY MEDICINE | Admitting: EMERGENCY MEDICINE
Payer: MEDICARE

## 2021-04-19 VITALS
HEART RATE: 68 BPM | DIASTOLIC BLOOD PRESSURE: 92 MMHG | WEIGHT: 220.2 LBS | OXYGEN SATURATION: 95 % | BODY MASS INDEX: 28.27 KG/M2 | RESPIRATION RATE: 20 BRPM | TEMPERATURE: 98.1 F | SYSTOLIC BLOOD PRESSURE: 135 MMHG

## 2021-04-19 DIAGNOSIS — L23.1 ALLERGIC CONTACT DERMATITIS DUE TO ADHESIVES: Primary | ICD-10-CM

## 2021-04-19 PROCEDURE — 99282 EMERGENCY DEPT VISIT SF MDM: CPT

## 2021-04-19 PROCEDURE — 99282 EMERGENCY DEPT VISIT SF MDM: CPT | Performed by: PHYSICIAN ASSISTANT

## 2021-04-21 ENCOUNTER — VBI (OUTPATIENT)
Dept: FAMILY MEDICINE CLINIC | Facility: CLINIC | Age: 71
End: 2021-04-21

## 2021-04-21 NOTE — TELEPHONE ENCOUNTER
Fleet Len    ED Visit Information     Ed visit date: 04/19/2021  Diagnosis Description: Allergic contact dermatitis due to adhesives  In Network? Yes [facility]  Discharge status: Home  Discharged with meds ? No  Number of ED visits to date: 2  ED Severity:NA     Outreach Information    Outreach successful: Yes 1  Date letter mailed:NA  Date Finalized:04/21/2021    Care Coordination    Follow up appointment with pcp: no no  Transportation issues ? NA    [employer] Outreach    S/W patient who states he's good, it's getting better    Patient aware if he has any more problems with this issue he can call [facility]

## 2021-04-27 ENCOUNTER — OFFICE VISIT (OUTPATIENT)
Dept: GASTROENTEROLOGY | Facility: CLINIC | Age: 71
End: 2021-04-27
Payer: MEDICARE

## 2021-04-27 VITALS
DIASTOLIC BLOOD PRESSURE: 87 MMHG | BODY MASS INDEX: 28.23 KG/M2 | SYSTOLIC BLOOD PRESSURE: 143 MMHG | HEART RATE: 69 BPM | HEIGHT: 74 IN | WEIGHT: 220 LBS

## 2021-04-27 DIAGNOSIS — K63.5 BENIGN COLON POLYP: Primary | ICD-10-CM

## 2021-04-27 PROCEDURE — 99203 OFFICE O/P NEW LOW 30 MIN: CPT | Performed by: INTERNAL MEDICINE

## 2021-04-27 NOTE — PROGRESS NOTES
[facility] - Outpatient Consultation  Gale Drake 73 y o  male MRN: [id number]  Encounter: [id number]        ASSESSMENT AND PLAN:      1  Benign colon polyp   overdue for surveillance colonoscopy  This will be arranged at his convenience  - Colonoscopy; Future  - PAT Covid Screening; Future    ______________________________________________________________________    HPI:   Patient has history of colon polyp on  Prior colonoscopy  He thought that  His colonoscopy had been performed in 2016, but he has only had single colonoscopy and there is documentation actually performed in 2006  I do not have records of the actual procedure itself, however  He has had no symptoms, no family history of gastrointestinal disease      REVIEW OF SYSTEMS:    Review of Systems   All other systems reviewed and are negative  Historical Information   Past Medical History:   Diagnosis Date    Cancer [facility]) 2011    melanoma of skin of left shoulder  basal  cell of face7/2/20    Colon polyp     Dental abscess     started  4 weeks ago  on RX    Enlarged prostate     Urinary bladder disorder      Past Surgical History:   Procedure Laterality Date    CATARACT EXTRACTION      COLONOSCOPY      HERNIA REPAIR      MASS EXCISION      melanoma of left shoulder     MN REMV RESID OBSTRUC PROSTATE,>1 YR N/A 7/14/2020    Procedure: CYSTOSCOPY, TRANSURETHRAL RESECTION OF RESIDUAL OR REGROWTH OF OBSTRUCTIVE PROSTATE TISSUE;  Surgeon: Jacobo Al MD;  Location: [address] [place] [address];  Service: Urology    MN TRANSURETHRAL ELEC-SURG PROSTATECTOM N/A 10/8/2019    Procedure: CYSTOSCOPY, TRANSURETHRAL RESECTION OF PROSTATE (TURP); Surgeon: Jacobo Al MD;  Location: [address] [place] [address];  Service: Urology    MN XCAPSL CTRC RMVL INSJ IO LENS PROSTH W/O ECP Right 6/15/2020    Procedure: EXTRACTION EXTRACAPSULAR CATARACT PHACO INTRAOCULAR LENS (IOL);   Surgeon: Jennifer Abreu MD;  Location: [facility] MAIN OR;  Service: Ophthalmology    TONSILLECTOMY       Social History   Social History     Substance and Sexual Activity   Alcohol Use Never    Frequency: Never     Social History     Substance and Sexual Activity   Drug Use Never     Social History     Tobacco Use   Smoking Status Former Smoker    Packs/day: 1 00    Types: Cigarettes    Quit date: 2016    Years since quittin 7   Smokeless Tobacco Never Used     Family History   Problem Relation Age of Onset    Diabetes Mother     Heart failure Father     Hypertension Father     No Known Problems Sister     Diabetes Brother        Meds/Allergies       Current Outpatient Medications:     fluticasone (FLONASE) 50 mcg/act nasal spray    tamsulosin (Flomax) 0 4 mg    Allergies   Allergen Reactions    Aspirin GI Intolerance     Other reaction(s): GI upset  Reaction Date: ; Other reaction(s): gi complications  Reaction Date: 2006;            Objective     Blood pressure 143/87, pulse 69, height 6' 2" (1 88 m), weight 99 8 kg (220 lb)  Body mass index is 28 25 kg/m²  PHYSICAL EXAM:      Physical Exam  Vitals signs and nursing note reviewed  Constitutional:       General: He is not in acute distress  HENT:      Head: Normocephalic and atraumatic  Mouth/Throat:      Mouth: Mucous membranes are moist    Eyes:      General: No scleral icterus  Pupils: Pupils are equal, round, and reactive to light  Neck:      Musculoskeletal: Normal range of motion and neck supple  Cardiovascular:      Rate and Rhythm: Normal rate and regular rhythm  Pulmonary:      Effort: Pulmonary effort is normal  No respiratory distress  Breath sounds: Normal breath sounds  Abdominal:      General: There is no distension  Palpations: Abdomen is soft  Musculoskeletal: Normal range of motion  Skin:     General: Skin is warm and dry  Neurological:      General: No focal deficit present        Mental Status: He is alert and oriented to person, place, and time  Psychiatric:         Mood and Affect: Mood normal          Behavior: Behavior normal               Lab Results:   No visits with results within 1 Day(s) from this visit  Latest known visit with results is:   Admission on 04/15/2021, Discharged on 04/15/2021   Component Date Value    WBC 04/15/2021 4 34     RBC 04/15/2021 4 35     Hemoglobin 04/15/2021 14 1     Hematocrit 04/15/2021 42 3     MCV 04/15/2021 97     MCH 04/15/2021 32 4     MCHC 04/15/2021 33 3     RDW 04/15/2021 13 3     MPV 04/15/2021 9 6     Platelets 01/12/8888 197     nRBC 04/15/2021 0     Neutrophils Relative 04/15/2021 49     Immat GRANS % 04/15/2021 0     Lymphocytes Relative 04/15/2021 32     Monocytes Relative 04/15/2021 11     Eosinophils Relative 04/15/2021 7*    Basophils Relative 04/15/2021 1     Neutrophils Absolute 04/15/2021 2 14     Immature Grans Absolute 04/15/2021 0 01     Lymphocytes Absolute 04/15/2021 1 39     Monocytes Absolute 04/15/2021 0 49     Eosinophils Absolute 04/15/2021 0 28     Basophils Absolute 04/15/2021 0 03     Sodium 04/15/2021 137     Potassium 04/15/2021 3 9     Chloride 04/15/2021 102     CO2 04/15/2021 28     ANION GAP 04/15/2021 7     BUN 04/15/2021 12     Creatinine 04/15/2021 0 80     Glucose 04/15/2021 101     Calcium 04/15/2021 8 9     AST 04/15/2021 22     ALT 04/15/2021 38     Alkaline Phosphatase 04/15/2021 94     Total Protein 04/15/2021 7 0     Albumin 04/15/2021 3 9     Total Bilirubin 04/15/2021 1 18*    NT-proBNP 04/15/2021 31     Ventricular Rate 04/15/2021 55     Atrial Rate 04/15/2021 55     PA Interval 04/15/2021 218     QRSD Interval 04/15/2021 108     QT Interval 04/15/2021 448     QTC Interval 04/15/2021 428     P Axis 04/15/2021 63     QRS Axis 04/15/2021 83     T Wave Axis 04/15/2021 45          Radiology Results:   Xr Chest 1 View Portable    Result Date: 4/15/2021  Narrative: CHEST INDICATION:   BL LE edema  COMPARISON:  Chest 10/20/2020 EXAM PERFORMED/VIEWS:  XR CHEST PORTABLE FINDINGS: Cardiomediastinal silhouette appears unremarkable  There is biapical scarring  The lungs are otherwise clear  No pneumothorax or pleural effusion  Osseous structures appear within normal limits for patient age  Impression: No acute cardiopulmonary disease  Workstation performed: [id number]     Vas Lower Limb Venous Duplex Study, Unilateral/limited    Result Date: 4/15/2021  Narrative:  [facility] REPORT CLINICAL: Indications: Patient presents with left lower extremity pain x3 weeks  Risk Factors: The patient has history of previous smoking (quit >10yrs ago)  FINDINGS:  Left     Impression       CFV      Normal (Patent)     CONCLUSION:  Impression: RIGHT LOWER LIMB LIMITED: Evaluation shows no evidence of thrombus in the common femoral vein  Doppler evaluation shows a normal response to augmentation maneuvers  LEFT LOWER LIMB: No evidence of acute or chronic deep vein thrombosis No evidence of superficial thrombophlebitis noted  Doppler evaluation shows a normal response to augmentation maneuvers  Popliteal, posterior tibial and anterior tibial arterial Doppler waveforms are triphasic    Technical findings were given to Dr Mccullough at 2:09 pm   SIGNATURE: Electronically Signed by: Cassidy Wong MD on 2021-04-15 02:19:13 PM

## 2021-05-03 ENCOUNTER — TELEPHONE (OUTPATIENT)
Dept: GASTROENTEROLOGY | Facility: CLINIC | Age: 71
End: 2021-05-03

## 2021-05-03 NOTE — TELEPHONE ENCOUNTER
Patient returned call leaving message about rescheduling colonoscopy  Returned call and had to leave message again  Thanks!

## 2021-05-03 NOTE — TELEPHONE ENCOUNTER
Left message for patient that I had to cancel his colonoscopy with Dr Misty Mancini on 5/27 at [facility] due to a change in his schedule  Please call patient again in 1 wk if he doesn't return call to reschedule  Thank you!

## 2021-06-09 LAB — EXT SARS-COV-2: NOT DETECTED

## 2021-06-14 ENCOUNTER — HOSPITAL ENCOUNTER (OUTPATIENT)
Dept: GASTROENTEROLOGY | Facility: AMBULATORY SURGERY CENTER | Age: 71
Discharge: HOME | End: 2021-06-14

## 2021-06-14 DIAGNOSIS — K63.5 BENIGN COLON POLYP: ICD-10-CM

## 2021-06-14 RX ORDER — SODIUM CHLORIDE 9 MG/ML
30 INJECTION, SOLUTION INTRAVENOUS CONTINUOUS
Status: DISCONTINUED | OUTPATIENT
Start: 2021-06-14 | End: 2021-06-18 | Stop reason: HOSPADM

## 2021-06-15 ENCOUNTER — TELEPHONE (OUTPATIENT)
Dept: GASTROENTEROLOGY | Facility: CLINIC | Age: 71
End: 2021-06-15

## 2021-06-15 NOTE — TELEPHONE ENCOUNTER
Called and spoke to pt to go over bowel prep and he really wants to wait to have colon when Jim Jackman is available again as that is what he had before and that really worked for him  He said ok if waiting for 6 mos if needs to  I told pt that I will cx out his procedure for July and will call his Pharmacy in 3 mos to see if it is available and would then call him to let him know

## 2021-06-15 NOTE — TELEPHONE ENCOUNTER
----- Message from Rolando Rayo RN sent at 6/14/2021  7:58 AM EDT -----  Regarding: prep  Good morning, above pt cancelled this am due to poor prep  Dav Headley rescheduled him for July 21,2021  Dr Srikanth Cotto would like him to use Trilyte   If not then miralax with gatorade/ dulcolax and mag (Mag if no kidney issues)  Emphasize no veggies, nuts or seeds few days prior  Increase fluids before during and after prep    Thanks  Rolando Rayo RN

## 2021-07-16 DIAGNOSIS — N40.0 ENLARGED PROSTATE WITHOUT LOWER URINARY TRACT SYMPTOMS (LUTS): ICD-10-CM

## 2021-07-16 RX ORDER — TAMSULOSIN HYDROCHLORIDE 0.4 MG/1
0.4 CAPSULE ORAL
Qty: 90 CAPSULE | Refills: 1 | Status: SHIPPED | OUTPATIENT
Start: 2021-07-16 | End: 2022-01-10 | Stop reason: SDUPTHER

## 2021-09-15 NOTE — TELEPHONE ENCOUNTER
Received call back from [facility] informing that they do have trilyte in  Will call pt to schedule colon

## 2021-09-15 NOTE — TELEPHONE ENCOUNTER
[address] [phone], lmom asking them to please call me back to let me know if Lio Ferrara is available for this pt  Will call them again in one week if do not hear back from them

## 2021-09-16 NOTE — TELEPHONE ENCOUNTER
I lmom for pt to please call back to schedule procedure with Dr Henry Hamlin as Daija Person prep is now available  Will call pt again in one week if do not hear back from him

## 2021-09-23 NOTE — TELEPHONE ENCOUNTER
I lmom for pt to please call back to schedule his procedure with Dr Meenu Srinivasan as his pharmacy does have the Trilyte prep  Will call pt again in two weeks if do not hear back from him

## 2021-09-23 NOTE — TELEPHONE ENCOUNTER
Pt lmom stating he wants to defer his colonoscopy for a while  He will call us when he wants to schedule

## 2021-10-29 ENCOUNTER — OFFICE VISIT (OUTPATIENT)
Dept: FAMILY MEDICINE CLINIC | Facility: CLINIC | Age: 71
End: 2021-10-29
Payer: MEDICARE

## 2021-10-29 VITALS
DIASTOLIC BLOOD PRESSURE: 80 MMHG | HEIGHT: 73 IN | WEIGHT: 217 LBS | HEART RATE: 76 BPM | TEMPERATURE: 99.8 F | BODY MASS INDEX: 28.76 KG/M2 | RESPIRATION RATE: 20 BRPM | SYSTOLIC BLOOD PRESSURE: 118 MMHG

## 2021-10-29 DIAGNOSIS — J06.9 ACUTE URI: Primary | ICD-10-CM

## 2021-10-29 DIAGNOSIS — J30.9 ALLERGIC RHINITIS, UNSPECIFIED SEASONALITY, UNSPECIFIED TRIGGER: ICD-10-CM

## 2021-10-29 DIAGNOSIS — N40.0 BENIGN PROSTATIC HYPERPLASIA WITHOUT LOWER URINARY TRACT SYMPTOMS: ICD-10-CM

## 2021-10-29 PROBLEM — M79.672 PAIN IN BOTH FEET: Status: RESOLVED | Noted: 2020-10-20 | Resolved: 2021-10-29

## 2021-10-29 PROBLEM — R60.0 LEG EDEMA, LEFT: Status: RESOLVED | Noted: 2020-12-07 | Resolved: 2021-10-29

## 2021-10-29 PROBLEM — R07.81 RIB PAIN: Status: RESOLVED | Noted: 2020-10-20 | Resolved: 2021-10-29

## 2021-10-29 PROBLEM — M79.671 PAIN IN BOTH FEET: Status: RESOLVED | Noted: 2020-10-20 | Resolved: 2021-10-29

## 2021-10-29 PROCEDURE — U0005 INFEC AGEN DETEC AMPLI PROBE: HCPCS | Performed by: FAMILY MEDICINE

## 2021-10-29 PROCEDURE — 99214 OFFICE O/P EST MOD 30 MIN: CPT | Performed by: FAMILY MEDICINE

## 2021-10-29 PROCEDURE — U0003 INFECTIOUS AGENT DETECTION BY NUCLEIC ACID (DNA OR RNA); SEVERE ACUTE RESPIRATORY SYNDROME CORONAVIRUS 2 (SARS-COV-2) (CORONAVIRUS DISEASE [COVID-19]), AMPLIFIED PROBE TECHNIQUE, MAKING USE OF HIGH THROUGHPUT TECHNOLOGIES AS DESCRIBED BY CMS-2020-01-R: HCPCS | Performed by: FAMILY MEDICINE

## 2021-10-29 RX ORDER — METHYLPREDNISOLONE 4 MG/1
TABLET ORAL
Qty: 21 TABLET | Refills: 0 | Status: SHIPPED | OUTPATIENT
Start: 2021-10-29 | End: 2021-11-04

## 2021-10-29 RX ORDER — AMOXICILLIN 875 MG/1
875 TABLET, COATED ORAL 2 TIMES DAILY
Qty: 20 TABLET | Refills: 0 | Status: SHIPPED | OUTPATIENT
Start: 2021-10-29 | End: 2021-11-08

## 2021-10-30 PROBLEM — U07.1 COVID-19 VIRUS INFECTION: Status: ACTIVE | Noted: 2021-10-30

## 2021-10-30 LAB — SARS-COV-2 RNA RESP QL NAA+PROBE: POSITIVE

## 2021-11-03 ENCOUNTER — TELEPHONE (OUTPATIENT)
Dept: FAMILY MEDICINE CLINIC | Facility: CLINIC | Age: 71
End: 2021-11-03

## 2021-11-03 ENCOUNTER — HOSPITAL ENCOUNTER (EMERGENCY)
Facility: HOSPITAL | Age: 71
Discharge: HOME | End: 2021-11-03
Attending: EMERGENCY MEDICINE
Payer: MEDICARE

## 2021-11-03 VITALS
SYSTOLIC BLOOD PRESSURE: 138 MMHG | TEMPERATURE: 99.3 F | HEART RATE: 107 BPM | DIASTOLIC BLOOD PRESSURE: 85 MMHG | RESPIRATION RATE: 18 BRPM | OXYGEN SATURATION: 93 %

## 2021-11-03 DIAGNOSIS — R11.0 NAUSEA: Primary | ICD-10-CM

## 2021-11-03 DIAGNOSIS — U07.1 COVID-19 VIRUS INFECTION: ICD-10-CM

## 2021-11-03 DIAGNOSIS — U07.1 COVID-19: Primary | ICD-10-CM

## 2021-11-03 LAB
ALBUMIN SERPL BCP-MCNC: 3.6 G/DL (ref 3.5–5)
ALP SERPL-CCNC: 85 U/L (ref 46–116)
ALT SERPL W P-5'-P-CCNC: 35 U/L (ref 12–78)
ANION GAP SERPL CALCULATED.3IONS-SCNC: 8 MMOL/L (ref 4–13)
AST SERPL W P-5'-P-CCNC: 32 U/L (ref 5–45)
BASOPHILS # BLD AUTO: 0 THOUSANDS/ΜL (ref 0–0.1)
BASOPHILS NFR BLD AUTO: 0 % (ref 0–1)
BILIRUB DIRECT SERPL-MCNC: 0.17 MG/DL (ref 0–0.2)
BILIRUB SERPL-MCNC: 0.93 MG/DL (ref 0.2–1)
BUN SERPL-MCNC: 22 MG/DL (ref 5–25)
CALCIUM SERPL-MCNC: 8.2 MG/DL (ref 8.3–10.1)
CHLORIDE SERPL-SCNC: 93 MMOL/L (ref 100–108)
CO2 SERPL-SCNC: 29 MMOL/L (ref 21–32)
CREAT SERPL-MCNC: 1 MG/DL (ref 0.6–1.3)
D DIMER PPP FEU-MCNC: 0.63 UG/ML FEU
EOSINOPHIL # BLD AUTO: 0 THOUSAND/ΜL (ref 0–0.61)
EOSINOPHIL NFR BLD AUTO: 0 % (ref 0–6)
ERYTHROCYTE [DISTWIDTH] IN BLOOD BY AUTOMATED COUNT: 12.8 % (ref 11.6–15.1)
GFR SERPL CREATININE-BSD FRML MDRD: 76 ML/MIN/1.73SQ M
GLUCOSE SERPL-MCNC: 131 MG/DL (ref 65–140)
HCT VFR BLD AUTO: 47.7 % (ref 36.5–49.3)
HGB BLD-MCNC: 16.6 G/DL (ref 12–17)
IMM GRANULOCYTES # BLD AUTO: 0.02 THOUSAND/UL (ref 0–0.2)
IMM GRANULOCYTES NFR BLD AUTO: 1 % (ref 0–2)
INR PPP: 1.02 (ref 0.84–1.19)
LYMPHOCYTES # BLD AUTO: 0.6 THOUSANDS/ΜL (ref 0.6–4.47)
LYMPHOCYTES NFR BLD AUTO: 18 % (ref 14–44)
MCH RBC QN AUTO: 32.4 PG (ref 26.8–34.3)
MCHC RBC AUTO-ENTMCNC: 34.8 G/DL (ref 31.4–37.4)
MCV RBC AUTO: 93 FL (ref 82–98)
MONOCYTES # BLD AUTO: 0.49 THOUSAND/ΜL (ref 0.17–1.22)
MONOCYTES NFR BLD AUTO: 15 % (ref 4–12)
NEUTROPHILS # BLD AUTO: 2.22 THOUSANDS/ΜL (ref 1.85–7.62)
NEUTS SEG NFR BLD AUTO: 66 % (ref 43–75)
NRBC BLD AUTO-RTO: 0 /100 WBCS
NT-PROBNP SERPL-MCNC: 278 PG/ML
PLATELET # BLD AUTO: 136 THOUSANDS/UL (ref 149–390)
PMV BLD AUTO: 9.7 FL (ref 8.9–12.7)
POTASSIUM SERPL-SCNC: 3.9 MMOL/L (ref 3.5–5.3)
PROT SERPL-MCNC: 7.5 G/DL (ref 6.4–8.2)
PROTHROMBIN TIME: 13 SECONDS (ref 11.6–14.5)
RBC # BLD AUTO: 5.12 MILLION/UL (ref 3.88–5.62)
SODIUM SERPL-SCNC: 130 MMOL/L (ref 136–145)
TROPONIN I SERPL-MCNC: 0.02 NG/ML
WBC # BLD AUTO: 3.33 THOUSAND/UL (ref 4.31–10.16)

## 2021-11-03 PROCEDURE — 85379 FIBRIN DEGRADATION QUANT: CPT | Performed by: EMERGENCY MEDICINE

## 2021-11-03 PROCEDURE — 85610 PROTHROMBIN TIME: CPT | Performed by: EMERGENCY MEDICINE

## 2021-11-03 PROCEDURE — 80048 BASIC METABOLIC PNL TOTAL CA: CPT | Performed by: EMERGENCY MEDICINE

## 2021-11-03 PROCEDURE — 83880 ASSAY OF NATRIURETIC PEPTIDE: CPT | Performed by: EMERGENCY MEDICINE

## 2021-11-03 PROCEDURE — 99284 EMERGENCY DEPT VISIT MOD MDM: CPT | Performed by: EMERGENCY MEDICINE

## 2021-11-03 PROCEDURE — 99283 EMERGENCY DEPT VISIT LOW MDM: CPT

## 2021-11-03 PROCEDURE — 80076 HEPATIC FUNCTION PANEL: CPT | Performed by: EMERGENCY MEDICINE

## 2021-11-03 PROCEDURE — 36415 COLL VENOUS BLD VENIPUNCTURE: CPT | Performed by: EMERGENCY MEDICINE

## 2021-11-03 PROCEDURE — 85025 COMPLETE CBC W/AUTO DIFF WBC: CPT | Performed by: EMERGENCY MEDICINE

## 2021-11-03 PROCEDURE — 96360 HYDRATION IV INFUSION INIT: CPT

## 2021-11-03 PROCEDURE — 84484 ASSAY OF TROPONIN QUANT: CPT | Performed by: EMERGENCY MEDICINE

## 2021-11-03 RX ORDER — ONDANSETRON 8 MG/1
8 TABLET, ORALLY DISINTEGRATING ORAL EVERY 8 HOURS PRN
Qty: 30 TABLET | Refills: 0 | Status: SHIPPED | OUTPATIENT
Start: 2021-11-03 | End: 2021-12-01

## 2021-11-03 RX ORDER — ALBUTEROL SULFATE 90 UG/1
2 AEROSOL, METERED RESPIRATORY (INHALATION) EVERY 6 HOURS PRN
Qty: 18 G | Refills: 0 | Status: SHIPPED | OUTPATIENT
Start: 2021-11-03 | End: 2022-04-28

## 2021-11-03 RX ORDER — ONDANSETRON 4 MG/1
4 TABLET, FILM COATED ORAL EVERY 6 HOURS PRN
Qty: 20 TABLET | Refills: 0 | Status: SHIPPED | OUTPATIENT
Start: 2021-11-03 | End: 2021-11-03

## 2021-11-03 RX ADMIN — SODIUM CHLORIDE 1000 ML: 0.9 INJECTION, SOLUTION INTRAVENOUS at 15:08

## 2021-11-04 ENCOUNTER — TELEPHONE (OUTPATIENT)
Dept: FAMILY MEDICINE CLINIC | Facility: CLINIC | Age: 71
End: 2021-11-04

## 2021-11-15 ENCOUNTER — TELEPHONE (OUTPATIENT)
Dept: FAMILY MEDICINE CLINIC | Facility: CLINIC | Age: 71
End: 2021-11-15

## 2021-12-01 ENCOUNTER — OFFICE VISIT (OUTPATIENT)
Dept: FAMILY MEDICINE CLINIC | Facility: CLINIC | Age: 71
End: 2021-12-01
Payer: MEDICARE

## 2021-12-01 VITALS
WEIGHT: 207 LBS | SYSTOLIC BLOOD PRESSURE: 116 MMHG | RESPIRATION RATE: 18 BRPM | HEART RATE: 81 BPM | HEIGHT: 73 IN | OXYGEN SATURATION: 96 % | DIASTOLIC BLOOD PRESSURE: 80 MMHG | TEMPERATURE: 98.4 F | BODY MASS INDEX: 27.43 KG/M2

## 2021-12-01 DIAGNOSIS — J12.82 PNEUMONIA DUE TO COVID-19 VIRUS: Primary | ICD-10-CM

## 2021-12-01 DIAGNOSIS — N40.0 BENIGN PROSTATIC HYPERPLASIA WITHOUT LOWER URINARY TRACT SYMPTOMS: ICD-10-CM

## 2021-12-01 DIAGNOSIS — J30.9 ALLERGIC RHINITIS, UNSPECIFIED SEASONALITY, UNSPECIFIED TRIGGER: ICD-10-CM

## 2021-12-01 DIAGNOSIS — U07.1 PNEUMONIA DUE TO COVID-19 VIRUS: Primary | ICD-10-CM

## 2021-12-01 DIAGNOSIS — Z86.16 HISTORY OF 2019 NOVEL CORONAVIRUS DISEASE (COVID-19): ICD-10-CM

## 2021-12-01 PROBLEM — Z96.82 SACRAL NERVE STIMULATOR PRESENT: Status: ACTIVE | Noted: 2021-11-09

## 2021-12-01 PROBLEM — J06.9 ACUTE URI: Status: RESOLVED | Noted: 2021-10-29 | Resolved: 2021-12-01

## 2021-12-01 PROBLEM — Z87.891 FORMER TOBACCO USE: Status: ACTIVE | Noted: 2021-11-09

## 2021-12-01 PROBLEM — R09.02 HYPOXIA: Status: ACTIVE | Noted: 2021-11-09

## 2021-12-01 PROCEDURE — 99214 OFFICE O/P EST MOD 30 MIN: CPT | Performed by: FAMILY MEDICINE

## 2021-12-01 RX ORDER — DEXAMETHASONE 6 MG/1
TABLET ORAL
COMMUNITY
Start: 2021-11-13 | End: 2021-12-01

## 2021-12-01 RX ORDER — ONDANSETRON 4 MG/1
TABLET, FILM COATED ORAL
COMMUNITY
Start: 2021-11-03 | End: 2021-12-01

## 2021-12-01 RX ORDER — BENZONATATE 100 MG/1
100 CAPSULE ORAL 3 TIMES DAILY PRN
COMMUNITY
Start: 2021-11-13 | End: 2021-12-16 | Stop reason: ALTCHOICE

## 2021-12-15 ENCOUNTER — APPOINTMENT (OUTPATIENT)
Dept: RADIOLOGY | Facility: CLINIC | Age: 71
End: 2021-12-15
Payer: MEDICARE

## 2021-12-15 DIAGNOSIS — U07.1 PNEUMONIA DUE TO COVID-19 VIRUS: ICD-10-CM

## 2021-12-15 DIAGNOSIS — J12.82 PNEUMONIA DUE TO COVID-19 VIRUS: ICD-10-CM

## 2021-12-15 PROCEDURE — 71046 X-RAY EXAM CHEST 2 VIEWS: CPT

## 2021-12-16 ENCOUNTER — OFFICE VISIT (OUTPATIENT)
Dept: FAMILY MEDICINE CLINIC | Facility: CLINIC | Age: 71
End: 2021-12-16
Payer: MEDICARE

## 2021-12-16 VITALS
RESPIRATION RATE: 18 BRPM | DIASTOLIC BLOOD PRESSURE: 78 MMHG | SYSTOLIC BLOOD PRESSURE: 132 MMHG | TEMPERATURE: 99.6 F | HEART RATE: 75 BPM | HEIGHT: 73 IN | WEIGHT: 207.2 LBS | BODY MASS INDEX: 27.46 KG/M2

## 2021-12-16 DIAGNOSIS — M26.621 ARTHRALGIA OF RIGHT TEMPOROMANDIBULAR JOINT: Primary | ICD-10-CM

## 2021-12-16 PROCEDURE — 99213 OFFICE O/P EST LOW 20 MIN: CPT | Performed by: NURSE PRACTITIONER

## 2021-12-16 RX ORDER — METHYLPREDNISOLONE 4 MG/1
TABLET ORAL
Qty: 21 TABLET | Refills: 0 | Status: SHIPPED | OUTPATIENT
Start: 2021-12-16 | End: 2022-04-28

## 2022-01-10 DIAGNOSIS — N40.0 ENLARGED PROSTATE WITHOUT LOWER URINARY TRACT SYMPTOMS (LUTS): ICD-10-CM

## 2022-01-10 RX ORDER — TAMSULOSIN HYDROCHLORIDE 0.4 MG/1
0.4 CAPSULE ORAL
Qty: 90 CAPSULE | Refills: 1 | Status: SHIPPED | OUTPATIENT
Start: 2022-01-10 | End: 2022-01-13 | Stop reason: SDUPTHER

## 2022-01-13 RX ORDER — TAMSULOSIN HYDROCHLORIDE 0.4 MG/1
0.4 CAPSULE ORAL
Qty: 90 CAPSULE | Refills: 1 | Status: SHIPPED | OUTPATIENT
Start: 2022-01-13 | End: 2022-04-29 | Stop reason: SDUPTHER

## 2022-01-13 NOTE — TELEPHONE ENCOUNTER
I changed the pharmacy and removed the other one   Please send to the new address [facility]

## 2022-04-28 PROBLEM — R09.02 HYPOXIA: Status: RESOLVED | Noted: 2021-11-09 | Resolved: 2022-04-28

## 2022-04-29 ENCOUNTER — OFFICE VISIT (OUTPATIENT)
Dept: FAMILY MEDICINE CLINIC | Facility: CLINIC | Age: 72
End: 2022-04-29
Payer: MEDICARE

## 2022-04-29 VITALS
RESPIRATION RATE: 16 BRPM | OXYGEN SATURATION: 96 % | BODY MASS INDEX: 30.09 KG/M2 | HEART RATE: 75 BPM | TEMPERATURE: 97.5 F | HEIGHT: 73 IN | WEIGHT: 227 LBS | DIASTOLIC BLOOD PRESSURE: 80 MMHG | SYSTOLIC BLOOD PRESSURE: 120 MMHG

## 2022-04-29 DIAGNOSIS — J30.9 ALLERGIC RHINITIS, UNSPECIFIED SEASONALITY, UNSPECIFIED TRIGGER: ICD-10-CM

## 2022-04-29 DIAGNOSIS — Z00.00 MEDICARE ANNUAL WELLNESS VISIT, SUBSEQUENT: Primary | ICD-10-CM

## 2022-04-29 DIAGNOSIS — N40.0 BENIGN PROSTATIC HYPERPLASIA WITHOUT LOWER URINARY TRACT SYMPTOMS: ICD-10-CM

## 2022-04-29 DIAGNOSIS — M72.2 PLANTAR FASCIITIS OF LEFT FOOT: ICD-10-CM

## 2022-04-29 PROBLEM — U07.1 PNEUMONIA DUE TO COVID-19 VIRUS: Status: RESOLVED | Noted: 2021-11-09 | Resolved: 2022-04-29

## 2022-04-29 PROBLEM — J12.82 PNEUMONIA DUE TO COVID-19 VIRUS: Status: RESOLVED | Noted: 2021-11-09 | Resolved: 2022-04-29

## 2022-04-29 PROCEDURE — 99214 OFFICE O/P EST MOD 30 MIN: CPT | Performed by: FAMILY MEDICINE

## 2022-04-29 PROCEDURE — G0439 PPPS, SUBSEQ VISIT: HCPCS | Performed by: FAMILY MEDICINE

## 2022-04-29 RX ORDER — FLUTICASONE PROPIONATE 50 MCG
2 SPRAY, SUSPENSION (ML) NASAL DAILY PRN
Qty: 16 G | Refills: 5 | Status: SHIPPED | OUTPATIENT
Start: 2022-04-29

## 2022-04-29 RX ORDER — TAMSULOSIN HYDROCHLORIDE 0.4 MG/1
0.4 CAPSULE ORAL
Qty: 90 CAPSULE | Refills: 1 | Status: SHIPPED | OUTPATIENT
Start: 2022-04-29

## 2022-04-29 RX ORDER — NAPROXEN 500 MG/1
500 TABLET ORAL 2 TIMES DAILY WITH MEALS
Qty: 30 TABLET | Refills: 0 | Status: SHIPPED | OUTPATIENT
Start: 2022-04-29

## 2022-04-29 NOTE — PROGRESS NOTES
Assessment/Plan:    No problem-specific Assessment & Plan notes found for this encounter  Left plantar fascitis, instructions given for stretch, shoewear, support, ice, and possible referral to podiatry if no better  Naproxen short term, nsaid risks aware    AR stable    BPH stable, continue flomax     Diagnoses and all orders for this visit:    Medicare annual wellness visit, subsequent    Benign prostatic hyperplasia without lower urinary tract symptoms  -     tamsulosin (Flomax) 0 4 mg; Take 1 capsule (0 4 mg total) by mouth daily with dinner    Plantar fasciitis of left foot  -     naproxen (Naprosyn) 500 mg tablet; Take 1 tablet (500 mg total) by mouth 2 (two) times a day with meals    Allergic rhinitis, unspecified seasonality, unspecified trigger  -     fluticasone (FLONASE) 50 mcg/act nasal spray; 2 sprays into each nostril daily as needed for allergies      He can all for PT or podiatry referral if no better or xr heel for heel spur    No follow-ups on file  Subjective:      Patient ID: Kallie Salgado is a 81 y o  male  Chief Complaint   Patient presents with    Foot Pain     wmcma       HPI  Left foot/heel pain  Was fishing, [place], 2w ago  Started shortly after  Casey Palin in am but pain upon first few steps  No specific trauma  No ice tried  Some advil prn  Never before  Sharp like px upon WB  Not entirely gone after some walking    The following portions of the patient's history were reviewed and updated as appropriate: allergies, current medications, past family history, past medical history, past social history, past surgical history and problem list     Review of Systems   Constitutional: Negative for chills and fever           Current Outpatient Medications   Medication Sig Dispense Refill    fluticasone (FLONASE) 50 mcg/act nasal spray 2 sprays into each nostril daily as needed for allergies 16 g 5    tamsulosin (Flomax) 0 4 mg Take 1 capsule (0 4 mg total) by mouth daily with dinner 90 capsule 1    naproxen (Naprosyn) 500 mg tablet Take 1 tablet (500 mg total) by mouth 2 (two) times a day with meals 30 tablet 0     No current facility-administered medications for this visit  Objective:    /80   Pulse 75   Temp 97 5 °F (36 4 °C)   Resp 16   Ht 6' 0 5" (1 842 m)   Wt 103 kg (227 lb)   SpO2 96%   BMI 30 36 kg/m²        Physical Exam  Vitals and nursing note reviewed  Constitutional:       General: He is not in acute distress  Appearance: He is well-developed  He is not ill-appearing  HENT:      Head: Normocephalic  Right Ear: External ear normal  There is no impacted cerumen  Left Ear: External ear normal  There is no impacted cerumen  Eyes:      General: No scleral icterus  Conjunctiva/sclera: Conjunctivae normal    Cardiovascular:      Rate and Rhythm: Normal rate and regular rhythm  Pulmonary:      Effort: Pulmonary effort is normal  No respiratory distress  Breath sounds: No wheezing  Abdominal:      General: There is no distension  Palpations: Abdomen is soft  There is no mass  Tenderness: There is no abdominal tenderness  There is no guarding  Musculoskeletal:         General: Tenderness present  No deformity  Cervical back: Neck supple  Comments: Left anterior calcaneus area pain   Skin:     General: Skin is warm and dry  Coloration: Skin is not pale  Neurological:      Mental Status: He is alert  Psychiatric:         Mood and Affect: Mood normal          Behavior: Behavior normal          Thought Content:  Thought content normal                 Glenn Martinez DO

## 2022-04-30 NOTE — PROGRESS NOTES
Assessment and Plan:     Problem List Items Addressed This Visit        Active Problems    Allergic rhinitis    Relevant Medications    fluticasone (FLONASE) 50 mcg/act nasal spray    naproxen (Naprosyn) 500 mg tablet    Benign prostatic hyperplasia without lower urinary tract symptoms    Relevant Medications    tamsulosin (Flomax) 0 4 mg    Medicare annual wellness visit, subsequent - Primary    Plantar fasciitis of left foot    Relevant Medications    naproxen (Naprosyn) 500 mg tablet           Preventive health issues were discussed with patient, and age appropriate screening tests were ordered as noted in patient's After Visit Summary  Personalized health advice and appropriate referrals for health education or preventive services given if needed, as noted in patient's After Visit Summary  History of Present Illness:     Patient presents for Medicare Annual Wellness visit    Patient Care Team:  Briseyda Luna DO as PCP - Beckey Katz, MD Raechel Apley, MD (Gastroenterology)     Problem List:     Patient Active Problem List   Diagnosis    Allergic rhinitis    Benign colon polyp    Colon, diverticulosis    Benign prostatic hyperplasia without lower urinary tract symptoms    Low HDL (under 40)    History of malignant melanoma of skin    Renal colic    Nerve entrapment syndrome, inguinal, right    Screening for cardiovascular, respiratory, and genitourinary diseases    Screening for diabetes mellitus (DM)    Prostate cancer screening    Colon cancer screening    S/P TURP    Medicare annual wellness visit, subsequent    History of 2019 novel coronavirus disease (COVID-19)    Former tobacco use    Sacral nerve stimulator present    Plantar fasciitis of left foot      Past Medical and Surgical History:     Past Medical History:   Diagnosis Date    Cancer ([facility] [address] ) 2011    melanoma of skin of left shoulder   basal  cell of face7/2/20    Colon polyp     Dental abscess     started 4 weeks ago  on RX    Enlarged prostate     Urinary bladder disorder      Past Surgical History:   Procedure Laterality Date    CATARACT EXTRACTION      COLONOSCOPY      HERNIA REPAIR      MASS EXCISION      melanoma of left shoulder     OK REMV RESID OBSTRUC PROSTATE,>1 YR N/A 2020    Procedure: CYSTOSCOPY, TRANSURETHRAL RESECTION OF RESIDUAL OR REGROWTH OF OBSTRUCTIVE PROSTATE TISSUE;  Surgeon: Milford Hammans, MD;  Location: [address] [place] [address];  Service: Urology    OK TRANSURETHRAL ELEC-SURG PROSTATECTOM N/A 10/8/2019    Procedure: CYSTOSCOPY, TRANSURETHRAL RESECTION OF PROSTATE (TURP); Surgeon: Milford Hammans, MD;  Location: [address] [place] [address];  Service: Urology    OK XCAPSL CTRC RMVL INSJ IO LENS PROSTH W/O ECP Right 6/15/2020    Procedure: EXTRACTION EXTRACAPSULAR CATARACT PHACO INTRAOCULAR LENS (IOL);   Surgeon: Lee Bowles MD;  Location: [facility] MAIN OR;  Service: Ophthalmology    TONSILLECTOMY        Family History:     Family History   Problem Relation Age of Onset    Diabetes Mother     Heart failure Father     Hypertension Father     No Known Problems Sister     Diabetes Brother       Social History:     Social History     Socioeconomic History    Marital status: Single     Spouse name: None    Number of children: None    Years of education: None    Highest education level: None   Occupational History    None   Tobacco Use    Smoking status: Former Smoker     Packs/day: 1 00     Types: Cigarettes     Quit date: 2016     Years since quittin 7    Smokeless tobacco: Never Used   Vaping Use    Vaping Use: Never used   Substance and Sexual Activity    Alcohol use: Never    Drug use: Never    Sexual activity: Not Currently   Other Topics Concern    None   Social History Narrative    None     Social Determinants of Health     Financial Resource Strain: Not on file   Food Insecurity: Not on file   Transportation Needs: Not on file   Physical Activity: Not on file   Stress: Not on file   Social Connections: Not on file   Intimate Partner Violence: Not on file   Housing Stability: Not on file      Medications and Allergies:     Current Outpatient Medications   Medication Sig Dispense Refill    fluticasone (FLONASE) 50 mcg/act nasal spray 2 sprays into each nostril daily as needed for allergies 16 g 5    tamsulosin (Flomax) 0 4 mg Take 1 capsule (0 4 mg total) by mouth daily with dinner 90 capsule 1    naproxen (Naprosyn) 500 mg tablet Take 1 tablet (500 mg total) by mouth 2 (two) times a day with meals 30 tablet 0     No current facility-administered medications for this visit  Allergies   Allergen Reactions    Aspirin GI Intolerance     Other reaction(s): GI upset  Reaction Date: [id number]; Other reaction(s): gi complications  Reaction Date: [id number];       Immunizations:     Immunization History   Administered Date(s) Administered    COVID-19 PFIZER VACCINE 0 3 ML IM 12/06/2021, 12/27/2021    INFLUENZA 10/01/2003    Tdap 09/27/2010      Health Maintenance:         Topic Date Due    Hepatitis C Screening  Never done    Colorectal Cancer Screening  10/30/2016         Topic Date Due    Pneumococcal Vaccine: 65+ Years (1 of 1 - PPSV23) Never done    Influenza Vaccine (1) 09/01/2021      Medicare Health Risk Assessment:     /80   Pulse 75   Temp 97 5 °F (36 4 °C)   Resp 16   Ht 6' 0 5" (1 842 m)   Wt 103 kg (227 lb)   SpO2 96%   BMI 30 36 kg/m²      Shantel Saldivar is here for his Subsequent Wellness visit  Last Medicare Wellness visit information reviewed, patient interviewed and updates made to the record today  Health Risk Assessment:   Patient rates overall health as excellent  Patient feels that their physical health rating is much better  Patient is very satisfied with their life  Eyesight was rated as same  Hearing was rated as same  Patient feels that their emotional and mental health rating is same  Patients states they are never, rarely angry   Patient states they are never, rarely unusually tired/fatigued  Pain experienced in the last 7 days has been none  Patient states that he has experienced no weight loss or gain in last 6 months  Depression Screening:   PHQ-2 Score: 0      Fall Risk Screening: In the past year, patient has experienced: no history of falling in past year      Home Safety:  Patient does not have trouble with stairs inside or outside of their home  Patient has working smoke alarms and has working carbon monoxide detector  Home safety hazards include: none  Nutrition:   Current diet is Regular  Medications:   Patient is not currently taking any over-the-counter supplements  Patient is able to manage medications  Activities of Daily Living (ADLs)/Instrumental Activities of Daily Living (IADLs):   Walk and transfer into and out of bed and chair?: Yes  Dress and groom yourself?: Yes    Bathe or shower yourself?: Yes    Feed yourself?  Yes  Do your laundry/housekeeping?: Yes  Manage your money, pay your bills and track your expenses?: Yes  Make your own meals?: Yes    Do your own shopping?: Yes    Previous Hospitalizations:   Any hospitalizations or ED visits within the last 12 months?: Yes    How many hospitalizations have you had in the last year?: 1-2    Advance Care Planning:   Living will: No    Durable POA for healthcare: No    End of Life Decisions reviewed with patient: No      Cognitive Screening:   Provider or family/friend/caregiver concerned regarding cognition?: No    PREVENTIVE SCREENINGS      Cardiovascular Screening:    General: Screening Current      Diabetes Screening:     General: Screening Current      Colorectal Cancer Screening:     General: Risks and Benefits Discussed      Osteoporosis Screening:    General: Screening Not Indicated      Abdominal Aortic Aneurysm (AAA) Screening:    Risk factors include: age between 73-69 yo and tobacco use        General: Screening Not Indicated      Lung Cancer Screening: General: Screening Not Indicated      Hepatitis C Screening:    General: Patient Declines    Hep C Screening Accepted: No     Screening, Brief Intervention, and Referral to Treatment (SBIRT)    Screening  Typical number of drinks in a day: 0  Typical number of drinks in a week: 0  Interpretation: Low risk drinking behavior  Single Item Drug Screening:  How often have you used an illegal drug (including marijuana) or a prescription medication for non-medical reasons in the past year? never    Single Item Drug Screen Score: 0  Interpretation: Negative screen for possible drug use disorder    Other Counseling Topics:   Car/seat belt/driving safety and regular weightbearing exercise         Gisella Armstrong,

## 2022-04-30 NOTE — PATIENT INSTRUCTIONS

## 2022-06-04 NOTE — DISCHARGE INSTRUCTIONS
Please follow-up with your primary care provider for recommendations to manage her chronic bilateral lower extremity swelling  In addition to the compression stockings, your physician may consider placing you on diuretics  Please return to the emergency department for the following, but not limited to worsening swelling, shortness of breath, chest pain, lightheadedness, dizziness 
PAST SURGICAL HISTORY:  Gallbladder calculus     S/P herniorrhaphy     S/P tonsillectomy

## 2022-10-12 PROBLEM — Z00.00 MEDICARE ANNUAL WELLNESS VISIT, SUBSEQUENT: Status: RESOLVED | Noted: 2021-04-15 | Resolved: 2022-10-12

## 2022-10-17 ENCOUNTER — OFFICE VISIT (OUTPATIENT)
Dept: FAMILY MEDICINE CLINIC | Facility: CLINIC | Age: 72
End: 2022-10-17
Payer: MEDICARE

## 2022-10-17 VITALS
HEART RATE: 77 BPM | BODY MASS INDEX: 28.36 KG/M2 | HEIGHT: 73 IN | DIASTOLIC BLOOD PRESSURE: 80 MMHG | WEIGHT: 214 LBS | SYSTOLIC BLOOD PRESSURE: 130 MMHG | TEMPERATURE: 98.5 F | OXYGEN SATURATION: 99 % | RESPIRATION RATE: 16 BRPM

## 2022-10-17 DIAGNOSIS — B02.9 HERPES ZOSTER WITHOUT COMPLICATION: Primary | ICD-10-CM

## 2022-10-17 PROCEDURE — 99213 OFFICE O/P EST LOW 20 MIN: CPT | Performed by: FAMILY MEDICINE

## 2022-10-17 RX ORDER — GABAPENTIN 300 MG/1
300 CAPSULE ORAL 2 TIMES DAILY
Qty: 30 CAPSULE | Refills: 0 | Status: SHIPPED | OUTPATIENT
Start: 2022-10-17 | End: 2022-10-27

## 2022-10-17 RX ORDER — VALACYCLOVIR HYDROCHLORIDE 1 G/1
1000 TABLET, FILM COATED ORAL 3 TIMES DAILY
Qty: 21 TABLET | Refills: 0 | Status: SHIPPED | OUTPATIENT
Start: 2022-10-17 | End: 2022-10-24

## 2022-10-17 NOTE — PROGRESS NOTES
Name: Riya Pete      : 1950      MRN: [id number]  Encounter Provider: Darwin Duran DO  Encounter Date: 10/17/2022   Encounter department: [address] [place] [address]     1  Herpes zoster without complication  Comments:  Risks and benefits of treatment discussed  Patient sent directly to his ophthalmologist office for evaluation  Orders:  -     valACYclovir (VALTREX) 1,000 mg tablet; Take 1 tablet (1,000 mg total) by mouth 3 (three) times a day for 7 days  -     gabapentin (Neurontin) 300 mg capsule; Take 1 capsule (300 mg total) by mouth 2 (two) times a day         Subjective      He started with blistering on the left side of face  Now his eye is swollen  The rash is only on the left side of his face  He is using an eye wash  He is taking ibuprofen for pain  Review of Systems    Current Outpatient Medications on File Prior to Visit   Medication Sig   • fluticasone (FLONASE) 50 mcg/act nasal spray 2 sprays into each nostril daily as needed for allergies   • tamsulosin (Flomax) 0 4 mg Take 1 capsule (0 4 mg total) by mouth daily with dinner   • naproxen (Naprosyn) 500 mg tablet Take 1 tablet (500 mg total) by mouth 2 (two) times a day with meals (Patient not taking: Reported on 10/17/2022)       Objective     /80   Pulse 77   Temp 98 5 °F (36 9 °C)   Resp 16   Ht 6' 0 5" (1 842 m)   Wt 97 1 kg (214 lb)   SpO2 99%   BMI 28 62 kg/m²     Physical Exam  Vitals and nursing note reviewed  Constitutional:       Appearance: He is well-developed  Cardiovascular:      Rate and Rhythm: Normal rate and regular rhythm  Heart sounds: Normal heart sounds  Pulmonary:      Effort: Pulmonary effort is normal  No respiratory distress  Breath sounds: Normal breath sounds  No wheezing  Neurological:      Deep Tendon Reflexes: Abnormal reflex:        Rash on left side of face starting below I going across top of head  Erythematous with blisters    See pictures Abhi Thom, DO

## 2022-10-19 ENCOUNTER — TELEPHONE (OUTPATIENT)
Dept: FAMILY MEDICINE CLINIC | Facility: CLINIC | Age: 72
End: 2022-10-19

## 2022-10-19 NOTE — TELEPHONE ENCOUNTER
The prior recommendations are the same    He should take the medications that were given (since it works throughout the body)

## 2022-10-19 NOTE — TELEPHONE ENCOUNTER
Patient saw Dr Jenifer Lord for Shingles on face yesterday, also seen by Dr Sim Cowden  Today he stated it is going to his right eye now  He would like some advice  Please call patient back      Thank You

## 2022-10-27 DIAGNOSIS — B02.9 HERPES ZOSTER WITHOUT COMPLICATION: ICD-10-CM

## 2022-10-27 RX ORDER — GABAPENTIN 300 MG/1
CAPSULE ORAL
Qty: 30 CAPSULE | Refills: 0 | Status: SHIPPED | OUTPATIENT
Start: 2022-10-27

## 2022-11-11 ENCOUNTER — OFFICE VISIT (OUTPATIENT)
Dept: FAMILY MEDICINE CLINIC | Facility: CLINIC | Age: 72
End: 2022-11-11

## 2022-11-11 VITALS
SYSTOLIC BLOOD PRESSURE: 132 MMHG | BODY MASS INDEX: 29.1 KG/M2 | DIASTOLIC BLOOD PRESSURE: 82 MMHG | HEART RATE: 59 BPM | HEIGHT: 73 IN | TEMPERATURE: 97.9 F | RESPIRATION RATE: 18 BRPM | OXYGEN SATURATION: 97 % | WEIGHT: 219.6 LBS

## 2022-11-11 DIAGNOSIS — B02.9 VZV (VARICELLA-ZOSTER VIRUS) INFECTION: ICD-10-CM

## 2022-11-11 DIAGNOSIS — B02.29 PHN (POSTHERPETIC NEURALGIA): Primary | ICD-10-CM

## 2022-11-11 DIAGNOSIS — N40.0 BENIGN PROSTATIC HYPERPLASIA WITHOUT LOWER URINARY TRACT SYMPTOMS: ICD-10-CM

## 2022-11-11 DIAGNOSIS — B02.22 TRIGEMINAL HERPES ZOSTER: ICD-10-CM

## 2022-11-11 DIAGNOSIS — Z12.11 COLON CANCER SCREENING: ICD-10-CM

## 2022-11-11 PROBLEM — Z86.16 HISTORY OF 2019 NOVEL CORONAVIRUS DISEASE (COVID-19): Status: RESOLVED | Noted: 2021-10-30 | Resolved: 2022-11-11

## 2022-11-11 RX ORDER — GABAPENTIN 300 MG/1
300 CAPSULE ORAL 3 TIMES DAILY
Qty: 90 CAPSULE | Refills: 5 | Status: SHIPPED | OUTPATIENT
Start: 2022-11-11

## 2022-11-11 NOTE — PATIENT INSTRUCTIONS
Please restart the gabapentin at 300mg at bedtime for 3 days then take 300mg twice a day for 3 days then take 300mg three times a day thereafter

## 2022-11-11 NOTE — PROGRESS NOTES
Assessment/Plan:    No problem-specific Assessment & Plan notes found for this encounter  PHN  Willing to restart gabapentin titration as instructed  F/u in 1m if not effective  Start 100mg qd x 3d then bid x 3d then tid  Risks/benefits discussed    bph stable on flomax    AR stable on flonase    Please restart the gabapentin at 300mg at bedtime for 3 days then take 300mg twice a day for 3 days then take 300mg three times a day thereafter  Diagnoses and all orders for this visit:    PHN (postherpetic neuralgia)    Colon cancer screening  -     Ambulatory referral for colonoscopy; Future    Benign prostatic hyperplasia without lower urinary tract symptoms    VZV (varicella-zoster virus) infection    Trigeminal herpes zoster  Comments:  Risks and benefits of treatment discussed  Patient sent directly to his ophthalmologist office for evaluation  Orders:  -     gabapentin (NEURONTIN) 300 mg capsule; Take 1 capsule (300 mg total) by mouth 3 (three) times a day        No follow-ups on file  Subjective:      Patient ID: Cassius Jarquin is a 81 y o  male  Chief Complaint   Patient presents with   • Herpes Zoster     Shingles on face  jmcma       HPI  Still with pain and headache in area  Sensitive to touch  All day, worse at night  No fever  Has been 1m  Took gabapentin 300mg bid and tolerated, finished few days ago, no different since stopping    The following portions of the patient's history were reviewed and updated as appropriate: allergies, current medications, past family history, past medical history, past social history, past surgical history and problem list     Review of Systems   Constitutional: Negative for fever  Eyes: Negative for redness           Current Outpatient Medications   Medication Sig Dispense Refill   • fluticasone (FLONASE) 50 mcg/act nasal spray 2 sprays into each nostril daily as needed for allergies 16 g 5   • gabapentin (NEURONTIN) 300 mg capsule Take 1 capsule (300 mg total) by mouth 3 (three) times a day 90 capsule 5   • tamsulosin (Flomax) 0 4 mg Take 1 capsule (0 4 mg total) by mouth daily with dinner 90 capsule 1     No current facility-administered medications for this visit  Objective:    /82   Pulse 59   Temp 97 9 °F (36 6 °C)   Resp 18   Ht 6' 0 5" (1 842 m)   Wt 99 6 kg (219 lb 9 6 oz)   SpO2 97%   BMI 29 37 kg/m²        Physical Exam  Vitals and nursing note reviewed  Constitutional:       General: He is not in acute distress  Appearance: He is well-developed  He is not ill-appearing  HENT:      Head: Normocephalic  Right Ear: Tympanic membrane normal       Left Ear: Tympanic membrane normal    Eyes:      General: No scleral icterus  Left eye: No discharge  Conjunctiva/sclera: Conjunctivae normal    Cardiovascular:      Rate and Rhythm: Normal rate and regular rhythm  Pulmonary:      Effort: Pulmonary effort is normal  No respiratory distress  Breath sounds: No wheezing  Abdominal:      Palpations: Abdomen is soft  Musculoskeletal:         General: No deformity  Cervical back: Neck supple  Skin:     General: Skin is warm and dry  Coloration: Skin is not pale  Findings: Erythema and rash present  Comments: Left facial subacute redness  No vesicles  Mild skin swelling in periorbital area   Neurological:      Mental Status: He is alert  Motor: No weakness  Gait: Gait normal    Psychiatric:         Mood and Affect: Mood normal          Behavior: Behavior normal          Thought Content:  Thought content normal                 Naoma Carrera, DO

## 2022-12-23 DIAGNOSIS — N40.0 BENIGN PROSTATIC HYPERPLASIA WITHOUT LOWER URINARY TRACT SYMPTOMS: ICD-10-CM

## 2022-12-24 RX ORDER — TAMSULOSIN HYDROCHLORIDE 0.4 MG/1
CAPSULE ORAL
Qty: 90 CAPSULE | Refills: 1 | Status: SHIPPED | OUTPATIENT
Start: 2022-12-24

## 2023-02-14 ENCOUNTER — OFFICE VISIT (OUTPATIENT)
Dept: FAMILY MEDICINE CLINIC | Facility: CLINIC | Age: 73
End: 2023-02-14

## 2023-02-14 VITALS
TEMPERATURE: 97.3 F | DIASTOLIC BLOOD PRESSURE: 78 MMHG | HEIGHT: 73 IN | BODY MASS INDEX: 29.95 KG/M2 | HEART RATE: 74 BPM | OXYGEN SATURATION: 98 % | SYSTOLIC BLOOD PRESSURE: 134 MMHG | RESPIRATION RATE: 16 BRPM | WEIGHT: 226 LBS

## 2023-02-14 DIAGNOSIS — Z12.5 PROSTATE CANCER SCREENING: ICD-10-CM

## 2023-02-14 DIAGNOSIS — B02.29 PHN (POSTHERPETIC NEURALGIA): ICD-10-CM

## 2023-02-14 DIAGNOSIS — N40.0 BENIGN PROSTATIC HYPERPLASIA WITHOUT LOWER URINARY TRACT SYMPTOMS: ICD-10-CM

## 2023-02-14 DIAGNOSIS — Z13.1 SCREENING FOR DIABETES MELLITUS (DM): ICD-10-CM

## 2023-02-14 DIAGNOSIS — Z13.6 SCREENING FOR CARDIOVASCULAR, RESPIRATORY, AND GENITOURINARY DISEASES: ICD-10-CM

## 2023-02-14 DIAGNOSIS — J20.9 ACUTE BRONCHITIS, UNSPECIFIED ORGANISM: Primary | ICD-10-CM

## 2023-02-14 DIAGNOSIS — Z13.83 SCREENING FOR CARDIOVASCULAR, RESPIRATORY, AND GENITOURINARY DISEASES: ICD-10-CM

## 2023-02-14 DIAGNOSIS — Z13.89 SCREENING FOR CARDIOVASCULAR, RESPIRATORY, AND GENITOURINARY DISEASES: ICD-10-CM

## 2023-02-14 RX ORDER — AZITHROMYCIN 250 MG/1
TABLET, FILM COATED ORAL
Qty: 6 TABLET | Refills: 0 | Status: SHIPPED | OUTPATIENT
Start: 2023-02-14 | End: 2023-02-19

## 2023-02-14 NOTE — PROGRESS NOTES
Assessment/Plan:    No problem-specific Assessment & Plan notes found for this encounter  Bronchitis  mucinex DM  Avoid sudafed with bph  zpack  F/u if no better    Left facial PHN  Tolerates  Not interested in gabapentin at this time    bph is stable on flomax     Diagnoses and all orders for this visit:    Acute bronchitis, unspecified organism  -     azithromycin (ZITHROMAX) 250 mg tablet; Take 500mg on day 1, 250mg on days 2-5    Screening for cardiovascular, respiratory, and genitourinary diseases  -     Lipid Panel with Direct LDL reflex; Future    Screening for diabetes mellitus (DM)  -     Comprehensive metabolic panel; Future    Prostate cancer screening  -     PSA, Total Screen; Future    Benign prostatic hyperplasia without lower urinary tract symptoms    PHN (postherpetic neuralgia)        Return in about 2 months (around 5/1/2023) for OVL  Subjective:      Patient ID: Mariam Haas is a 72 y o  male  Chief Complaint   Patient presents with   • Cough   • Nasal Congestion     Onset: 5 days  klcma       HPI  Still some left face discomfort post shingles  Has stopped gabapentin for now    [address] [facility] s[facility]  5d  Chest cold  covid in past   Cough  mucousy  Yellowish    No otc tried  Some advil    The following portions of the patient's history were reviewed and updated as appropriate: allergies, current medications, past family history, past medical history, past social history, past surgical history and problem list     Review of Systems   Constitutional: Negative for fever  Respiratory: Positive for cough  Negative for wheezing            Current Outpatient Medications   Medication Sig Dispense Refill   • azithromycin (ZITHROMAX) 250 mg tablet Take 500mg on day 1, 250mg on days 2-5 6 tablet 0   • fluticasone (FLONASE) 50 mcg/act nasal spray 2 sprays into each nostril daily as needed for allergies 16 g 5   • tamsulosin (FLOMAX) 0 4 mg take 1 capsule by mouth once daily WITH DINNER 90 capsule 1     No current facility-administered medications for this visit  Objective:    /78   Pulse 74   Temp (!) 97 3 °F (36 3 °C)   Resp 16   Ht 6' 0 5" (1 842 m)   Wt 103 kg (226 lb)   SpO2 98%   BMI 30 23 kg/m²        Physical Exam  Vitals and nursing note reviewed  Constitutional:       General: He is not in acute distress  Appearance: He is well-developed  He is not ill-appearing  HENT:      Head: Normocephalic  Right Ear: Tympanic membrane normal       Left Ear: Tympanic membrane normal    Eyes:      General: No scleral icterus  Conjunctiva/sclera: Conjunctivae normal    Cardiovascular:      Rate and Rhythm: Normal rate and regular rhythm  Pulmonary:      Effort: Pulmonary effort is normal  No respiratory distress  Breath sounds: No wheezing  Comments: Coarse midline cough present  Abdominal:      Palpations: Abdomen is soft  Musculoskeletal:         General: No deformity  Cervical back: Neck supple  Right lower leg: No edema  Left lower leg: No edema  Skin:     General: Skin is warm and dry  Coloration: Skin is not pale  Neurological:      Mental Status: He is alert  Psychiatric:         Behavior: Behavior normal          Thought Content:  Thought content normal                 Pari Hidalgo DO

## 2023-02-18 LAB
ALBUMIN SERPL-MCNC: 4.4 G/DL (ref 3.7–4.7)
ALBUMIN/GLOB SERPL: 1.6 {RATIO} (ref 1.2–2.2)
ALP SERPL-CCNC: 92 IU/L (ref 44–121)
ALT SERPL-CCNC: 29 IU/L (ref 0–44)
AST SERPL-CCNC: 25 IU/L (ref 0–40)
BILIRUB SERPL-MCNC: 0.8 MG/DL (ref 0–1.2)
BUN SERPL-MCNC: 15 MG/DL (ref 8–27)
BUN/CREAT SERPL: 19 (ref 10–24)
CALCIUM SERPL-MCNC: 9.4 MG/DL (ref 8.6–10.2)
CHLORIDE SERPL-SCNC: 101 MMOL/L (ref 96–106)
CHOLEST SERPL-MCNC: 168 MG/DL (ref 100–199)
CO2 SERPL-SCNC: 24 MMOL/L (ref 20–29)
CREAT SERPL-MCNC: 0.81 MG/DL (ref 0.76–1.27)
EGFR: 94 ML/MIN/1.73
GLOBULIN SER-MCNC: 2.7 G/DL (ref 1.5–4.5)
GLUCOSE SERPL-MCNC: 113 MG/DL (ref 70–99)
HDLC SERPL-MCNC: 30 MG/DL
LDLC SERPL CALC-MCNC: 124 MG/DL (ref 0–99)
MICRODELETION SYND BLD/T FISH: NORMAL
POTASSIUM SERPL-SCNC: 4.2 MMOL/L (ref 3.5–5.2)
PROT SERPL-MCNC: 7.1 G/DL (ref 6–8.5)
PSA SERPL-MCNC: 0.3 NG/ML (ref 0–4)
SL AMB VLDL CHOLESTEROL CALC: 14 MG/DL (ref 5–40)
SODIUM SERPL-SCNC: 139 MMOL/L (ref 134–144)
TRIGL SERPL-MCNC: 72 MG/DL (ref 0–149)

## 2023-04-29 PROBLEM — B02.9 VZV (VARICELLA-ZOSTER VIRUS) INFECTION: Status: RESOLVED | Noted: 2022-11-11 | Resolved: 2023-04-29

## 2023-04-29 RX ORDER — BENZONATATE 100 MG/1
100 CAPSULE ORAL 3 TIMES DAILY PRN
COMMUNITY
Start: 2023-04-26 | End: 2023-05-01

## 2023-04-29 RX ORDER — DOXYCYCLINE 100 MG/1
100 TABLET ORAL 2 TIMES DAILY
COMMUNITY
Start: 2023-04-26 | End: 2023-05-01

## 2023-04-29 RX ORDER — PREDNISONE 20 MG/1
60 TABLET ORAL DAILY
COMMUNITY
Start: 2023-04-26 | End: 2023-05-01

## 2023-04-29 RX ORDER — ALBUTEROL SULFATE 2.5 MG/3ML
2.5 SOLUTION RESPIRATORY (INHALATION) EVERY 6 HOURS PRN
COMMUNITY
Start: 2023-04-26 | End: 2023-05-01

## 2023-04-29 RX ORDER — ALBUTEROL SULFATE 90 UG/1
2 AEROSOL, METERED RESPIRATORY (INHALATION) EVERY 6 HOURS PRN
COMMUNITY
Start: 2023-04-26 | End: 2023-05-01

## 2023-04-29 RX ORDER — AZITHROMYCIN 250 MG/1
TABLET, FILM COATED ORAL
COMMUNITY
Start: 2023-04-26 | End: 2023-05-01

## 2023-05-01 ENCOUNTER — OFFICE VISIT (OUTPATIENT)
Dept: FAMILY MEDICINE CLINIC | Facility: CLINIC | Age: 73
End: 2023-05-01

## 2023-05-01 VITALS
RESPIRATION RATE: 18 BRPM | SYSTOLIC BLOOD PRESSURE: 120 MMHG | DIASTOLIC BLOOD PRESSURE: 84 MMHG | BODY MASS INDEX: 28.55 KG/M2 | TEMPERATURE: 98.3 F | HEIGHT: 73 IN | WEIGHT: 215.4 LBS | HEART RATE: 68 BPM

## 2023-05-01 DIAGNOSIS — N40.0 BENIGN PROSTATIC HYPERPLASIA WITHOUT LOWER URINARY TRACT SYMPTOMS: ICD-10-CM

## 2023-05-01 DIAGNOSIS — Z13.83 SCREENING FOR CARDIOVASCULAR, RESPIRATORY, AND GENITOURINARY DISEASES: ICD-10-CM

## 2023-05-01 DIAGNOSIS — J18.9 PNEUMONIA OF RIGHT LOWER LOBE DUE TO INFECTIOUS ORGANISM: ICD-10-CM

## 2023-05-01 DIAGNOSIS — Z12.11 COLON CANCER SCREENING: ICD-10-CM

## 2023-05-01 DIAGNOSIS — Z13.89 SCREENING FOR CARDIOVASCULAR, RESPIRATORY, AND GENITOURINARY DISEASES: ICD-10-CM

## 2023-05-01 DIAGNOSIS — Z13.1 SCREENING FOR DIABETES MELLITUS (DM): ICD-10-CM

## 2023-05-01 DIAGNOSIS — R73.03 PREDIABETES: ICD-10-CM

## 2023-05-01 DIAGNOSIS — Z12.5 PROSTATE CANCER SCREENING: ICD-10-CM

## 2023-05-01 DIAGNOSIS — Z00.00 MEDICARE ANNUAL WELLNESS VISIT, SUBSEQUENT: Primary | ICD-10-CM

## 2023-05-01 DIAGNOSIS — Z13.6 SCREENING FOR CARDIOVASCULAR, RESPIRATORY, AND GENITOURINARY DISEASES: ICD-10-CM

## 2023-05-01 DIAGNOSIS — J31.0 CHRONIC RHINITIS: ICD-10-CM

## 2023-05-01 NOTE — PATIENT INSTRUCTIONS
SHINGRIX is the new, more effective vaccine for Shingles  It is more than 90% effective  You should check with your local pharmacy and insurance company for availability and coverage  It is a 2 shot series, with the second shot given between 2-6 months after the first, and is approved for ages 48 and up  Medicare Preventive Visit Patient Instructions  Thank you for completing your Welcome to Medicare Visit or Medicare Annual Wellness Visit today  Your next wellness visit will be due in one year (5/1/2024)  The screening/preventive services that you may require over the next 5-10 years are detailed below  Some tests may not apply to you based off risk factors and/or age  Screening tests ordered at today's visit but not completed yet may show as past due  Also, please note that scanned in results may not display below  Preventive Screenings:  Service Recommendations Previous Testing/Comments   Colorectal Cancer Screening  · Colonoscopy    · Fecal Occult Blood Test (FOBT)/Fecal Immunochemical Test (FIT)  · Fecal DNA/Cologuard Test  · Flexible Sigmoidoscopy Age: 39-70 years old   Colonoscopy: every 10 years (May be performed more frequently if at higher risk)  OR  FOBT/FIT: every 1 year  OR  Cologuard: every 3 years  OR  Sigmoidoscopy: every 5 years  Screening may be recommended earlier than age 39 if at higher risk for colorectal cancer  Also, an individualized decision between you and your healthcare provider will decide whether screening between the ages of 74-80 would be appropriate   Colonoscopy: 10/30/2006  FOBT/FIT: Not on file  Cologuard: Not on file  Sigmoidoscopy: Not on file          Prostate Cancer Screening Individualized decision between patient and health care provider in men between ages of 53-78   Medicare will cover every 12 months beginning on the day after your 50th birthday PSA: 0 3 ng/mL           Hepatitis C Screening Once for adults born between 1945 and 1965  More frequently in patients at high risk for Hepatitis C Hep C Antibody: Not on file        Diabetes Screening 1-2 times per year if you're at risk for diabetes or have pre-diabetes Fasting glucose: No results in last 5 years (No results in last 5 years)  A1C: 6 1 % (11/10/2021)      Cholesterol Screening Once every 5 years if you don't have a lipid disorder  May order more often based on risk factors  Lipid panel: 02/17/2023         Other Preventive Screenings Covered by Medicare:  1  Abdominal Aortic Aneurysm (AAA) Screening: covered once if your at risk  You're considered to be at risk if you have a family history of AAA or a male between the age of 73-68 who smoking at least 100 cigarettes in your lifetime  2  Lung Cancer Screening: covers low dose CT scan once per year if you meet all of the following conditions: (1) Age 50-69; (2) No signs or symptoms of lung cancer; (3) Current smoker or have quit smoking within the last 15 years; (4) You have a tobacco smoking history of at least 20 pack years (packs per day x number of years you smoked); (5) You get a written order from a healthcare provider  3  Glaucoma Screening: covered annually if you're considered high risk: (1) You have diabetes OR (2) Family history of glaucoma OR (3)  aged 48 and older OR (3)  American aged 72 and older  3  Osteoporosis Screening: covered every 2 years if you meet one of the following conditions: (1) Have a vertebral abnormality; (2) On glucocorticoid therapy for more than 3 months; (3) Have primary hyperparathyroidism; (4) On osteoporosis medications and need to assess response to drug therapy  5  HIV Screening: covered annually if you're between the age of 12-76  Also covered annually if you are younger than 13 and older than 72 with risk factors for HIV infection  For pregnant patients, it is covered up to 3 times per pregnancy      Immunizations:  Immunization Recommendations   Influenza Vaccine Annual influenza vaccination during flu season is recommended for all persons aged >= 6 months who do not have contraindications   Pneumococcal Vaccine   * Pneumococcal conjugate vaccine = PCV13 (Prevnar 13), PCV15 (Vaxneuvance), PCV20 (Prevnar 20)  * Pneumococcal polysaccharide vaccine = PPSV23 (Pneumovax) Adults 25-60 years old: 1-3 doses may be recommended based on certain risk factors  Adults 72 years old: 1-2 doses may be recommended based off what pneumonia vaccine you previously received   Hepatitis B Vaccine 3 dose series if at intermediate or high risk (ex: diabetes, end stage renal disease, liver disease)   Tetanus (Td) Vaccine - COST NOT COVERED BY MEDICARE PART B Following completion of primary series, a booster dose should be given every 10 years to maintain immunity against tetanus  Td may also be given as tetanus wound prophylaxis  Tdap Vaccine - COST NOT COVERED BY MEDICARE PART B Recommended at least once for all adults  For pregnant patients, recommended with each pregnancy  Shingles Vaccine (Shingrix) - COST NOT COVERED BY MEDICARE PART B  2 shot series recommended in those aged 48 and above     Health Maintenance Due:      Topic Date Due    Hepatitis C Screening  Never done    Colorectal Cancer Screening  10/30/2016     Immunizations Due:      Topic Date Due    Pneumococcal Vaccine: 65+ Years (1 - PCV) Never done    COVID-19 Vaccine (3 - Booster for Luis Peter series) 02/21/2022     Advance Directives   What are advance directives? Advance directives are legal documents that state your wishes and plans for medical care  These plans are made ahead of time in case you lose your ability to make decisions for yourself  Advance directives can apply to any medical decision, such as the treatments you want, and if you want to donate organs  What are the types of advance directives? There are many types of advance directives, and each state has rules about how to use them   You may choose a combination of any of the following:  · Living will: This is a written record of the treatment you want  You can also choose which treatments you do not want, which to limit, and which to stop at a certain time  This includes surgery, medicine, IV fluid, and tube feedings  · Durable power of  for healthcare [facility]): This is a written record that states who you want to make healthcare choices for you when you are unable to make them for yourself  This person, called a proxy, is usually a family member or a friend  You may choose more than 1 proxy  · Do not resuscitate (DNR) order:  A DNR order is used in case your heart stops beating or you stop breathing  It is a request not to have certain forms of treatment, such as CPR  A DNR order may be included in other types of advance directives  · Medical directive: This covers the care that you want if you are in a coma, near death, or unable to make decisions for yourself  You can list the treatments you want for each condition  Treatment may include pain medicine, surgery, blood transfusions, dialysis, IV or tube feedings, and a ventilator (breathing machine)  · Values history: This document has questions about your views, beliefs, and how you feel and think about life  This information can help others choose the care that you would choose  Why are advance directives important? An advance directive helps you control your care  Although spoken wishes may be used, it is better to have your wishes written down  Spoken wishes can be misunderstood, or not followed  Treatments may be given even if you do not want them  An advance directive may make it easier for your family to make difficult choices about your care  Weight Management   Why it is important to manage your weight:  Being overweight increases your risk of health conditions such as heart disease, high blood pressure, type 2 diabetes, and certain types of cancer   It can also increase your risk for osteoarthritis, sleep apnea, and other respiratory problems  Aim for a slow, steady weight loss  Even a small amount of weight loss can lower your risk of health problems  How to lose weight safely:  A safe and healthy way to lose weight is to eat fewer calories and get regular exercise  You can lose up about 1 pound a week by decreasing the number of calories you eat by 500 calories each day  Healthy meal plan for weight management:  A healthy meal plan includes a variety of foods, contains fewer calories, and helps you stay healthy  A healthy meal plan includes the following:  · Eat whole-grain foods more often  A healthy meal plan should contain fiber  Fiber is the part of grains, fruits, and vegetables that is not broken down by your body  Whole-grain foods are healthy and provide extra fiber in your diet  Some examples of whole-grain foods are whole-wheat breads and pastas, oatmeal, brown rice, and bulgur  · Eat a variety of vegetables every day  Include dark, leafy greens such as spinach, kale, magalis greens, and mustard greens  Eat yellow and orange vegetables such as carrots, sweet potatoes, and winter squash  · Eat a variety of fruits every day  Choose fresh or canned fruit (canned in its own juice or light syrup) instead of juice  Fruit juice has very little or no fiber  · Eat low-fat dairy foods  Drink fat-free (skim) milk or 1% milk  Eat fat-free yogurt and low-fat cottage cheese  Try low-fat cheeses such as mozzarella and other reduced-fat cheeses  · Choose meat and other protein foods that are low in fat  Choose beans or other legumes such as split peas or lentils  Choose fish, skinless poultry (chicken or turkey), or lean cuts of red meat (beef or pork)  Before you cook meat or poultry, cut off any visible fat  · Use less fat and oil  Try baking foods instead of frying them  Add less fat, such as margarine, sour cream, regular salad dressing and mayonnaise to foods  Eat fewer high-fat foods   Some examples of high-fat foods include french fries, doughnuts, ice cream, and cakes  · Eat fewer sweets  Limit foods and drinks that are high in sugar  This includes candy, cookies, regular soda, and sweetened drinks  Exercise:  Exercise at least 30 minutes per day on most days of the week  Some examples of exercise include walking, biking, dancing, and swimming  You can also fit in more physical activity by taking the stairs instead of the elevator or parking farther away from stores  Ask your healthcare provider about the best exercise plan for you  © Copyright [employer] 2018 Information is for End User's use only and may not be sold, redistributed or otherwise used for commercial purposes   All illustrations and images included in CareNotes® are the copyrighted property of [employer]  or [address] [place], [place] [address]jose ray

## 2023-05-01 NOTE — PROGRESS NOTES
Assessment/Plan:    No problem-specific Assessment & Plan notes found for this encounter  RLL pneumonia  Advised f/u cxr in 4w to be sure pneumonia clears otherwise further w/u with CT, has hx of melanoma    bph stable on flomax    Rhinitis unchanged    Finger #5 chronic deformity , offer ortho hand specialist for options as it gets in the way     Diagnoses and all orders for this visit:    Medicare annual wellness visit, subsequent    Colon cancer screening    Benign prostatic hyperplasia without lower urinary tract symptoms    Chronic rhinitis    Pneumonia of right lower lobe due to infectious organism  -     XR chest pa & lateral; Future    Screening for cardiovascular, respiratory, and genitourinary diseases  -     Lipid Panel with Direct LDL reflex; Future    Screening for diabetes mellitus (DM)    Prostate cancer screening  -     PSA, Total Screen; Future    Prediabetes  -     Comprehensive metabolic panel; Future  -     Hemoglobin A1C; Future    Other orders  -     Discontinue: albuterol (PROVENTIL HFA,VENTOLIN HFA) 90 mcg/act inhaler; Inhale 2 puffs every 6 (six) hours as needed (Patient not taking: Reported on 5/1/2023)  -     Discontinue: albuterol (2 5 mg/3 mL) 0 083 % nebulizer solution; Inhale 2 5 mg every 6 (six) hours as needed (Patient not taking: Reported on 5/1/2023)  -     Discontinue: azithromycin (ZITHROMAX) 250 mg tablet; Take 2 tablets by mouth on day one; then one tablet daily for 4 days  (Patient not taking: Reported on 5/1/2023)  -     Discontinue: benzonatate (TESSALON PERLES) 100 mg capsule; Take 100 mg by mouth Three times daily as needed  -     Discontinue: doxycycline (ADOXA) 100 MG tablet; Take 100 mg by mouth 2 (two) times a day (Patient not taking: Reported on 5/1/2023)  -     predniSONE 20 mg tablet; Take 60 mg by mouth daily        mucinex DM bid for 10d    Return in about 10 months (around 2/22/2024) for Recheck  Subjective:      Patient ID: Steph Bahena is a 72 y o  "male     Chief Complaint   Patient presents with    Follow-up     3 month f/u nm lpn  Medicare Wellness Visit       HPI  RLL pneumonia on cxr 4/26/23  Did have 1w of uri sx  Superglue exposure? Flu and covid neg in ER    Took abx and steroids, doxycycline  Did not need nebulizer    Prediabetes on last labs    The following portions of the patient's history were reviewed and updated as appropriate: allergies, current medications, past family history, past medical history, past social history, past surgical history and problem list     Review of Systems   Respiratory: Negative for shortness of breath and wheezing  Current Outpatient Medications   Medication Sig Dispense Refill    fluticasone (FLONASE) 50 mcg/act nasal spray 2 sprays into each nostril daily as needed for allergies 16 g 5    predniSONE 20 mg tablet Take 60 mg by mouth daily      tamsulosin (FLOMAX) 0 4 mg take 1 capsule by mouth once daily WITH DINNER 90 capsule 1     No current facility-administered medications for this visit  Objective:    /84   Pulse 68   Temp 98 3 °F (36 8 °C)   Resp 18   Ht 6' 0 5\" (1 842 m)   Wt 97 7 kg (215 lb 6 4 oz)   BMI 28 81 kg/m²        Physical Exam  Vitals and nursing note reviewed  Constitutional:       General: He is not in acute distress  Appearance: He is well-developed  He is not ill-appearing  HENT:      Head: Normocephalic  Eyes:      General: No scleral icterus  Conjunctiva/sclera: Conjunctivae normal    Cardiovascular:      Rate and Rhythm: Normal rate and regular rhythm  Pulmonary:      Effort: Pulmonary effort is normal  No respiratory distress  Breath sounds: Normal breath sounds  No stridor  No wheezing, rhonchi or rales  Abdominal:      Palpations: Abdomen is soft  Musculoskeletal:         General: No deformity  Cervical back: Neck supple  Skin:     General: Skin is warm and dry  Coloration: Skin is not jaundiced or pale     Neurological: " Mental Status: He is alert  Motor: No weakness  Gait: Gait normal    Psychiatric:         Mood and Affect: Mood normal          Behavior: Behavior normal          Thought Content: Thought content normal        BMI Counseling: Body mass index is 28 81 kg/m²  The BMI is above normal  Nutrition recommendations include decreasing portion sizes and moderation in carbohydrate intake  Exercise recommendations include exercising 3-5 times per week  No pharmacotherapy was ordered  Rationale for BMI follow-up plan is due to patient being overweight or obese                Kevin Holly DO

## 2023-05-02 NOTE — PROGRESS NOTES
Assessment and Plan:     Problem List Items Addressed This Visit        Active Problems    Screening for cardiovascular, respiratory, and genitourinary diseases    Relevant Orders    Lipid Panel with Direct LDL reflex    Screening for diabetes mellitus (DM)    Prostate cancer screening    Relevant Orders    PSA, Total Screen    Colon cancer screening    Benign prostatic hyperplasia without lower urinary tract symptoms    Chronic rhinitis    Pneumonia of right lower lobe due to infectious organism    Relevant Orders    XR chest pa & lateral    Medicare annual wellness visit, subsequent - Primary    Prediabetes    Relevant Orders    Comprehensive metabolic panel    Hemoglobin A1C        Preventive health issues were discussed with patient, and age appropriate screening tests were ordered as noted in patient's After Visit Summary  Personalized health advice and appropriate referrals for health education or preventive services given if needed, as noted in patient's After Visit Summary       History of Present Illness:     Patient presents for a Medicare Wellness Visit    HPI   Patient Care Team:  Griselda Jett DO as PCP - MD Royal Harris MD (Gastroenterology)     Review of Systems:     Review of Systems     Problem List:     Patient Active Problem List   Diagnosis    Chronic rhinitis    Benign colon polyp    Colon, diverticulosis    Benign prostatic hyperplasia without lower urinary tract symptoms    Low HDL (under 40)    History of malignant melanoma of skin    Renal colic    Nerve entrapment syndrome, inguinal, right    Screening for cardiovascular, respiratory, and genitourinary diseases    Screening for diabetes mellitus (DM)    Prostate cancer screening    Colon cancer screening    S/P TURP    Former tobacco use    Sacral nerve stimulator present    Plantar fasciitis of left foot    PHN (postherpetic neuralgia)    Trigeminal herpes zoster    Pneumonia of right lower lobe due to infectious organism    Medicare annual wellness visit, subsequent    Prediabetes      Past Medical and Surgical History:     Past Medical History:   Diagnosis Date    Cancer [facility])     melanoma of skin of left shoulder  basal  cell of face20    Colon polyp     Dental abscess     started  4 weeks ago  on RX    Enlarged prostate     Urinary bladder disorder      Past Surgical History:   Procedure Laterality Date    CATARACT EXTRACTION      COLONOSCOPY      HERNIA REPAIR      MASS EXCISION      melanoma of left shoulder     IL TRURL ELECTROSURG RESCJ PROSTATE BLEED COMPLETE N/A 10/8/2019    Procedure: CYSTOSCOPY, TRANSURETHRAL RESECTION OF PROSTATE (TURP); Surgeon: Lisa Bird MD;  Location: [address] [place] [address];  Service: Urology    IL Shon Cover RESIDUAL/REGROWTH OBSTR PRSTATE TISS N/A 2020    Procedure: CYSTOSCOPY, TRANSURETHRAL RESECTION OF RESIDUAL OR REGROWTH OF OBSTRUCTIVE PROSTATE TISSUE;  Surgeon: Lisa Bird MD;  Location: [address] [place] [address];  Service: Urology    IL XCAPSL CTRC RMVL INSJ IO LENS PROSTH W/O ECP Right 6/15/2020    Procedure: EXTRACTION EXTRACAPSULAR CATARACT PHACO INTRAOCULAR LENS (IOL);   Surgeon: Dwayne De Los Santos MD;  Location[facility] MAIN OR;  Service: Ophthalmology    TONSILLECTOMY        Family History:     Family History   Problem Relation Age of Onset    Diabetes Mother     Heart failure Father     Hypertension Father     No Known Problems Sister     Diabetes Brother       Social History:     Social History     Socioeconomic History    Marital status: Single     Spouse name: None    Number of children: None    Years of education: None    Highest education level: None   Occupational History    None   Tobacco Use    Smoking status: Former     Packs/day: 1 00     Types: Cigarettes     Quit date: 2016     Years since quittin 7    Smokeless tobacco: Never   Vaping Use    Vaping Use: Never used   Substance and Sexual Activity  Alcohol use: Never    Drug use: Never    Sexual activity: Not Currently   Other Topics Concern    None   Social History Narrative    None     Social Determinants of Health     Financial Resource Strain: Low Risk     Difficulty of Paying Living Expenses: Not hard at all   Food Insecurity: Not on file   Transportation Needs: No Transportation Needs    Lack of Transportation (Medical): No    Lack of Transportation (Non-Medical): No   Physical Activity: Not on file   Stress: Not on file   Social Connections: Not on file   Intimate Partner Violence: Not on file   Housing Stability: Not on file      Medications and Allergies:     Current Outpatient Medications   Medication Sig Dispense Refill    fluticasone (FLONASE) 50 mcg/act nasal spray 2 sprays into each nostril daily as needed for allergies 16 g 5    predniSONE 20 mg tablet Take 60 mg by mouth daily      tamsulosin (FLOMAX) 0 4 mg take 1 capsule by mouth once daily WITH DINNER 90 capsule 1     No current facility-administered medications for this visit  Allergies   Allergen Reactions    Aspirin GI Intolerance     Other reaction(s): GI upset  Reaction Date: [id number]; Other reaction(s): gi complications  Reaction Date: [id number];       Immunizations:     Immunization History   Administered Date(s) Administered    COVID-19 PFIZER VACCINE 0 3 ML IM 12/06/2021, 12/27/2021    INFLUENZA 10/01/2003    Tdap 09/27/2010      Health Maintenance:         Topic Date Due    Hepatitis C Screening  Never done    Colorectal Cancer Screening  10/30/2016         Topic Date Due    Pneumococcal Vaccine: 65+ Years (1 - PCV) Never done    COVID-19 Vaccine (3 - Booster for Luis Peter series) 02/21/2022      Medicare Screening Tests and Risk Assessments:     Marine Noble is here for his Subsequent Wellness visit  Last Medicare Wellness visit information reviewed, patient interviewed and updates made to the record today        Health Risk Assessment:   Patient rates overall health as excellent  Patient feels that their physical health rating is much better  Patient is very satisfied with their life  Eyesight was rated as same  Hearing was rated as same  Patient feels that their emotional and mental health rating is same  Patients states they are never, rarely angry  Patient states they are never, rarely unusually tired/fatigued  Pain experienced in the last 7 days has been none  Patient states that he has experienced no weight loss or gain in last 6 months  Fall Risk Screening: In the past year, patient has experienced: no history of falling in past year      Home Safety:  Patient does not have trouble with stairs inside or outside of their home  Patient has working smoke alarms and has working carbon monoxide detector  Home safety hazards include: none  Nutrition:   Current diet is Other (please comment)  Paleo    Medications:   Patient is not currently taking any over-the-counter supplements  Patient is able to manage medications  Activities of Daily Living (ADLs)/Instrumental Activities of Daily Living (IADLs):   Walk and transfer into and out of bed and chair?: Yes  Dress and groom yourself?: Yes    Bathe or shower yourself?: Yes    Feed yourself?  Yes  Do your laundry/housekeeping?: Yes  Manage your money, pay your bills and track your expenses?: Yes  Make your own meals?: Yes    Do your own shopping?: Yes    Previous Hospitalizations:   Any hospitalizations or ED visits within the last 12 months?: No      Advance Care Planning:   Living will: No    Durable POA for healthcare: Yes    End of Life Decisions reviewed with patient: No      Cognitive Screening:   Provider or family/friend/caregiver concerned regarding cognition?: No    PREVENTIVE SCREENINGS      Cardiovascular Screening:    General: Screening Current      Diabetes Screening:     General: Screening Current      Colorectal Cancer Screening:     General: Risks and Benefits Discussed      Prostate Cancer "Screening:    General: Screening Current      Osteoporosis Screening:    General: Screening Not Indicated      Abdominal Aortic Aneurysm (AAA) Screening:    Risk factors include: age between 73-69 yo and tobacco use        General: Screening Not Indicated      Lung Cancer Screening:     General: Screening Not Indicated      Hepatitis C Screening:    General: Patient Declines and Risks and Benefits Discussed    Hep C Screening Accepted: No     Screening, Brief Intervention, and Referral to Treatment (SBIRT)    Screening  Typical number of drinks in a day: 0  Typical number of drinks in a week: 0  Interpretation: Low risk drinking behavior  Single Item Drug Screening:  How often have you used an illegal drug (including marijuana) or a prescription medication for non-medical reasons in the past year? never    Single Item Drug Screen Score: 0  Interpretation: Negative screen for possible drug use disorder    Other Counseling Topics:   Car/seat belt/driving safety and regular weightbearing exercise  No results found       Physical Exam:     /84   Pulse 68   Temp 98 3 °F (36 8 °C)   Resp 18   Ht 6' 0 5\" (1 842 m)   Wt 97 7 kg (215 lb 6 4 oz)   BMI 28 81 kg/m²     Physical Exam     Adri Higgins DO  "

## 2023-05-10 ENCOUNTER — OFFICE VISIT (OUTPATIENT)
Dept: FAMILY MEDICINE CLINIC | Facility: CLINIC | Age: 73
End: 2023-05-10

## 2023-05-10 VITALS
BODY MASS INDEX: 28.18 KG/M2 | DIASTOLIC BLOOD PRESSURE: 82 MMHG | TEMPERATURE: 98.4 F | WEIGHT: 212.6 LBS | RESPIRATION RATE: 18 BRPM | HEART RATE: 69 BPM | SYSTOLIC BLOOD PRESSURE: 130 MMHG | HEIGHT: 73 IN

## 2023-05-10 DIAGNOSIS — R05.2 SUBACUTE COUGH: ICD-10-CM

## 2023-05-10 DIAGNOSIS — J18.9 PNEUMONIA OF RIGHT LOWER LOBE DUE TO INFECTIOUS ORGANISM: Primary | ICD-10-CM

## 2023-05-10 RX ORDER — PREDNISONE 10 MG/1
TABLET ORAL
Qty: 30 TABLET | Refills: 0 | Status: SHIPPED | OUTPATIENT
Start: 2023-05-10 | End: 2023-05-22

## 2023-05-10 NOTE — PROGRESS NOTES
Assessment/Plan:    No problem-specific Assessment & Plan notes found for this encounter  Diagnoses and all orders for this visit:    Pneumonia of right lower lobe due to infectious organism  -     XR chest pa & lateral; Future  -     predniSONE 10 mg tablet; Take 4 pills/d for 3 days then 3 pills/d for 3 days, then 2 pills/d for 3 days then 1 pill/d for 3 days    Subacute cough  -     XR chest pa & lateral; Future  -     predniSONE 10 mg tablet; Take 4 pills/d for 3 days then 3 pills/d for 3 days, then 2 pills/d for 3 days then 1 pill/d for 3 days      suspect post infection cough  cxr to check for progression, could get done at [facility] to allow meaningful comparison  If worse, may need another abx course  If same, then repeat as planned for clearance  If resolved, no further xray f/u    bph stable  Steroid risks aware    Return if symptoms worsen or fail to improve  Subjective:      Patient ID: Lisa James is a 72 y o  male  Chief Complaint   Patient presents with   • Pneumonia     Still not getting better nm  lpn       HPI  Done with abx  No fevers recorded  Coughing spells still bogdan hs  Fatigued  Not sleeping well  Eating/drinking ok  paleo diet  No pleurisy  cxr was 4/26/23 at [facility]  No discolored mucus or hemoptysis    The following portions of the patient's history were reviewed and updated as appropriate: allergies, current medications, past family history, past medical history, past social history, past surgical history and problem list     Review of Systems   Constitutional: Negative for chills and fever  Respiratory: Positive for cough            Current Outpatient Medications   Medication Sig Dispense Refill   • fluticasone (FLONASE) 50 mcg/act nasal spray 2 sprays into each nostril daily as needed for allergies 16 g 5   • predniSONE 10 mg tablet Take 4 pills/d for 3 days then 3 pills/d for 3 days, then 2 pills/d for 3 days then 1 pill/d for 3 days 30 tablet 0   • tamsulosin (FLOMAX) 0 4 mg Called and spoke with patient regarding RN care management program. Patient declines interest in participating in outreach calls at this time.    "take 1 capsule by mouth once daily WITH DINNER 90 capsule 1     No current facility-administered medications for this visit  Objective:    /82   Pulse 69   Temp 98 4 °F (36 9 °C)   Resp 18   Ht 6' 0 5\" (1 842 m)   Wt 96 4 kg (212 lb 9 6 oz)   BMI 28 44 kg/m²        Physical Exam  Vitals and nursing note reviewed  Constitutional:       Appearance: He is well-developed  He is not toxic-appearing or diaphoretic  HENT:      Head: Normocephalic  Right Ear: Tympanic membrane normal       Left Ear: Tympanic membrane normal       Mouth/Throat:      Pharynx: No oropharyngeal exudate  Eyes:      General: No scleral icterus  Conjunctiva/sclera: Conjunctivae normal    Neck:      Vascular: No carotid bruit  Cardiovascular:      Rate and Rhythm: Normal rate  Heart sounds: No murmur heard  Pulmonary:      Effort: Pulmonary effort is normal  No respiratory distress  Breath sounds: No stridor  No wheezing, rhonchi or rales  Abdominal:      Palpations: Abdomen is soft  Tenderness: There is no abdominal tenderness  Musculoskeletal:         General: No deformity  Cervical back: Neck supple  Skin:     General: Skin is warm and dry  Coloration: Skin is not pale  Neurological:      Mental Status: He is alert  Motor: No weakness  Gait: Gait normal    Psychiatric:         Mood and Affect: Mood normal          Behavior: Behavior normal          Thought Content:  Thought content normal                 Gurvinder Verdugo DO    "

## 2023-05-22 ENCOUNTER — TELEPHONE (OUTPATIENT)
Dept: OTHER | Facility: HOSPITAL | Age: 73
End: 2023-05-22

## 2023-05-22 ENCOUNTER — HOSPITAL ENCOUNTER (EMERGENCY)
Facility: HOSPITAL | Age: 73
Discharge: HOME | End: 2023-05-22
Attending: EMERGENCY MEDICINE

## 2023-05-22 ENCOUNTER — APPOINTMENT (EMERGENCY)
Dept: CT IMAGING | Facility: HOSPITAL | Age: 73
End: 2023-05-22

## 2023-05-22 VITALS
WEIGHT: 205.91 LBS | HEART RATE: 78 BPM | SYSTOLIC BLOOD PRESSURE: 142 MMHG | TEMPERATURE: 97.8 F | RESPIRATION RATE: 20 BRPM | BODY MASS INDEX: 27.54 KG/M2 | OXYGEN SATURATION: 95 % | DIASTOLIC BLOOD PRESSURE: 79 MMHG

## 2023-05-22 DIAGNOSIS — R10.9 RIGHT FLANK PAIN: ICD-10-CM

## 2023-05-22 DIAGNOSIS — N13.30 HYDRONEPHROSIS OF RIGHT KIDNEY: Primary | ICD-10-CM

## 2023-05-22 LAB
ANION GAP SERPL CALCULATED.3IONS-SCNC: 5 MMOL/L (ref 4–13)
BACTERIA UR QL AUTO: NORMAL /HPF
BASOPHILS # BLD AUTO: 0.02 THOUSANDS/ÂΜL (ref 0–0.1)
BASOPHILS NFR BLD AUTO: 0 % (ref 0–1)
BILIRUB UR QL STRIP: NEGATIVE
BUN SERPL-MCNC: 21 MG/DL (ref 5–25)
CALCIUM SERPL-MCNC: 9.5 MG/DL (ref 8.4–10.2)
CHLORIDE SERPL-SCNC: 101 MMOL/L (ref 96–108)
CLARITY UR: CLEAR
CO2 SERPL-SCNC: 30 MMOL/L (ref 21–32)
COLOR UR: COLORLESS
CREAT SERPL-MCNC: 0.88 MG/DL (ref 0.6–1.3)
EOSINOPHIL # BLD AUTO: 1.03 THOUSAND/ÂΜL (ref 0–0.61)
EOSINOPHIL NFR BLD AUTO: 9 % (ref 0–6)
ERYTHROCYTE [DISTWIDTH] IN BLOOD BY AUTOMATED COUNT: 13.2 % (ref 11.6–15.1)
GFR SERPL CREATININE-BSD FRML MDRD: 86 ML/MIN/1.73SQ M
GLUCOSE SERPL-MCNC: 77 MG/DL (ref 65–140)
GLUCOSE UR STRIP-MCNC: NEGATIVE MG/DL
HCT VFR BLD AUTO: 44 % (ref 36.5–49.3)
HGB BLD-MCNC: 14.9 G/DL (ref 12–17)
HGB UR QL STRIP.AUTO: ABNORMAL
IMM GRANULOCYTES # BLD AUTO: 0.03 THOUSAND/UL (ref 0–0.2)
IMM GRANULOCYTES NFR BLD AUTO: 0 % (ref 0–2)
KETONES UR STRIP-MCNC: NEGATIVE MG/DL
LEUKOCYTE ESTERASE UR QL STRIP: NEGATIVE
LYMPHOCYTES # BLD AUTO: 1.6 THOUSANDS/ÂΜL (ref 0.6–4.47)
LYMPHOCYTES NFR BLD AUTO: 15 % (ref 14–44)
MCH RBC QN AUTO: 32.8 PG (ref 26.8–34.3)
MCHC RBC AUTO-ENTMCNC: 33.9 G/DL (ref 31.4–37.4)
MCV RBC AUTO: 97 FL (ref 82–98)
MONOCYTES # BLD AUTO: 1.22 THOUSAND/ÂΜL (ref 0.17–1.22)
MONOCYTES NFR BLD AUTO: 11 % (ref 4–12)
NEUTROPHILS # BLD AUTO: 7.06 THOUSANDS/ÂΜL (ref 1.85–7.62)
NEUTS SEG NFR BLD AUTO: 65 % (ref 43–75)
NITRITE UR QL STRIP: NEGATIVE
NON-SQ EPI CELLS URNS QL MICRO: NORMAL /HPF
NRBC BLD AUTO-RTO: 0 /100 WBCS
PH UR STRIP.AUTO: 6 [PH]
PLATELET # BLD AUTO: 229 THOUSANDS/UL (ref 149–390)
PMV BLD AUTO: 9.6 FL (ref 8.9–12.7)
POTASSIUM SERPL-SCNC: 3.6 MMOL/L (ref 3.5–5.3)
PROT UR STRIP-MCNC: NEGATIVE MG/DL
RBC # BLD AUTO: 4.54 MILLION/UL (ref 3.88–5.62)
RBC #/AREA URNS AUTO: NORMAL /HPF
SODIUM SERPL-SCNC: 136 MMOL/L (ref 135–147)
SP GR UR STRIP.AUTO: 1 (ref 1–1.03)
UROBILINOGEN UR STRIP-ACNC: <2 MG/DL
WBC # BLD AUTO: 10.96 THOUSAND/UL (ref 4.31–10.16)
WBC #/AREA URNS AUTO: NORMAL /HPF

## 2023-05-22 RX ORDER — ONDANSETRON 4 MG/1
4 TABLET, ORALLY DISINTEGRATING ORAL EVERY 6 HOURS PRN
Qty: 12 TABLET | Refills: 0 | Status: SHIPPED | OUTPATIENT
Start: 2023-05-22

## 2023-05-22 RX ORDER — OXYCODONE HYDROCHLORIDE AND ACETAMINOPHEN 5; 325 MG/1; MG/1
1 TABLET ORAL EVERY 6 HOURS PRN
Qty: 12 TABLET | Refills: 0 | Status: SHIPPED | OUTPATIENT
Start: 2023-05-22 | End: 2023-05-25

## 2023-05-22 RX ADMIN — SODIUM CHLORIDE 1000 ML: 0.9 INJECTION, SOLUTION INTRAVENOUS at 13:06

## 2023-05-22 NOTE — ED PROVIDER NOTES
"History  Chief Complaint   Patient presents with   • Flank Pain     Patient co right sided flank pain that started yesterday  Did vomit from the pain  Patient reports \"I don't feel like I am emptying out all my urine  \"      72 y o  male presents to the ED with chief complaint of flank pain  Onset of symptoms reported as 1 day ago  Location is reported as the right flank  Quality is reported as sudden onset sharp pain  Severity is reported as severe  Associated symptoms  Positive for nausea  Positive for vomiting x1   Denies diarrhea  Denies constipation  Denies fever  denies urinary retention  denies hematuria  Modifiers: movement does not seem to influence pain  Context: denies recent falls, injury or trauma to the area  States he has episode of flank pain that started yesterday -sudden, sharp, severe, but now resolved  Associated with 1 episode of nausea/vomiting  Denies hematuria  History of urinary retention in 2019 s/p TURP for BPH  On tamsulosin  Recently started [employer] and thinks this could be contributing to symptoms  Concerned about possible kidney stone    Past Medical History:  2011: Cancer [facility])      Comment:  melanoma of skin of left shoulder  basal  cell of                face7/2/20  No date: Colon polyp  No date: Dental abscess      Comment:  started  4 weeks ago  on RX  No date: Enlarged prostate  No date: Urinary bladder disorder  Past Surgical History:  No date: CATARACT EXTRACTION  No date: COLONOSCOPY  No date: HERNIA REPAIR  No date: MASS EXCISION      Comment:  melanoma of left shoulder   10/8/2019: [place] TRURL ELECTROSURG RESCJ PROSTATE BLEED COMPLETE; N/A      Comment:  Procedure: CYSTOSCOPY, TRANSURETHRAL RESECTION OF                PROSTATE (TURP);   Surgeon: Severa Colace, MD;                 Location: [address] [place] [address];  Service: Urology  7/14/2020: Davy Goodell RESIDUAL/REGROWTH OBSTR PRSTATE TISS; N/A      Comment:  Procedure: CYSTOSCOPY, TRANSURETHRAL RESECTION OF            " RESIDUAL OR REGROWTH OF OBSTRUCTIVE PROSTATE TISSUE;                 Surgeon: Ciro Yee MD;  Location: [address] [place] [address];                 Service: Urology  6/15/2020: AL XCAPSL CTRC RMVL INSJ IO LENS PROSTH W/O ECP; Right      Comment:  Procedure: EXTRACTION EXTRACAPSULAR CATARACT PHACO                INTRAOCULAR LENS (IOL); Surgeon: Roberto Rubio MD;                 Location: [facility];  Service: Ophthalmology  No date: TONSILLECTOMY  Social History reviewed: reformed smoker, quite 2016  Denies ETOH or drug use        History provided by:  Patient   used: No        Prior to Admission Medications   Prescriptions Last Dose Informant Patient Reported? Taking?   fluticasone (FLONASE) 50 mcg/act nasal spray   No No   Si sprays into each nostril daily as needed for allergies   predniSONE 10 mg tablet   No No   Sig: Take 4 pills/d for 3 days then 3 pills/d for 3 days, then 2 pills/d for 3 days then 1 pill/d for 3 days   tamsulosin (FLOMAX) 0 4 mg   No No   Sig: take 1 capsule by mouth once daily WITH DINNER      Facility-Administered Medications: None       Past Medical History:   Diagnosis Date   • Cancer ([facility] [address] )     melanoma of skin of left shoulder  basal  cell of face20   • Colon polyp    • Dental abscess     started  4 weeks ago  on RX   • Enlarged prostate    • Urinary bladder disorder        Past Surgical History:   Procedure Laterality Date   • CATARACT EXTRACTION     • COLONOSCOPY     • HERNIA REPAIR     • MASS EXCISION      melanoma of left shoulder    • AL TRURL ELECTROSURG RESCJ PROSTATE BLEED COMPLETE N/A 10/8/2019    Procedure: CYSTOSCOPY, TRANSURETHRAL RESECTION OF PROSTATE (TURP);   Surgeon: Ciro Yee MD;  Location: [address] [place] [address];  Service: Urology   • AL TRURL [address] RESIDUAL/REGROWTH OBSTR PRSTATE TISS N/A 2020    Procedure: CYSTOSCOPY, TRANSURETHRAL RESECTION OF RESIDUAL OR REGROWTH OF OBSTRUCTIVE PROSTATE TISSUE;  Surgeon: Ciro Yee MD; Location: [place] MAIN OR;  Service: Urology   • [place] XCAPSL CTRC RMVL INSJ IO LENS PROSTH W/O ECP Right 6/15/2020    Procedure: EXTRACTION EXTRACAPSULAR CATARACT PHACO INTRAOCULAR LENS (IOL); Surgeon: Yue Meyers MD;  Location: [facility] MAIN OR;  Service: Ophthalmology   • TONSILLECTOMY         Family History   Problem Relation Age of Onset   • Diabetes Mother    • Heart failure Father    • Hypertension Father    • No Known Problems Sister    • Diabetes Brother      I have reviewed and agree with the history as documented  E-Cigarette/Vaping   • E-Cigarette Use Never User      E-Cigarette/Vaping Substances   • Nicotine No    • THC No    • CBD No    • Flavoring No    • Other No    • Unknown No      Social History     Tobacco Use   • Smoking status: Former     Packs/day:      Types: Cigarettes     Quit date: 2016     Years since quittin 8   • Smokeless tobacco: Never   Vaping Use   • Vaping Use: Never used   Substance Use Topics   • Alcohol use: Never   • Drug use: Never       Review of Systems   Constitutional: Negative for fever  Respiratory: Negative for cough, chest tightness, shortness of breath and wheezing  Cardiovascular: Negative for chest pain  Gastrointestinal: Positive for nausea and vomiting  Genitourinary: Positive for flank pain  Negative for decreased urine volume, difficulty urinating, dysuria, hematuria and urgency  All other systems reviewed and are negative  Physical Exam  Physical Exam  Vitals and nursing note reviewed  Constitutional:       General: He is not in acute distress  Appearance: Normal appearance  Comments: /80 (BP Location: Left arm)   Pulse 74   Temp 97 8 °F (36 6 °C) (Temporal)   Resp 18   Wt 93 4 kg (205 lb 14 6 oz)   SpO2 96%   BMI 27 54 kg/m²      HENT:      Head: Normocephalic and atraumatic  Right Ear: External ear normal       Left Ear: External ear normal       Nose: Nose normal    Eyes:      General: No scleral icterus  Right eye: No discharge  Left eye: No discharge  Cardiovascular:      Rate and Rhythm: Normal rate  Pulses: Normal pulses  Pulmonary:      Effort: Pulmonary effort is normal       Breath sounds: Normal breath sounds  Abdominal:      Palpations: There is no mass  Tenderness: There is no abdominal tenderness  Hernia: No hernia is present  Musculoskeletal:         General: No tenderness, deformity or signs of injury  Normal range of motion  Cervical back: Normal range of motion and neck supple  Skin:     General: Skin is dry  Coloration: Skin is not jaundiced  Findings: No erythema or rash  Neurological:      General: No focal deficit present  Mental Status: He is alert and oriented to person, place, and time  Mental status is at baseline  Sensory: No sensory deficit  Motor: No weakness  Gait: Gait normal    Psychiatric:         Mood and Affect: Mood normal          Behavior: Behavior normal          Thought Content:  Thought content normal          Vital Signs  ED Triage Vitals [05/22/23 1159]   Temperature Pulse Respirations Blood Pressure SpO2   97 8 °F (36 6 °C) 74 18 110/80 96 %      Temp Source Heart Rate Source Patient Position - Orthostatic VS BP Location FiO2 (%)   Temporal Monitor Sitting Left arm --      Pain Score       --           Vitals:    05/22/23 1159 05/22/23 1504 05/22/23 1510   BP: 110/80 142/79    Pulse: 74 79 78   Patient Position - Orthostatic VS: Sitting Sitting          Visual Acuity      ED Medications  Medications   sodium chloride 0 9 % bolus 1,000 mL (0 mL Intravenous Stopped 5/22/23 1510)       Diagnostic Studies  Results Reviewed     Procedure Component Value Units Date/Time    Basic metabolic panel [[id number]] Collected: 05/22/23 1259    Lab Status: Final result Specimen: Blood from Arm, Left Updated: 05/22/23 1335     Sodium 136 mmol/L      Potassium 3 6 mmol/L      Chloride 101 mmol/L      CO2 30 mmol/L ANION GAP 5 mmol/L      BUN 21 mg/dL      Creatinine 0 88 mg/dL      Glucose 77 mg/dL      Calcium 9 5 mg/dL      eGFR 86 ml/min/1 73sq m     Narrative:      National Kidney Disease Foundation guidelines for Chronic Kidney Disease (CKD):   •  Stage 1 with normal or high GFR (GFR > 90 mL/min/1 73 square meters)  •  Stage 2 Mild CKD (GFR = 60-89 mL/min/1 73 square meters)  •  Stage 3A Moderate CKD (GFR = 45-59 mL/min/1 73 square meters)  •  Stage 3B Moderate CKD (GFR = 30-44 mL/min/1 73 square meters)  •  Stage 4 Severe CKD (GFR = 15-29 mL/min/1 73 square meters)  •  Stage 5 End Stage CKD (GFR <15 mL/min/1 73 square meters)  Note: GFR calculation is accurate only with a steady state creatinine    Urine Microscopic [[id number]]  (Normal) Collected: 05/22/23 1259    Lab Status: Final result Specimen: Urine, Clean Catch Updated: 05/22/23 1315     RBC, UA 1-2 /hpf      WBC, UA 1-2 /hpf      Epithelial Cells None Seen /hpf      Bacteria, UA None Seen /hpf     UA w Reflex to Microscopic w Reflex to Culture [[id number]]  (Abnormal) Collected: 05/22/23 1259    Lab Status: Final result Specimen: Urine, Clean Catch Updated: 05/22/23 1314     Color, UA Colorless     Clarity, UA Clear     Specific [employer], UA 1 005     pH, UA 6 0     Leukocytes, UA Negative     Nitrite, UA Negative     Protein, UA Negative mg/dl      Glucose, UA Negative mg/dl      Ketones, UA Negative mg/dl      Urobilinogen, UA <2 0 mg/dl      Bilirubin, UA Negative     Occult Blood, UA Large    CBC and differential [[id number]]  (Abnormal) Collected: 05/22/23 1259    Lab Status: Final result Specimen: Blood from Arm, Left Updated: 05/22/23 1310     WBC 10 96 Thousand/uL      RBC 4 54 Million/uL      Hemoglobin 14 9 g/dL      Hematocrit 44 0 %      MCV 97 fL      MCH 32 8 pg      MCHC 33 9 g/dL      RDW 13 2 %      MPV 9 6 fL      Platelets 552 Thousands/uL      nRBC 0 /100 WBCs      Neutrophils Relative 65 %      Immat GRANS % 0 %      Lymphocytes Relative 15 % Monocytes Relative 11 %      Eosinophils Relative 9 %      Basophils Relative 0 %      Neutrophils Absolute 7 06 Thousands/µL      Immature Grans Absolute 0 03 Thousand/uL      Lymphocytes Absolute 1 60 Thousands/µL      Monocytes Absolute 1 22 Thousand/µL      Eosinophils Absolute 1 03 Thousand/µL      Basophils Absolute 0 02 Thousands/µL                  CT renal stone study abdomen pelvis without contrast   Final Result by Aren Eller MD (05/22 1331)      1  Right-sided hydroureteronephrosis to the level of the bladder  No obstructing stone is visualized  Differential includes recently passed stone though there are no stones in the bladder, and soft tissue obstruction such as blood clot or tumor  Note    that there was hydronephrosis on the previous study secondary to bladder distention though bilateral  the bladder on the current study appears trabeculated though is not significantly distended  2   Multiple indeterminate hypodense lesions throughout the liver and kidneys, most likely small cysts though increased in size  3   Nonobstructing left renal calculi  The study was marked in [facility] for immediate notification  Workstation performed: [id number]         CT renal protocol    (Results Pending)              Procedures  Procedures         ED Course                               SBIRT 20yo+    Flowsheet Row Most Recent Value   Initial Alcohol Screen: US AUDIT-C     1  How often do you have a drink containing alcohol? 0 Filed at: 05/22/2023 1500   2  How many drinks containing alcohol do you have on a typical day you are drinking? 1 Filed at: 05/22/2023 1500   3b  FEMALE Any Age, or MALE 65+: How often do you have 4 or more drinks on one occassion? 0 Filed at: 05/22/2023 1500   Audit-C Score 1 Filed at: 05/22/2023 1500   HELDER: How many times in the past year have you    Used an illegal drug or used a prescription medication for non-medical reasons?  Never Filed at: 05/22/2023 1500                    Medical Decision Making  ED Coarse: 72year old male with 1 day atruamtic right flank pain associated with 1 episode nausea/vomiting  Concerned about possible kidney stone causing symptoms  Currently pain free and without nausea or vomiting on initial evaluation in ED  DDX:  ddx includes but is not limited to pyelonephritis, kidney stone, intestinal sources including diverticulitis or appendicitis, biliary colic, cholecystitis, mesenteric ischemia, shingles, urinary tract infection, musculoskeletal back pain,     Initial ED Plan: UA for uti, hematuria or pyelonephritis,  Bmp for renal function CBC for WBC, CT a/p to evaluation for source of flank pain including kidney stone  Offered analgesics/antiemetics but patient declined - will monitor for recurrence of symptoms  MDM:  I have reviewed the patient's vital signs, nursing notes, and other relevant ancillary testing/information  I have had a detailed discussion with the patient regarding the historical points, examination findings, and any diagnostic results    Results:  Reviewed at bedside: UA positive hematuria  Negative nitrites or blood  No renal failure on BMP  CBC mild leukocytosis WBC 10 9 nonspecific  No anemia  CT a/p:  Right-sided hydroureteronephrosis to the level of the bladder  No obstructing stone is visualized  Differential includes recently passed stone though there are no stones in the bladder, and soft tissue obstruction such as blood clot or tumor  Note that there was hydronephrosis on the previous study secondary to bladder distention though bilateral  the bladder on the current study appears trabeculated though is not significantly distended  Case discussed with urology - patient well appearing on exam and during ED coarse  No return pain or vomiting  No urinary obstruction  Bladder not distended on CT    Discussed concerns for R sided hydronephrosis with patient-  No ureteral stone or recently passed stone seen on CT  Concern for possible soft tissue obstruction/cancer as source for symptoms discussed with patient  Case discussed with urology by mazin cuellot: (adrianne) recommended repeat CT in  7 Days and will have office staff call patient for appointment  Patient asymptomatic on repeat evaluation, stable for discharge and outpatient treatment     ED Final Assessment 1) right hydronephrosis  2) right flank pain secondary to #1  3) hematuria on UA       -encourage fluids  -monitor for development of blood clots in urine/urinary retention  -percocet for pain  Standard narcotic precautions given  -activity as tolerated  Zofran for nausea/vomiting  -f/u PCP in 3-5 days for further evaluation of symptoms  -f/u urology in 1 week for further evaluation of symptoms  Urology office to call patient to schedule appointment  -continue tamsulosin as previously directed  -reviewed reasons to return to ED, including but not limited to fevers, chills, vomiting, urinary retention, clots in urine, worsening flank pain or any other worsening or worrisome symptom  I discussed diagnosis and treatment plan with patient at bedside  Extended discussion with patient regarding the diagnosis, pathophysiology, expectant coarse and treatment plan  Instructed to follow up with pcp and recommended specialist in 3-5 days and 1 week respectively  Reviewed reasons to return to ed  Patient verbalized understanding of diagnosis and agreement with discharge plan of care as well as understanding of reasons to return to ed  Amount and/or Complexity of Data Reviewed  Labs: ordered  Radiology: ordered  Risk  Prescription drug management            Disposition  Final diagnoses:   Hydronephrosis of right kidney   Right flank pain     Time reflects when diagnosis was documented in both MDM as applicable and the Disposition within this note     Time User Action Codes Description Comment    5/22/2023  2:11 PM Helenamira Escamillalily Add [N13 30] Hydronephrosis of right kidney     5/22/2023  2:12 PM Nani Cooley Add [R10 9] Right flank pain       ED Disposition     ED Disposition   Discharge    Condition   Stable    Date/Time   Mon May 22, 2023  2:51 PM    [address] discharge to home/self care  Follow-up Information     Follow up With Specialties Details Why Contact Info Additional Moises Grant 13, DO Family Medicine Call in 3 days for further evaluation of symptoms [address] [place] [address] [facility] Emergency Department Emergency Medicine Go to  If symptoms worsen 34 [facility] [address] [facility] Emergency Department, [address] [facility] [facility] [place] [address]    Matias Corona MD Urology Call in 2 days for further evaluation of symptoms [address]  [facility]  [phone]             Discharge Medication List as of 5/22/2023  2:53 PM      START taking these medications    Details   ondansetron (ZOFRAN-ODT) 4 mg disintegrating tablet Take 1 tablet (4 mg total) by mouth every 6 (six) hours as needed for nausea or vomiting, Starting Mon 5/22/2023, Print      oxyCODONE-acetaminophen (PERCOCET) 5-325 mg per tablet Take 1 tablet by mouth every 6 (six) hours as needed for severe pain (flank pain/dx hydronephrosis/initial rx ) for up to 3 days Label no driving no etoh  Initial rx    Dx: Max Daily Amount: 4 tablets, Starting Mon 5/22/2023, Until Thu 5/25/2023 at 2359,  Print         CONTINUE these medications which have NOT CHANGED    Details   fluticasone (FLONASE) 50 mcg/act nasal spray 2 sprays into each nostril daily as needed for allergies, Starting Fri 4/29/2022, Normal      predniSONE 10 mg tablet Take 4 pills/d for 3 days then 3 pills/d for 3 days, then 2 pills/d for 3 days then 1 pill/d for 3 days, Normal tamsulosin (FLOMAX) 0 4 mg take 1 capsule by mouth once daily WITH DINNER, Normal             Outpatient Discharge Orders   CT renal protocol   Standing Status: Future Standing Exp   Date: 05/22/27       PDMP Review     None          ED Provider  Electronically Signed by           Saira Haider PA-C  05/22/23 6624

## 2023-05-22 NOTE — DISCHARGE INSTRUCTIONS
Continue tamsulosin 0 4 mg daily  Use zofran for nausea/vomiting as needed  Encourage fluids  Monitor urine output - if you develop blood clots in urine, inability to pass urine, develop fevers, chills, repeated vomiting or worsening pain despite pain medication return to ED for further evaluation   Follow up with PCP and urologist for further treatment as directed

## 2023-05-22 NOTE — TELEPHONE ENCOUNTER
Ute Franks is a 81-year-old history of BPH, prior TURP in 2019 presented to the ED with flank pain  CT showing right-sided hydronephrosis no obstructing stone, differential includes recently passed stone, blood clot, tumor  Normal renal function  UA showing +blood unremarkable for infection  Mild leukocytosis at 10  Repeat CT ordered for 1 week  Please schedule follow-up after CT is obtained  Thanks!

## 2023-05-23 NOTE — TELEPHONE ENCOUNTER
Needs CT scheduled first -appt will have to be elsewhere- no availability in [facility]   L/m with phone numbers for CS and urology

## 2023-06-01 ENCOUNTER — HOSPITAL ENCOUNTER (OUTPATIENT)
Dept: RADIOLOGY | Facility: HOSPITAL | Age: 73
End: 2023-06-01
Payer: MEDICARE

## 2023-06-01 DIAGNOSIS — J18.9 PNEUMONIA OF RIGHT LOWER LOBE DUE TO INFECTIOUS ORGANISM: ICD-10-CM

## 2023-06-01 PROCEDURE — 71046 X-RAY EXAM CHEST 2 VIEWS: CPT

## 2023-06-13 ENCOUNTER — HOSPITAL ENCOUNTER (OUTPATIENT)
Dept: RADIOLOGY | Facility: MEDICAL CENTER | Age: 73
Discharge: HOME | End: 2023-06-13
Payer: MEDICARE

## 2023-06-13 DIAGNOSIS — N13.30 HYDRONEPHROSIS OF RIGHT KIDNEY: ICD-10-CM

## 2023-06-13 PROCEDURE — 74178 CT ABD&PLV WO CNTR FLWD CNTR: CPT

## 2023-06-13 PROCEDURE — G1004 CDSM NDSC: HCPCS

## 2023-06-13 RX ADMIN — IOHEXOL 100 ML: 350 INJECTION, SOLUTION INTRAVENOUS at 09:22

## 2023-06-19 DIAGNOSIS — N40.0 BENIGN PROSTATIC HYPERPLASIA WITHOUT LOWER URINARY TRACT SYMPTOMS: ICD-10-CM

## 2023-06-19 RX ORDER — TAMSULOSIN HYDROCHLORIDE 0.4 MG/1
0.4 CAPSULE ORAL
Qty: 90 CAPSULE | Refills: 1 | Status: SHIPPED | OUTPATIENT
Start: 2023-06-19

## 2023-06-30 PROBLEM — J18.9 PNEUMONIA OF RIGHT LOWER LOBE DUE TO INFECTIOUS ORGANISM: Status: RESOLVED | Noted: 2023-05-01 | Resolved: 2023-06-30

## 2023-06-30 PROBLEM — Z00.00 MEDICARE ANNUAL WELLNESS VISIT, SUBSEQUENT: Status: RESOLVED | Noted: 2023-05-01 | Resolved: 2023-06-30

## 2023-07-09 PROBLEM — R05.2 SUBACUTE COUGH: Status: RESOLVED | Noted: 2023-05-10 | Resolved: 2023-07-09

## 2023-08-14 ENCOUNTER — APPOINTMENT (OUTPATIENT)
Dept: RADIOLOGY | Facility: CLINIC | Age: 73
End: 2023-08-14
Payer: MEDICARE

## 2023-08-14 ENCOUNTER — OFFICE VISIT (OUTPATIENT)
Dept: OBGYN CLINIC | Facility: CLINIC | Age: 73
End: 2023-08-14
Payer: MEDICARE

## 2023-08-14 VITALS
WEIGHT: 190.8 LBS | DIASTOLIC BLOOD PRESSURE: 84 MMHG | SYSTOLIC BLOOD PRESSURE: 132 MMHG | HEIGHT: 72 IN | HEART RATE: 75 BPM | BODY MASS INDEX: 25.84 KG/M2

## 2023-08-14 DIAGNOSIS — M54.50 CHRONIC MIDLINE LOW BACK PAIN WITHOUT SCIATICA: Primary | ICD-10-CM

## 2023-08-14 DIAGNOSIS — M41.50 DEGENERATIVE SCOLIOSIS: ICD-10-CM

## 2023-08-14 DIAGNOSIS — M54.50 LOW BACK PAIN, UNSPECIFIED BACK PAIN LATERALITY, UNSPECIFIED CHRONICITY, UNSPECIFIED WHETHER SCIATICA PRESENT: ICD-10-CM

## 2023-08-14 DIAGNOSIS — M48.061 SPINAL STENOSIS OF LUMBAR REGION WITHOUT NEUROGENIC CLAUDICATION: ICD-10-CM

## 2023-08-14 DIAGNOSIS — G89.29 CHRONIC MIDLINE LOW BACK PAIN WITHOUT SCIATICA: Primary | ICD-10-CM

## 2023-08-14 DIAGNOSIS — M51.36 DDD (DEGENERATIVE DISC DISEASE), LUMBAR: ICD-10-CM

## 2023-08-14 DIAGNOSIS — M54.9 MID BACK PAIN: ICD-10-CM

## 2023-08-14 PROCEDURE — 99214 OFFICE O/P EST MOD 30 MIN: CPT | Performed by: ORTHOPAEDIC SURGERY

## 2023-08-14 PROCEDURE — 72110 X-RAY EXAM L-2 SPINE 4/>VWS: CPT

## 2023-12-09 DIAGNOSIS — N40.0 BENIGN PROSTATIC HYPERPLASIA WITHOUT LOWER URINARY TRACT SYMPTOMS: ICD-10-CM

## 2023-12-11 RX ORDER — TAMSULOSIN HYDROCHLORIDE 0.4 MG/1
CAPSULE ORAL
Qty: 90 CAPSULE | Refills: 1 | Status: SHIPPED | OUTPATIENT
Start: 2023-12-11

## 2023-12-18 NOTE — TELEPHONE ENCOUNTER
Patient called medication refill line in regards to his tamsulosin.    I informed the patient there was a script called in on 12- for 90 days plus one refill. He is going to call the pharmacy.

## 2024-02-21 PROBLEM — Z13.83 SCREENING FOR CARDIOVASCULAR, RESPIRATORY, AND GENITOURINARY DISEASES: Status: RESOLVED | Noted: 2019-09-04 | Resolved: 2024-02-21

## 2024-02-21 PROBLEM — Z12.11 COLON CANCER SCREENING: Status: RESOLVED | Noted: 2019-09-04 | Resolved: 2024-02-21

## 2024-02-21 PROBLEM — Z13.89 SCREENING FOR CARDIOVASCULAR, RESPIRATORY, AND GENITOURINARY DISEASES: Status: RESOLVED | Noted: 2019-09-04 | Resolved: 2024-02-21

## 2024-02-21 PROBLEM — Z13.1 SCREENING FOR DIABETES MELLITUS (DM): Status: RESOLVED | Noted: 2019-09-04 | Resolved: 2024-02-21

## 2024-02-21 PROBLEM — Z13.6 SCREENING FOR CARDIOVASCULAR, RESPIRATORY, AND GENITOURINARY DISEASES: Status: RESOLVED | Noted: 2019-09-04 | Resolved: 2024-02-21

## 2024-02-21 PROBLEM — Z12.5 PROSTATE CANCER SCREENING: Status: RESOLVED | Noted: 2019-09-04 | Resolved: 2024-02-21

## 2024-05-09 ENCOUNTER — OFFICE VISIT (OUTPATIENT)
Dept: FAMILY MEDICINE CLINIC | Facility: CLINIC | Age: 74
End: 2024-05-09
Payer: MEDICARE

## 2024-05-09 VITALS
HEART RATE: 56 BPM | DIASTOLIC BLOOD PRESSURE: 76 MMHG | HEIGHT: 72 IN | RESPIRATION RATE: 18 BRPM | WEIGHT: 196.6 LBS | TEMPERATURE: 97.6 F | SYSTOLIC BLOOD PRESSURE: 130 MMHG | BODY MASS INDEX: 26.63 KG/M2

## 2024-05-09 DIAGNOSIS — Z00.00 MEDICARE ANNUAL WELLNESS VISIT, SUBSEQUENT: Primary | ICD-10-CM

## 2024-05-09 DIAGNOSIS — Z13.6 SCREENING FOR CARDIOVASCULAR CONDITION: ICD-10-CM

## 2024-05-09 DIAGNOSIS — Z12.5 PROSTATE CANCER SCREENING: ICD-10-CM

## 2024-05-09 DIAGNOSIS — Z13.0 SCREENING FOR DEFICIENCY ANEMIA: ICD-10-CM

## 2024-05-09 DIAGNOSIS — Z91.89 FRAMINGHAM CARDIAC RISK >20% IN NEXT 10 YEARS: ICD-10-CM

## 2024-05-09 DIAGNOSIS — Z12.11 COLON CANCER SCREENING: ICD-10-CM

## 2024-05-09 DIAGNOSIS — R06.09 OTHER FORMS OF DYSPNEA: ICD-10-CM

## 2024-05-09 DIAGNOSIS — Z13.1 SCREENING FOR DIABETES MELLITUS (DM): ICD-10-CM

## 2024-05-09 DIAGNOSIS — Z13.220 SCREENING FOR LIPID DISORDERS: ICD-10-CM

## 2024-05-09 PROBLEM — R73.03 PREDIABETES: Status: RESOLVED | Noted: 2023-05-01 | Resolved: 2024-05-09

## 2024-05-09 PROCEDURE — G0439 PPPS, SUBSEQ VISIT: HCPCS | Performed by: FAMILY MEDICINE

## 2024-05-09 PROCEDURE — 99214 OFFICE O/P EST MOD 30 MIN: CPT | Performed by: FAMILY MEDICINE

## 2024-05-09 NOTE — PROGRESS NOTES
Assessment/Plan:    No problem-specific Assessment & Plan notes found for this encounter.    Cardiac concerns with sob  Stress test r/o CAD  Fram score 23% aware but reluctant to start statin  CAC testing discussed for risk assessment  Declines statin today    Stress test ordered  Vascular screen ordered    MOORE  If stress test not normal, next step would be cardiology    Bph stable  No obstruction per pt    Could offer pulmonary eval for PFT eval     Diagnoses and all orders for this visit:    Medicare annual wellness visit, subsequent    Colon cancer screening  -     Ambulatory referral to Gastroenterology; Future    Screening for diabetes mellitus (DM)  -     Comprehensive metabolic panel; Future    Screening for lipid disorders  -     Lipid Panel with Direct LDL reflex; Future    Prostate cancer screening  -     PSA, Total Screen; Future    Screening for cardiovascular condition  -     CT coronary calcium score; Future  -     VAS screening; Future    Screening for deficiency anemia  -     Stress test only, exercise; Future  -     CBC and differential; Future    Other forms of dyspnea  -     Stress test only, exercise; Future    Priddy cardiac risk >20% in next 10 years  Comments:  23 in 2024        No follow-ups on file.    Subjective:      Patient ID: Nickolas Saeed is a 74 y.o. male.    Chief Complaint   Patient presents with    Follow-up     Fawn Mccord CMA        HPI  Feels wants heart checked  Worries about age  Hx of lumbar compression fractures  Has lost height  Feels his posture is affecting his breathing    Likes to hunt  Farber hunting  Rough trails  Unable to run well due to low back  Feels more fatigue than he used to, takes a day to recover    More MOORE  Father had CHF    Fram 24%    The following portions of the patient's history were reviewed and updated as appropriate: allergies, current medications, past family history, past medical history, past social history, past surgical history and  problem list.    Review of Systems   Respiratory:  Positive for shortness of breath.    Cardiovascular:  Negative for chest pain and leg swelling.         Current Outpatient Medications   Medication Sig Dispense Refill    fluticasone (FLONASE) 50 mcg/act nasal spray 2 sprays into each nostril daily as needed for allergies 16 g 5    tamsulosin (FLOMAX) 0.4 mg take 1 capsule by mouth once daily with dinner 90 capsule 1     No current facility-administered medications for this visit.       Objective:    /76   Pulse 56   Temp 97.6 °F (36.4 °C)   Resp 18   Ht 6' (1.829 m)   Wt 89.2 kg (196 lb 9.6 oz)   BMI 26.66 kg/m²        Physical Exam  Vitals and nursing note reviewed.   Constitutional:       General: He is not in acute distress.     Appearance: He is well-developed. He is not ill-appearing.   HENT:      Head: Normocephalic.      Right Ear: Tympanic membrane normal.      Left Ear: Tympanic membrane normal.      Nose: No congestion.   Eyes:      General: No scleral icterus.     Conjunctiva/sclera: Conjunctivae normal.   Neck:      Vascular: No carotid bruit.   Cardiovascular:      Rate and Rhythm: Normal rate and regular rhythm.      Heart sounds: No murmur heard.  Pulmonary:      Effort: Pulmonary effort is normal. No respiratory distress.      Breath sounds: No wheezing.   Abdominal:      General: There is no distension.      Palpations: Abdomen is soft.      Tenderness: There is no abdominal tenderness.   Musculoskeletal:         General: No deformity.      Cervical back: Neck supple.      Right lower leg: No edema.      Left lower leg: No edema.   Skin:     General: Skin is warm and dry.      Coloration: Skin is not jaundiced or pale.   Neurological:      Mental Status: He is alert.      Motor: No weakness.      Gait: Gait normal.   Psychiatric:         Mood and Affect: Mood normal.         Behavior: Behavior normal.         Thought Content: Thought content normal.                José Shell  DO

## 2024-05-10 NOTE — PATIENT INSTRUCTIONS
Medicare Preventive Visit Patient Instructions  Thank you for completing your Welcome to Medicare Visit or Medicare Annual Wellness Visit today. Your next wellness visit will be due in one year (5/10/2025).  The screening/preventive services that you may require over the next 5-10 years are detailed below. Some tests may not apply to you based off risk factors and/or age. Screening tests ordered at today's visit but not completed yet may show as past due. Also, please note that scanned in results may not display below.  Preventive Screenings:  Service Recommendations Previous Testing/Comments   Colorectal Cancer Screening  Colonoscopy    Fecal Occult Blood Test (FOBT)/Fecal Immunochemical Test (FIT)  Fecal DNA/Cologuard Test  Flexible Sigmoidoscopy Age: 45-75 years old   Colonoscopy: every 10 years (May be performed more frequently if at higher risk)  OR  FOBT/FIT: every 1 year  OR  Cologuard: every 3 years  OR  Sigmoidoscopy: every 5 years  Screening may be recommended earlier than age 45 if at higher risk for colorectal cancer. Also, an individualized decision between you and your healthcare provider will decide whether screening between the ages of 76-85 would be appropriate. Colonoscopy: 10/30/2006  FOBT/FIT: Not on file  Cologuard: Not on file  Sigmoidoscopy: Not on file          Prostate Cancer Screening Individualized decision between patient and health care provider in men between ages of 55-69   Medicare will cover every 12 months beginning on the day after your 50th birthday PSA: 0.3 ng/mL           Hepatitis C Screening Once for adults born between 1945 and 1965  More frequently in patients at high risk for Hepatitis C Hep C Antibody: Not on file        Diabetes Screening 1-2 times per year if you're at risk for diabetes or have pre-diabetes Fasting glucose: No results in last 5 years (No results in last 5 years)  A1C: 6.1 % (11/10/2021)      Cholesterol Screening Once every 5 years if you don't have a  lipid disorder. May order more often based on risk factors. Lipid panel: 02/17/2023         Other Preventive Screenings Covered by Medicare:  Abdominal Aortic Aneurysm (AAA) Screening: covered once if your at risk. You're considered to be at risk if you have a family history of AAA or a male between the age of 65-75 who smoking at least 100 cigarettes in your lifetime.  Lung Cancer Screening: covers low dose CT scan once per year if you meet all of the following conditions: (1) Age 55-77; (2) No signs or symptoms of lung cancer; (3) Current smoker or have quit smoking within the last 15 years; (4) You have a tobacco smoking history of at least 20 pack years (packs per day x number of years you smoked); (5) You get a written order from a healthcare provider.  Glaucoma Screening: covered annually if you're considered high risk: (1) You have diabetes OR (2) Family history of glaucoma OR (3)  aged 50 and older OR (4)  American aged 65 and older  Osteoporosis Screening: covered every 2 years if you meet one of the following conditions: (1) Have a vertebral abnormality; (2) On glucocorticoid therapy for more than 3 months; (3) Have primary hyperparathyroidism; (4) On osteoporosis medications and need to assess response to drug therapy.  HIV Screening: covered annually if you're between the age of 15-65. Also covered annually if you are younger than 15 and older than 65 with risk factors for HIV infection. For pregnant patients, it is covered up to 3 times per pregnancy.    Immunizations:  Immunization Recommendations   Influenza Vaccine Annual influenza vaccination during flu season is recommended for all persons aged >= 6 months who do not have contraindications   Pneumococcal Vaccine   * Pneumococcal conjugate vaccine = PCV13 (Prevnar 13), PCV15 (Vaxneuvance), PCV20 (Prevnar 20)  * Pneumococcal polysaccharide vaccine = PPSV23 (Pneumovax) Adults 19-65 yo with certain risk factors or if 65+ yo  If  never received any pneumonia vaccine: recommend Prevnar 20 (PCV20)  Give PCV20 if previously received 1 dose of PCV13 or PPSV23   Hepatitis B Vaccine 3 dose series if at intermediate or high risk (ex: diabetes, end stage renal disease, liver disease)   Respiratory syncytial virus (RSV) Vaccine - COVERED BY MEDICARE PART D  * RSVPreF3 (Arexvy) CDC recommends that adults 60 years of age and older may receive a single dose of RSV vaccine using shared clinical decision-making (SCDM)   Tetanus (Td) Vaccine - COST NOT COVERED BY MEDICARE PART B Following completion of primary series, a booster dose should be given every 10 years to maintain immunity against tetanus. Td may also be given as tetanus wound prophylaxis.   Tdap Vaccine - COST NOT COVERED BY MEDICARE PART B Recommended at least once for all adults. For pregnant patients, recommended with each pregnancy.   Shingles Vaccine (Shingrix) - COST NOT COVERED BY MEDICARE PART B  2 shot series recommended in those 19 years and older who have or will have weakened immune systems or those 50 years and older     Health Maintenance Due:      Topic Date Due   • Hepatitis C Screening  Never done   • Colorectal Cancer Screening  10/30/2016     Immunizations Due:      Topic Date Due   • Pneumococcal Vaccine: 65+ Years (1 of 1 - PCV) Never done   • COVID-19 Vaccine (3 - 2023-24 season) 09/01/2023     Advance Directives   What are advance directives?  Advance directives are legal documents that state your wishes and plans for medical care. These plans are made ahead of time in case you lose your ability to make decisions for yourself. Advance directives can apply to any medical decision, such as the treatments you want, and if you want to donate organs.   What are the types of advance directives?  There are many types of advance directives, and each state has rules about how to use them. You may choose a combination of any of the following:  Living will:  This is a written record  of the treatment you want. You can also choose which treatments you do not want, which to limit, and which to stop at a certain time. This includes surgery, medicine, IV fluid, and tube feedings.   Durable power of  for healthcare (DPAHC):  This is a written record that states who you want to make healthcare choices for you when you are unable to make them for yourself. This person, called a proxy, is usually a family member or a friend. You may choose more than 1 proxy.  Do not resuscitate (DNR) order:  A DNR order is used in case your heart stops beating or you stop breathing. It is a request not to have certain forms of treatment, such as CPR. A DNR order may be included in other types of advance directives.  Medical directive:  This covers the care that you want if you are in a coma, near death, or unable to make decisions for yourself. You can list the treatments you want for each condition. Treatment may include pain medicine, surgery, blood transfusions, dialysis, IV or tube feedings, and a ventilator (breathing machine).  Values history:  This document has questions about your views, beliefs, and how you feel and think about life. This information can help others choose the care that you would choose.  Why are advance directives important?  An advance directive helps you control your care. Although spoken wishes may be used, it is better to have your wishes written down. Spoken wishes can be misunderstood, or not followed. Treatments may be given even if you do not want them. An advance directive may make it easier for your family to make difficult choices about your care.   Weight Management   Why it is important to manage your weight:  Being overweight increases your risk of health conditions such as heart disease, high blood pressure, type 2 diabetes, and certain types of cancer. It can also increase your risk for osteoarthritis, sleep apnea, and other respiratory problems. Aim for a slow, steady  weight loss. Even a small amount of weight loss can lower your risk of health problems.  How to lose weight safely:  A safe and healthy way to lose weight is to eat fewer calories and get regular exercise. You can lose up about 1 pound a week by decreasing the number of calories you eat by 500 calories each day.   Healthy meal plan for weight management:  A healthy meal plan includes a variety of foods, contains fewer calories, and helps you stay healthy. A healthy meal plan includes the following:  Eat whole-grain foods more often.  A healthy meal plan should contain fiber. Fiber is the part of grains, fruits, and vegetables that is not broken down by your body. Whole-grain foods are healthy and provide extra fiber in your diet. Some examples of whole-grain foods are whole-wheat breads and pastas, oatmeal, brown rice, and bulgur.  Eat a variety of vegetables every day.  Include dark, leafy greens such as spinach, kale, magalis greens, and mustard greens. Eat yellow and orange vegetables such as carrots, sweet potatoes, and winter squash.   Eat a variety of fruits every day.  Choose fresh or canned fruit (canned in its own juice or light syrup) instead of juice. Fruit juice has very little or no fiber.  Eat low-fat dairy foods.  Drink fat-free (skim) milk or 1% milk. Eat fat-free yogurt and low-fat cottage cheese. Try low-fat cheeses such as mozzarella and other reduced-fat cheeses.  Choose meat and other protein foods that are low in fat.  Choose beans or other legumes such as split peas or lentils. Choose fish, skinless poultry (chicken or turkey), or lean cuts of red meat (beef or pork). Before you cook meat or poultry, cut off any visible fat.   Use less fat and oil.  Try baking foods instead of frying them. Add less fat, such as margarine, sour cream, regular salad dressing and mayonnaise to foods. Eat fewer high-fat foods. Some examples of high-fat foods include french fries, doughnuts, ice cream, and  cakes.  Eat fewer sweets.  Limit foods and drinks that are high in sugar. This includes candy, cookies, regular soda, and sweetened drinks.  Exercise:  Exercise at least 30 minutes per day on most days of the week. Some examples of exercise include walking, biking, dancing, and swimming. You can also fit in more physical activity by taking the stairs instead of the elevator or parking farther away from stores. Ask your healthcare provider about the best exercise plan for you.      © Copyright [employer] 2018 Information is for End User's use only and may not be sold, redistributed or otherwise used for commercial purposes. All illustrations and images included in CareNotes® are the copyrighted property of [employer].D[employer]M., Inc. or [employer]

## 2024-05-16 ENCOUNTER — HOSPITAL ENCOUNTER (OUTPATIENT)
Dept: NON INVASIVE DIAGNOSTICS | Facility: HOSPITAL | Age: 74
Discharge: HOME | End: 2024-05-16
Payer: MEDICARE

## 2024-05-16 VITALS — OXYGEN SATURATION: 98 % | SYSTOLIC BLOOD PRESSURE: 138 MMHG | HEART RATE: 104 BPM | DIASTOLIC BLOOD PRESSURE: 80 MMHG

## 2024-05-16 DIAGNOSIS — Z13.0 SCREENING FOR DEFICIENCY ANEMIA: ICD-10-CM

## 2024-05-16 DIAGNOSIS — R06.09 OTHER FORMS OF DYSPNEA: ICD-10-CM

## 2024-05-16 LAB
CHEST PAIN STATEMENT: NORMAL
MAX DIASTOLIC BP: 80 MMHG
MAX HR PERCENT: 132 %
MAX HR: 196 BPM
MAX PREDICTED HEART RATE: 148 BPM
PROTOCOL NAME: NORMAL
RATE PRESSURE PRODUCT: NORMAL
REASON FOR TERMINATION: NORMAL
SL CV STRESS RECOVERY BP: NORMAL MMHG
SL CV STRESS RECOVERY HR: 97 BPM
SL CV STRESS RECOVERY O2 SAT: 97 %
SL CV STRESS STAGE REACHED: 3
STRESS ANGINA INDEX: 0
STRESS BASELINE BP: NORMAL MMHG
STRESS BASELINE HR: 104 BPM
STRESS O2 SAT REST: 98 %
STRESS PEAK HR: 196 BPM
STRESS POST ESTIMATED WORKLOAD: 9.5 METS
STRESS POST EXERCISE DUR MIN: 6 MIN
STRESS POST EXERCISE DUR MIN: 6 MIN
STRESS POST EXERCISE DUR SEC: 50 SEC
STRESS POST EXERCISE DUR SEC: 50 SEC
STRESS POST O2 SAT PEAK: 97 %
STRESS POST PEAK BP: 172 MMHG
STRESS POST PEAK HR: 196 BPM
STRESS POST PEAK SYSTOLIC BP: 172 MMHG
TARGET HR FORMULA: NORMAL
TEST INDICATION: NORMAL

## 2024-05-16 PROCEDURE — 93016 CV STRESS TEST SUPVJ ONLY: CPT | Performed by: INTERNAL MEDICINE

## 2024-05-16 PROCEDURE — 93017 CV STRESS TEST TRACING ONLY: CPT

## 2024-05-16 PROCEDURE — 93018 CV STRESS TEST I&R ONLY: CPT | Performed by: INTERNAL MEDICINE

## 2024-06-07 DIAGNOSIS — N40.0 BENIGN PROSTATIC HYPERPLASIA WITHOUT LOWER URINARY TRACT SYMPTOMS: ICD-10-CM

## 2024-06-07 RX ORDER — TAMSULOSIN HYDROCHLORIDE 0.4 MG/1
CAPSULE ORAL
Qty: 90 CAPSULE | Refills: 1 | Status: SHIPPED | OUTPATIENT
Start: 2024-06-07

## 2024-06-08 PROBLEM — Z00.00 MEDICARE ANNUAL WELLNESS VISIT, SUBSEQUENT: Status: RESOLVED | Noted: 2024-05-09 | Resolved: 2024-06-08

## 2024-11-20 ENCOUNTER — OFFICE VISIT (OUTPATIENT)
Dept: FAMILY MEDICINE CLINIC | Facility: CLINIC | Age: 74
End: 2024-11-20
Payer: MEDICARE

## 2024-11-20 VITALS
RESPIRATION RATE: 18 BRPM | WEIGHT: 195.6 LBS | SYSTOLIC BLOOD PRESSURE: 132 MMHG | TEMPERATURE: 98 F | BODY MASS INDEX: 26.49 KG/M2 | DIASTOLIC BLOOD PRESSURE: 80 MMHG | HEART RATE: 88 BPM | HEIGHT: 72 IN

## 2024-11-20 DIAGNOSIS — Z12.11 COLON CANCER SCREENING: ICD-10-CM

## 2024-11-20 DIAGNOSIS — U07.1 COVID-19: Primary | ICD-10-CM

## 2024-11-20 DIAGNOSIS — Z90.79 S/P TURP: ICD-10-CM

## 2024-11-20 DIAGNOSIS — N40.0 BENIGN PROSTATIC HYPERPLASIA WITHOUT LOWER URINARY TRACT SYMPTOMS: ICD-10-CM

## 2024-11-20 PROCEDURE — 99214 OFFICE O/P EST MOD 30 MIN: CPT | Performed by: FAMILY MEDICINE

## 2024-11-20 RX ORDER — METHYLPREDNISOLONE 4 MG/1
TABLET ORAL
Qty: 21 TABLET | Refills: 0 | Status: SHIPPED | OUTPATIENT
Start: 2024-11-20 | End: 2024-11-26

## 2024-11-20 NOTE — PROGRESS NOTES
Assessment/Plan:    No problem-specific Assessment & Plan notes found for this encounter.    Covid poc positive but symptoms started over 5 days ago  Paxlovid uses, indication, side effects discussed.  Will opt to tx with medrol and supportive care, mucinex DM, otc, fluids, rest, vitamins, f/u if no better    [facility] quarantine guidelines were discussed/advised.  ER if worse or severe sob    Bph stable  He is wondering if he could stop flomax 0.4mg/d  Suggest he can try for 5-7 and monitor for any slowing or obstructive symptoms  He may be interested in seeing Dr Flores again in the future since Dr. Flores is back in the area     Diagnoses and all orders for this visit:    COVID-19  -     methylPREDNISolone 4 MG tablet therapy pack; Use as directed on package    Colon cancer screening  -     Ambulatory referral to Gastroenterology; Future    S/P TURP    Benign prostatic hyperplasia without lower urinary tract symptoms        Return if symptoms worsen or fail to improve.    Subjective:      Patient ID: Nickolas Saeed is a 74 y.o. male.    Chief Complaint   Patient presents with    Cold Like Symptoms     Fawn Mccord CMA     Cough     X 1 week    Nasal Congestion     X 2 days    Generalized Body Aches    Headache       HPI  Poc covid pos in office    1 covid infection in past and needed oxygen  Some vax in past    Sister positive 2w ago and exposed to her but started sx about 1 w afterwards    First symptom 1w ago, cough    The following portions of the patient's history were reviewed and updated as appropriate: allergies, current medications, past family history, past medical history, past social history, past surgical history and problem list.    Review of Systems   Respiratory:  Positive for cough. Negative for shortness of breath and wheezing.          Current Outpatient Medications   Medication Sig Dispense Refill    fluticasone (FLONASE) 50 mcg/act nasal spray 2 sprays into each nostril daily as needed for  allergies 16 g 5    methylPREDNISolone 4 MG tablet therapy pack Use as directed on package 21 tablet 0    tamsulosin (FLOMAX) 0.4 mg take 1 capsule by mouth once daily with dinner 90 capsule 1     No current facility-administered medications for this visit.       Objective:    /80   Pulse 88   Temp 98 °F (36.7 °C)   Resp 18   Ht 6' (1.829 m)   Wt 88.7 kg (195 lb 9.6 oz)   BMI 26.53 kg/m²        Physical Exam  Vitals and nursing note reviewed.   Constitutional:       General: He is not in acute distress.     Appearance: He is well-developed. He is not ill-appearing.   HENT:      Head: Normocephalic.   Eyes:      General: No scleral icterus.     Conjunctiva/sclera: Conjunctivae normal.   Cardiovascular:      Rate and Rhythm: Normal rate and regular rhythm.      Heart sounds: No murmur heard.  Pulmonary:      Effort: Pulmonary effort is normal. No respiratory distress.      Breath sounds: Normal breath sounds. No stridor. No wheezing, rhonchi or rales.   Abdominal:      Palpations: Abdomen is soft.      Tenderness: There is no abdominal tenderness.   Musculoskeletal:         General: No deformity.      Cervical back: Neck supple.      Right lower leg: No edema.      Left lower leg: No edema.   Skin:     General: Skin is warm and dry.      Coloration: Skin is not jaundiced or pale.   Neurological:      Mental Status: He is alert.      Motor: No weakness.      Gait: Gait normal.   Psychiatric:         Mood and Affect: Mood normal.         Behavior: Behavior normal.         Thought Content: Thought content normal.                José Shell DO

## 2024-12-01 DIAGNOSIS — N40.0 BENIGN PROSTATIC HYPERPLASIA WITHOUT LOWER URINARY TRACT SYMPTOMS: ICD-10-CM

## 2024-12-03 RX ORDER — TAMSULOSIN HYDROCHLORIDE 0.4 MG/1
CAPSULE ORAL
Qty: 90 CAPSULE | Refills: 1 | Status: SHIPPED | OUTPATIENT
Start: 2024-12-03

## 2025-03-13 ENCOUNTER — TELEPHONE (OUTPATIENT)
Dept: GASTROENTEROLOGY | Facility: CLINIC | Age: 75
End: 2025-03-13

## 2025-03-13 NOTE — TELEPHONE ENCOUNTER
I spoke to pt , at this time, he doesn't want to have colon  done . He will think about it and call us back if he changes his mind. Sb

## 2025-04-21 ENCOUNTER — OFFICE VISIT (OUTPATIENT)
Dept: OBGYN CLINIC | Facility: CLINIC | Age: 75
End: 2025-04-21
Payer: MEDICARE

## 2025-04-21 VITALS — BODY MASS INDEX: 26.82 KG/M2 | HEIGHT: 72 IN | WEIGHT: 198 LBS

## 2025-04-21 DIAGNOSIS — G89.29 CHRONIC RIGHT-SIDED LOW BACK PAIN WITHOUT SCIATICA: Primary | ICD-10-CM

## 2025-04-21 DIAGNOSIS — M54.50 CHRONIC RIGHT-SIDED LOW BACK PAIN WITHOUT SCIATICA: Primary | ICD-10-CM

## 2025-04-21 PROCEDURE — 99213 OFFICE O/P EST LOW 20 MIN: CPT | Performed by: ORTHOPAEDIC SURGERY

## 2025-04-21 NOTE — PROGRESS NOTES
Name: Nickolas Saeed      : 1950      MRN: [id number]  Encounter Provider: Elliot Cam DO  Encounter Date: 2025   Encounter department: [facility] [place]  :  Assessment & Plan  Chronic right-sided low back pain without sciatica    Orders:    Ambulatory Referral to Physical Therapy; Future          Right sided low back pain.  Treatment options were discussed including outpatient physical therapy  Physical therapy referral provided focused on core strengthening and postural exercises  Recommend activity modification in the meantime.  Recommend OTC analgesics as needed for symptom management  Follow up with Dr. Morrow as needed for reevaluation or further imaging    History of the Present Illness   History of Present Illness   HPI   Nickolas Saeed is a 75 y.o. male with low back pain, history of  L1 and L5 compression fractures in , L4 compression fracture in . Patient saw Dr. Fragoso 2023 who referred the patient to physical therapy. His most recent flare up started 3-4 days ago. He describes a stabbing nerve pain is his right sided low back. He is taking ibuprofen as needed for symptom management. Pain worse with walking, better with rest. He admits he is very active, enjoys making long bows and bow strings. Also enjoys hunting. Denies radiating symptoms. No previous history of injections.        Review of Systems     Review of Systems   Constitutional:  Negative for chills and fever.   HENT:  Negative for ear pain and sore throat.    Eyes:  Negative for pain and visual disturbance.   Respiratory:  Negative for cough and shortness of breath.    Cardiovascular:  Negative for chest pain and palpitations.   Gastrointestinal:  Negative for abdominal pain and vomiting.   Genitourinary:  Negative for dysuria and hematuria.   Musculoskeletal:  Negative for arthralgias and back pain.   Skin:  Negative for color change and rash.   Neurological:  Negative for  seizures and syncope.   All other systems reviewed and are negative.      Physical Exam   Objective   Ht 6' (1.829 m)   Wt 89.8 kg (198 lb)   BMI 26.85 kg/m²        Lumbar spine  There is no midline tenderness present.    There is right paraspinal tenderness.    Sensation intact to light touch left L2 through S1 dermatomes.   Sensation intact to light touch right L2 through S1 dermatomes   Left Motor: 5/5 iliopsoas, 5/5 quadriceps, 5/5 tibialis anterior, 5/5 extensor hallucis longus, and 5/5 gastrocsoleus.   Right Motor: 5/5 iliopsoas, 5/5 quadriceps, 5/5 tibialis anterior, 5/5 extensor hallucis longus, and 5/5 gastrocsoleus.   Negative clonus bilaterally.    Negative Babinski bilaterally  Straight leg raise test is negative Bilateral   The patient is well perfused distally.      Data Review     I have personally reviewed pertinent films in PACS, and my interpretation follows.    X-rays taken 2023 of lumbar spine independently reviewed and demonstrate multilevel degenerative changes, compression deformity L1 and L4, degenerative scoliosis.     Lab Results   Component Value Date/Time    HGBA1C 6.1 (H) 11/10/2021 05:34 AM    CRP 10.5 (H) 2021 05:56 AM       Social History     Tobacco Use    Smoking status: Former     Current packs/day: 0.00     Types: Cigarettes     Quit date: 2016     Years since quittin.7    Smokeless tobacco: Never   Vaping Use    Vaping status: Never Used   Substance Use Topics    Alcohol use: Never    Drug use: Never           Procedures  None.    Phuong Denton   Scribe Attestation      I,:  Phuong Denton am acting as a scribe while in the presence of the attending physician.:       I,:  Elliot Cam DO personally performed the services described in this documentation    as scribed in my presence.:

## 2025-05-13 ENCOUNTER — EVALUATION (OUTPATIENT)
Dept: PHYSICAL THERAPY | Facility: CLINIC | Age: 75
End: 2025-05-13
Attending: ORTHOPAEDIC SURGERY
Payer: MEDICARE

## 2025-05-13 DIAGNOSIS — G89.29 CHRONIC LOW BACK PAIN WITHOUT SCIATICA, UNSPECIFIED BACK PAIN LATERALITY: Primary | ICD-10-CM

## 2025-05-13 DIAGNOSIS — M54.50 CHRONIC LOW BACK PAIN WITHOUT SCIATICA, UNSPECIFIED BACK PAIN LATERALITY: Primary | ICD-10-CM

## 2025-05-13 PROCEDURE — 97110 THERAPEUTIC EXERCISES: CPT | Performed by: PHYSICAL THERAPIST

## 2025-05-13 PROCEDURE — 97161 PT EVAL LOW COMPLEX 20 MIN: CPT | Performed by: PHYSICAL THERAPIST
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
## 2025-05-21 ENCOUNTER — OFFICE VISIT (OUTPATIENT)
Dept: PHYSICAL THERAPY | Facility: CLINIC | Age: 75
End: 2025-05-21
Attending: ORTHOPAEDIC SURGERY
Payer: MEDICARE

## 2025-05-21 DIAGNOSIS — G89.29 CHRONIC LOW BACK PAIN WITHOUT SCIATICA, UNSPECIFIED BACK PAIN LATERALITY: Primary | ICD-10-CM

## 2025-05-21 DIAGNOSIS — M54.50 CHRONIC LOW BACK PAIN WITHOUT SCIATICA, UNSPECIFIED BACK PAIN LATERALITY: Primary | ICD-10-CM

## 2025-05-21 PROCEDURE — 97110 THERAPEUTIC EXERCISES: CPT | Performed by: PHYSICAL THERAPIST

## 2025-05-21 PROCEDURE — 97112 NEUROMUSCULAR REEDUCATION: CPT | Performed by: PHYSICAL THERAPIST

## 2025-05-21 NOTE — PROGRESS NOTES
"Daily Note     Today's date: 2025  Patient name: Nikcolas Saeed  : 1950  MRN: [id number]  Referring provider: Elliot Cam DO  Dx:   Encounter Diagnosis     ICD-10-CM    1. Chronic low back pain without sciatica, unspecified back pain laterality  M54.50     G89.29                      Subjective: Patient states some reduction in pain level since performing HEP activities.       Objective: See treatment diary below      Assessment: Patient demonstrated appropriate level of challenge with all activities; no c/o at completion of treatment session.       Plan: Continue per plan of care.      Precautions: Hx of skin CA  POC expires Unit limit Auth Expiration date PT/OT/ST + Visit Limit?   25 BOMN N/A BOMN                           Visit/Unit Tracking  AUTH Status:  Date                Used                Remaining                     Manuals                                                                Neuro Re-Ed             Sup TA activation HEP            Sup TA with marches (wedge) HEP 2x10 ea.           Sup TA with alt UE/LE (wedge)  2x10 ea.            Bridges   20x3\"           RDL  20# KB 2x10                                     Ther Ex             Bike   5 min           L/S ext in stand HEP            TB rows  Black 3x10           TB lat pull downs  Black 3x10           TB Multifidus press  Black 20x3\" ea.           Mini Squats with TA activ.  2x10                                     Ther Activity                                       Gait Training                                       Modalities                                            "

## 2025-05-29 ENCOUNTER — OFFICE VISIT (OUTPATIENT)
Dept: PHYSICAL THERAPY | Facility: CLINIC | Age: 75
End: 2025-05-29
Attending: ORTHOPAEDIC SURGERY
Payer: MEDICARE

## 2025-05-29 DIAGNOSIS — G89.29 CHRONIC LOW BACK PAIN WITHOUT SCIATICA, UNSPECIFIED BACK PAIN LATERALITY: Primary | ICD-10-CM

## 2025-05-29 DIAGNOSIS — M54.50 CHRONIC LOW BACK PAIN WITHOUT SCIATICA, UNSPECIFIED BACK PAIN LATERALITY: Primary | ICD-10-CM

## 2025-05-29 PROCEDURE — 97110 THERAPEUTIC EXERCISES: CPT | Performed by: PHYSICAL THERAPIST

## 2025-05-29 PROCEDURE — 97112 NEUROMUSCULAR REEDUCATION: CPT | Performed by: PHYSICAL THERAPIST

## 2025-05-29 NOTE — PROGRESS NOTES
"Daily Note     Today's date: 2025  Patient name: Nickolas Saeed  : 1950  MRN: [id number]  Referring provider: Elliot Cam DO  Dx:   Encounter Diagnosis     ICD-10-CM    1. Chronic low back pain without sciatica, unspecified back pain laterality  M54.50     G89.29                      Subjective: Patient stated recent increase in pain level of insidious onset; states he has been unable to perform supine march with his right LE secondary to significant pain with activity.       Objective: See treatment diary below      Assessment: Patient unable to perform supine marches secondary to pain in right hip; performed additions as listed without significant c/o pain.       Plan: Continue per plan of care.      Precautions: Hx of skin CA  POC expires Unit limit Auth Expiration date PT/OT/ST + Visit Limit?   25 BOMN N/A BOMN                           Visit/Unit Tracking  AUTH Status:  Date                Used                Remaining                     Manuals                                                               Neuro Re-Ed             Sup TA activation HEP            Sup TA with marches (wedge) HEP 2x10 ea. hold          Sup TA with alt UE/LE (wedge)  2x10 ea.  hold          Bridges   20x3\" 30x3\"          RDL  20# KB 2x10 20# KB 3x10          Sup TA with Pball presses (wedge)   20x5\"          Supine clamshell w/TA activ. (wedge)   GTB 30x3\"                                    Ther Ex             Bike   5 min 5 min          L/S ext in stand HEP            TB rows  Black 3x10 Black 3x10          TB lat pull downs  Black 3x10 Black 3x10          TB Multifidus press  Black 20x3\" ea. Black 20x3\" ea.          Mini Squats with TA activ.  2x10 2x10                                    Ther Activity                                       Gait Training                                       Modalities                                            "

## 2025-06-04 ENCOUNTER — OFFICE VISIT (OUTPATIENT)
Dept: PHYSICAL THERAPY | Facility: CLINIC | Age: 75
End: 2025-06-04
Attending: ORTHOPAEDIC SURGERY
Payer: MEDICARE

## 2025-06-04 DIAGNOSIS — M54.50 CHRONIC LOW BACK PAIN WITHOUT SCIATICA, UNSPECIFIED BACK PAIN LATERALITY: Primary | ICD-10-CM

## 2025-06-04 DIAGNOSIS — G89.29 CHRONIC LOW BACK PAIN WITHOUT SCIATICA, UNSPECIFIED BACK PAIN LATERALITY: Primary | ICD-10-CM

## 2025-06-04 PROCEDURE — 97110 THERAPEUTIC EXERCISES: CPT | Performed by: PHYSICAL THERAPIST

## 2025-06-04 PROCEDURE — 97112 NEUROMUSCULAR REEDUCATION: CPT | Performed by: PHYSICAL THERAPIST

## 2025-06-04 NOTE — PROGRESS NOTES
"Daily Note     Today's date: 2025  Patient name: Nickolas Saeed  : 1950  MRN: [id number]  Referring provider: Elliot Cam DO  Dx:   Encounter Diagnosis     ICD-10-CM    1. Chronic low back pain without sciatica, unspecified back pain laterality  M54.50     G89.29                      Subjective: Patient states significant improvement overall since beginning PT treatment.       Objective: See treatment diary below      Assessment: Patient demonstrated moderate challenge with additions of stand hip ext with TB and resisted sidestepping; no c/o at completion of treatment session.       Plan: Continue per plan of care.      Precautions: Hx of skin CA  POC expires Unit limit Auth Expiration date PT/OT/ST + Visit Limit?   25 BOMN N/A BOMN                           Visit/Unit Tracking  AUTH Status:  Date                Used                Remaining                     Manuals                                                              Neuro Re-Ed             Sup TA activation HEP            Sup TA with marches (wedge) HEP 2x10 ea. hold          Sup TA with alt UE/LE (wedge)  2x10 ea.  hold          Bridges   20x3\" 30x3\" np         RDL  20# KB 2x10 20# KB 3x10 20# KB 3x10         Sup TA with Pball presses (wedge)   20x5\" 20x5\"         Supine clamshell w/TA activ. (wedge)   GTB 30x3\" GTB 30x3\"         Sidestepping w/Multifidus activation    BTB 10x ea.                      Ther Ex             Bike   5 min 5 min 7 min         L/S ext in stand HEP            TB rows  Black 3x10 Black 3x10 [place] 21# 3x10         TB lat pull downs  Black 3x10 Black 3x10 [place] 21# 3x10         TB Multifidus press  Black 20x3\" ea. Black 20x3\" ea. [place] 12# 20x3\" ea.         Mini Squats with TA activ.  2x10 2x10 2x10         Stand hip ext w/TA activ.    GTB 2x10 ea.                      Ther Activity                                       Gait Training                                       Modalities      "

## 2025-06-05 DIAGNOSIS — N40.0 BENIGN PROSTATIC HYPERPLASIA WITHOUT LOWER URINARY TRACT SYMPTOMS: ICD-10-CM

## 2025-06-06 RX ORDER — TAMSULOSIN HYDROCHLORIDE 0.4 MG/1
CAPSULE ORAL
Qty: 90 CAPSULE | Refills: 1 | Status: SHIPPED | OUTPATIENT
Start: 2025-06-06

## 2025-06-11 ENCOUNTER — OFFICE VISIT (OUTPATIENT)
Dept: PHYSICAL THERAPY | Facility: CLINIC | Age: 75
End: 2025-06-11
Attending: ORTHOPAEDIC SURGERY
Payer: MEDICARE

## 2025-06-11 DIAGNOSIS — G89.29 CHRONIC LOW BACK PAIN WITHOUT SCIATICA, UNSPECIFIED BACK PAIN LATERALITY: Primary | ICD-10-CM

## 2025-06-11 DIAGNOSIS — M54.50 CHRONIC LOW BACK PAIN WITHOUT SCIATICA, UNSPECIFIED BACK PAIN LATERALITY: Primary | ICD-10-CM

## 2025-06-11 PROCEDURE — 97112 NEUROMUSCULAR REEDUCATION: CPT | Performed by: PHYSICAL THERAPIST

## 2025-06-11 PROCEDURE — 97110 THERAPEUTIC EXERCISES: CPT | Performed by: PHYSICAL THERAPIST

## 2025-06-11 NOTE — PROGRESS NOTES
"Daily Note     Today's date: 2025  Patient name: Nickolas Saeed  : 1950  MRN: [id number]  Referring provider: Elliot Cam DO  Dx:   Encounter Diagnosis     ICD-10-CM    1. Chronic low back pain without sciatica, unspecified back pain laterality  M54.50     G89.29                      Subjective: Patient states he feels that he is \"almost back to normal\".       Objective: See treatment diary below      Assessment: Patient performed addition of leg press without c/o pain; patient requested to decrease weight with KB for RDL this visit; no c/o at completion of treatment session.       Plan: Continue per plan of care.      Precautions: Hx of skin CA  POC expires Unit limit Auth Expiration date PT/OT/ST + Visit Limit?   25 BOMN N/A BOMN                           Visit/Unit Tracking  AUTH Status:  Date                Used                Remaining                     Manuals                                                             Neuro Re-Ed             Sup TA activation HEP            Sup TA with marches (wedge) HEP 2x10 ea. hold          Sup TA with alt UE/LE (wedge)  2x10 ea.  hold          Bridges   20x3\" 30x3\" np         RDL  20# KB 2x10 20# KB 3x10 20# KB 3x10 15# KB 3x10        Sup TA with Pball presses (wedge)   20x5\" 20x5\"         Supine clamshell w/TA activ. (wedge)   GTB 30x3\" GTB 30x3\" BTB 20x3\"         Sidestepping w/Multifidus activation    BTB 10x ea. [place] 10# 10x ea.                     Ther Ex             Bike   5 min 5 min 7 min 7 min        L/S ext in stand HEP            TB rows  Black 3x10 Black 3x10 [place] 21# 3x10 [place] 21# 3x10        TB lat pull downs  Black 3x10 Black 3x10 [place] 21# 3x10 [place] 21# 3x10        TB Multifidus press  Black 20x3\" ea. Black 20x3\" ea. [place] 12# 20x3\" ea. [place] 12# 20x3\" ea.        Mini Squats with TA activ.  2x10 2x10 2x10 np        Stand hip ext w/TA activ.    GTB 2x10 ea.         Leg press     145# 10x 165# 10x " 205# 10x        Ther Activity                                       Gait Training                                       Modalities

## 2025-06-18 ENCOUNTER — OFFICE VISIT (OUTPATIENT)
Dept: PHYSICAL THERAPY | Facility: CLINIC | Age: 75
End: 2025-06-18
Attending: ORTHOPAEDIC SURGERY
Payer: MEDICARE

## 2025-06-18 DIAGNOSIS — G89.29 CHRONIC LOW BACK PAIN WITHOUT SCIATICA, UNSPECIFIED BACK PAIN LATERALITY: Primary | ICD-10-CM

## 2025-06-18 DIAGNOSIS — M54.50 CHRONIC LOW BACK PAIN WITHOUT SCIATICA, UNSPECIFIED BACK PAIN LATERALITY: Primary | ICD-10-CM

## 2025-06-18 PROCEDURE — 97530 THERAPEUTIC ACTIVITIES: CPT | Performed by: PHYSICAL THERAPIST

## 2025-06-18 NOTE — PROGRESS NOTES
PT Evaluation     Today's date: 2025  Patient name: Nickolas Saeed  : 1950  MRN: [id number]  Referring provider: Elliot Cam DO  Dx:   Encounter Diagnosis     ICD-10-CM    1. Chronic low back pain without sciatica, unspecified back pain laterality  M54.50     G89.29                      Assessment    Assessment details: Patient has attended a total of six PT treatment sessions since initial evaluation on 25.  Patient demonstrates decreased pain, improved L/S ROM, improved strength, and improved functional ability since beginning PT treatment.  Patient has met all goals for PT treatment at the current time.  Patient states he feels that he is able to continue exercises independently at home at this time.  Patient will be discharged from PT treatment at this time with recommendation to continue with HEP activities.      Goals  Short term goals - to be achieved in 4 weeks:    Decrease pain 20-50% - met   Increase strength by 1/2 grade - met   Improve L/S ROM by 25% - met  Long term goals - to be achieved by discharge:    Lifting is improved to maximal level of function - met   Performance in related household activities is improved to maximal level of function - met   IADL performance in related activities is improved to maximal level of function - met   Ambulation ability is improved to maximal level of function - met      Plan    Planned therapy interventions: home exercise program    Plan details: D/C to HEP at this time        Subjective Evaluation    History of Present Illness  Mechanism of injury: Patient states he feels 100% improved since beginning PT treatment.  Patient states he is currently able to perform all functional activities without significant pain or difficulty.  Patient states he is currently able to perform all ADL's and IADL's without pain or difficulty.   Pain  Current pain ratin  At best pain ratin  At worst pain ratin          Objective     General  "Comments:      Lumbar Comments  Active Range of Motion:   Lumbar:  Flexion:  Restriction level: WNL  Extension:  Restriction level: moderate  Left lateral flexion:  Restriction level: moderate  Right lateral flexion:  Restriction level: moderate     Strength/Myotome Testing      Left Hip   Planes of Motion   Flexion: 5     Right Hip   Planes of Motion   Flexion: 4+     Left Knee   Flexion: 5  Extension: 5     Right Knee   Flexion: 5  Extension: 5     Left Ankle/Foot   Dorsiflexion: 5  Plantar flexion: 5     Right Ankle/Foot   Dorsiflexion: 5  Plantar flexion: 5             Precautions: Hx of skin CA  POC expires Unit limit Auth Expiration date PT/OT/ST + Visit Limit?   6/27/25 BOMN N/A BOMN                                           Visit/Unit Tracking  AUTH Status:  Date                             Used                             Remaining                                  Manuals 5/13 5/21 5/29 6/4 6/11 6/18                                   Re-eval            KK                                                           Neuro Re-Ed                       Sup TA activation HEP                     Sup TA with marches (wedge) HEP 2x10 ea. hold                 Sup TA with alt UE/LE (wedge)   2x10 ea.  hold                 Bridges    20x3\" 30x3\" np               RDL   20# KB 2x10 20# KB 3x10 20# KB 3x10 15# KB 3x10             Sup TA with Pball presses (wedge)     20x5\" 20x5\"               Supine clamshell w/TA activ. (wedge)     GTB 30x3\" GTB 30x3\" BTB 20x3\"              Sidestepping w/Multifidus activation       BTB 10x ea. [place] 10# 10x ea.                                     Ther Ex                       Bike    5 min 5 min 7 min 7 min             L/S ext in stand HEP                     TB rows   Black 3x10 Black 3x10 [place] 21# 3x10 [place] 21# 3x10             TB lat pull downs   Black 3x10 Black 3x10 [place] 21# 3x10 [place] 21# 3x10             TB Multifidus press   Black 20x3\" ea. Black 20x3\" ea. [place] 12# " "20x3\" ea. [place] 12# 20x3\" ea.             Mini Squats with TA activ.   2x10 2x10 2x10 np             Stand hip ext w/TA activ.       GTB 2x10 ea.               Leg press         145# 10x 165# 10x 205# 10x             Ther Activity                                                                       Gait Training                                                                       Modalities                                                                          "

## (undated) DEVICE — EYE PACK CUSTOM -FINNEGAN

## (undated) DEVICE — B-H IRRIGATING CAN 19GA FLAT ANGLED 8MM: Brand: OPHTHALMIC CANNULA

## (undated) DEVICE — INTREPID® TRANSFORMER IA HP: Brand: INTREPID®

## (undated) DEVICE — CYSTOSCOPY PACK: Brand: CONVERTORS

## (undated) DEVICE — POUCH UR CATCHER STERILE

## (undated) DEVICE — STANDARD SURGICAL GOWN, L: Brand: CONVERTORS

## (undated) DEVICE — EYE PADS 1 5/8"X2 5/8": Brand: MCKESSON

## (undated) DEVICE — BAG URINE DRAINAGE 4000ML CONTINUOUS IRR

## (undated) DEVICE — POV-IOD SOLUTION 4OZ BT

## (undated) DEVICE — STORZ LOOP ELECTRODE 24 FR

## (undated) DEVICE — GLOVE SRG BIOGEL 7.5

## (undated) DEVICE — GROUNDING PAD UNIVERSAL SLW

## (undated) DEVICE — LUBRICANT SURGILUBE TUBE 4 OZ  FLIP TOP

## (undated) DEVICE — RADIOLOGY STERILE LABELS: Brand: CENTURION

## (undated) DEVICE — THE MONARCH® "D" CARTRIDGE IS A SINGLE-USE POLYPROPYLENE CARTRIDGE FOR POSTERIOR CHAMBER IOL DELIVERY: Brand: MONARCH® III

## (undated) DEVICE — EVACUATOR BLADDER ELLIK DISP STRL

## (undated) DEVICE — Device

## (undated) DEVICE — SYRINGE 20ML LL

## (undated) DEVICE — RESECTOSCOPE LOOE ELECTRODE 27040F/6

## (undated) DEVICE — CLEARCUT® SLIT KNIFE INTREPID MICRO-COAXIAL SYSTEM 2.4 SB: Brand: CLEARCUT®; INTREPID

## (undated) DEVICE — CYSTO TUBING TUR Y IRRIGATION

## (undated) DEVICE — ACTIVE FMS W/ INTREPID* ULTRA SLEEVES, 0.9MM 45° ABS* INTREPID* BALANCED TIP: Brand: ALCON

## (undated) DEVICE — STRAP FOLEY CATH LEG 1545 9

## (undated) DEVICE — AIR INJECT CANNULA 27GA: Brand: OPHTHALMIC CANNULA

## (undated) DEVICE — MICROSURGICAL INSTRUMENT IRR. CYSTITOME 25GA STRAIGHT-REVERSE CUTTING: Brand: ALCON